# Patient Record
Sex: FEMALE | Race: OTHER | HISPANIC OR LATINO | Employment: FULL TIME | URBAN - METROPOLITAN AREA
[De-identification: names, ages, dates, MRNs, and addresses within clinical notes are randomized per-mention and may not be internally consistent; named-entity substitution may affect disease eponyms.]

---

## 2022-06-13 ENCOUNTER — HOSPITAL ENCOUNTER (OUTPATIENT)
Dept: RADIOLOGY | Facility: MEDICAL CENTER | Age: 42
End: 2022-06-13
Attending: FAMILY MEDICINE

## 2022-06-13 DIAGNOSIS — M79.18 GLUTEAL PAIN: ICD-10-CM

## 2022-11-01 ENCOUNTER — APPOINTMENT (OUTPATIENT)
Dept: RADIOLOGY | Facility: MEDICAL CENTER | Age: 42
End: 2022-11-01
Attending: EMERGENCY MEDICINE

## 2022-11-01 ENCOUNTER — HOSPITAL ENCOUNTER (OUTPATIENT)
Facility: MEDICAL CENTER | Age: 42
End: 2022-11-02
Attending: EMERGENCY MEDICINE | Admitting: INTERNAL MEDICINE
Payer: COMMERCIAL

## 2022-11-01 ENCOUNTER — APPOINTMENT (OUTPATIENT)
Dept: RADIOLOGY | Facility: MEDICAL CENTER | Age: 42
End: 2022-11-01
Attending: INTERNAL MEDICINE

## 2022-11-01 DIAGNOSIS — M79.89 SOFT TISSUE MASS: ICD-10-CM

## 2022-11-01 DIAGNOSIS — M79.18 GLUTEAL PAIN: ICD-10-CM

## 2022-11-01 PROBLEM — R22.41 MASS OF RIGHT THIGH: Status: ACTIVE | Noted: 2022-11-01

## 2022-11-01 LAB
ALBUMIN SERPL BCP-MCNC: 4.9 G/DL (ref 3.2–4.9)
ALBUMIN/GLOB SERPL: 1.5 G/DL
ALP SERPL-CCNC: 81 U/L (ref 30–99)
ALT SERPL-CCNC: 23 U/L (ref 2–50)
ANION GAP SERPL CALC-SCNC: 12 MMOL/L (ref 7–16)
AST SERPL-CCNC: 25 U/L (ref 12–45)
BASOPHILS # BLD AUTO: 0.8 % (ref 0–1.8)
BASOPHILS # BLD: 0.05 K/UL (ref 0–0.12)
BILIRUB SERPL-MCNC: 0.2 MG/DL (ref 0.1–1.5)
BUN SERPL-MCNC: 13 MG/DL (ref 8–22)
CALCIUM SERPL-MCNC: 9.2 MG/DL (ref 8.5–10.5)
CHLORIDE SERPL-SCNC: 110 MMOL/L (ref 96–112)
CO2 SERPL-SCNC: 21 MMOL/L (ref 20–33)
CREAT SERPL-MCNC: 0.58 MG/DL (ref 0.5–1.4)
EOSINOPHIL # BLD AUTO: 0.26 K/UL (ref 0–0.51)
EOSINOPHIL NFR BLD: 4.1 % (ref 0–6.9)
ERYTHROCYTE [DISTWIDTH] IN BLOOD BY AUTOMATED COUNT: 42.6 FL (ref 35.9–50)
GFR SERPLBLD CREATININE-BSD FMLA CKD-EPI: 115 ML/MIN/1.73 M 2
GLOBULIN SER CALC-MCNC: 3.2 G/DL (ref 1.9–3.5)
GLUCOSE SERPL-MCNC: 99 MG/DL (ref 65–99)
HCG SERPL QL: NEGATIVE
HCT VFR BLD AUTO: 43.8 % (ref 37–47)
HGB BLD-MCNC: 13.8 G/DL (ref 12–16)
IMM GRANULOCYTES # BLD AUTO: 0.03 K/UL (ref 0–0.11)
IMM GRANULOCYTES NFR BLD AUTO: 0.5 % (ref 0–0.9)
LACTATE SERPL-SCNC: 1.3 MMOL/L (ref 0.5–2)
LYMPHOCYTES # BLD AUTO: 1.9 K/UL (ref 1–4.8)
LYMPHOCYTES NFR BLD: 30 % (ref 22–41)
MCH RBC QN AUTO: 26.4 PG (ref 27–33)
MCHC RBC AUTO-ENTMCNC: 31.5 G/DL (ref 33.6–35)
MCV RBC AUTO: 83.7 FL (ref 81.4–97.8)
MONOCYTES # BLD AUTO: 0.41 K/UL (ref 0–0.85)
MONOCYTES NFR BLD AUTO: 6.5 % (ref 0–13.4)
NEUTROPHILS # BLD AUTO: 3.69 K/UL (ref 2–7.15)
NEUTROPHILS NFR BLD: 58.1 % (ref 44–72)
NRBC # BLD AUTO: 0 K/UL
NRBC BLD-RTO: 0 /100 WBC
PLATELET # BLD AUTO: 186 K/UL (ref 164–446)
PMV BLD AUTO: 10.1 FL (ref 9–12.9)
POTASSIUM SERPL-SCNC: 3.8 MMOL/L (ref 3.6–5.5)
PROT SERPL-MCNC: 8.1 G/DL (ref 6–8.2)
RBC # BLD AUTO: 5.23 M/UL (ref 4.2–5.4)
SODIUM SERPL-SCNC: 143 MMOL/L (ref 135–145)
WBC # BLD AUTO: 6.3 K/UL (ref 4.8–10.8)

## 2022-11-01 PROCEDURE — G0378 HOSPITAL OBSERVATION PER HR: HCPCS

## 2022-11-01 PROCEDURE — 700117 HCHG RX CONTRAST REV CODE 255: Performed by: INTERNAL MEDICINE

## 2022-11-01 PROCEDURE — 84703 CHORIONIC GONADOTROPIN ASSAY: CPT

## 2022-11-01 PROCEDURE — 72193 CT PELVIS W/DYE: CPT

## 2022-11-01 PROCEDURE — 80053 COMPREHEN METABOLIC PANEL: CPT

## 2022-11-01 PROCEDURE — 85025 COMPLETE CBC W/AUTO DIFF WBC: CPT

## 2022-11-01 PROCEDURE — A9270 NON-COVERED ITEM OR SERVICE: HCPCS | Performed by: INTERNAL MEDICINE

## 2022-11-01 PROCEDURE — 83605 ASSAY OF LACTIC ACID: CPT

## 2022-11-01 PROCEDURE — 73720 MRI LWR EXTREMITY W/O&W/DYE: CPT | Mod: RT

## 2022-11-01 PROCEDURE — A9576 INJ PROHANCE MULTIPACK: HCPCS | Performed by: INTERNAL MEDICINE

## 2022-11-01 PROCEDURE — 36415 COLL VENOUS BLD VENIPUNCTURE: CPT

## 2022-11-01 PROCEDURE — 87040 BLOOD CULTURE FOR BACTERIA: CPT

## 2022-11-01 PROCEDURE — 700102 HCHG RX REV CODE 250 W/ 637 OVERRIDE(OP): Performed by: INTERNAL MEDICINE

## 2022-11-01 PROCEDURE — 700117 HCHG RX CONTRAST REV CODE 255: Performed by: EMERGENCY MEDICINE

## 2022-11-01 PROCEDURE — 99285 EMERGENCY DEPT VISIT HI MDM: CPT

## 2022-11-01 PROCEDURE — 99220 PR INITIAL OBSERVATION CARE,LEVL III: CPT | Performed by: INTERNAL MEDICINE

## 2022-11-01 RX ORDER — POLYETHYLENE GLYCOL 3350 17 G/17G
1 POWDER, FOR SOLUTION ORAL
Status: DISCONTINUED | OUTPATIENT
Start: 2022-11-01 | End: 2022-11-02 | Stop reason: HOSPADM

## 2022-11-01 RX ORDER — BISACODYL 10 MG
10 SUPPOSITORY, RECTAL RECTAL
Status: DISCONTINUED | OUTPATIENT
Start: 2022-11-01 | End: 2022-11-02 | Stop reason: HOSPADM

## 2022-11-01 RX ORDER — HYDROMORPHONE HYDROCHLORIDE 1 MG/ML
0.5 INJECTION, SOLUTION INTRAMUSCULAR; INTRAVENOUS; SUBCUTANEOUS
Status: DISCONTINUED | OUTPATIENT
Start: 2022-11-01 | End: 2022-11-02 | Stop reason: HOSPADM

## 2022-11-01 RX ORDER — AMOXICILLIN 250 MG
2 CAPSULE ORAL 2 TIMES DAILY
Status: DISCONTINUED | OUTPATIENT
Start: 2022-11-01 | End: 2022-11-02 | Stop reason: HOSPADM

## 2022-11-01 RX ORDER — OXYCODONE HYDROCHLORIDE 5 MG/1
5 TABLET ORAL
Status: DISCONTINUED | OUTPATIENT
Start: 2022-11-01 | End: 2022-11-02 | Stop reason: HOSPADM

## 2022-11-01 RX ORDER — OXYCODONE HYDROCHLORIDE 10 MG/1
10 TABLET ORAL
Status: DISCONTINUED | OUTPATIENT
Start: 2022-11-01 | End: 2022-11-02 | Stop reason: HOSPADM

## 2022-11-01 RX ORDER — ENOXAPARIN SODIUM 100 MG/ML
40 INJECTION SUBCUTANEOUS DAILY
Status: DISCONTINUED | OUTPATIENT
Start: 2022-11-02 | End: 2022-11-02 | Stop reason: HOSPADM

## 2022-11-01 RX ORDER — ACETAMINOPHEN 325 MG/1
650 TABLET ORAL EVERY 6 HOURS PRN
Status: DISCONTINUED | OUTPATIENT
Start: 2022-11-01 | End: 2022-11-02 | Stop reason: HOSPADM

## 2022-11-01 RX ORDER — ETONOGESTREL 68 MG/1
1 IMPLANT SUBCUTANEOUS ONCE
COMMUNITY

## 2022-11-01 RX ADMIN — GADOTERIDOL 14 ML: 279.3 INJECTION, SOLUTION INTRAVENOUS at 17:52

## 2022-11-01 RX ADMIN — SENNOSIDES AND DOCUSATE SODIUM 2 TABLET: 50; 8.6 TABLET ORAL at 18:22

## 2022-11-01 RX ADMIN — IOHEXOL 85 ML: 350 INJECTION, SOLUTION INTRAVENOUS at 13:32

## 2022-11-01 RX ADMIN — OXYCODONE 5 MG: 5 TABLET ORAL at 18:22

## 2022-11-01 ASSESSMENT — ENCOUNTER SYMPTOMS
NAUSEA: 0
COUGH: 0
DIZZINESS: 0
SEIZURES: 0
PALPITATIONS: 0
LOSS OF CONSCIOUSNESS: 0
INSOMNIA: 0
MYALGIAS: 1
DIARRHEA: 0
EYE REDNESS: 0
WEAKNESS: 0
CONSTIPATION: 0
HEADACHES: 0
SHORTNESS OF BREATH: 0
ABDOMINAL PAIN: 0
FEVER: 0
HEMOPTYSIS: 0
EYE PAIN: 0
CHILLS: 0
WHEEZING: 0
FOCAL WEAKNESS: 0
FALLS: 0
BLOOD IN STOOL: 0
TREMORS: 0
VOMITING: 0
NERVOUS/ANXIOUS: 0

## 2022-11-01 ASSESSMENT — LIFESTYLE VARIABLES
TOTAL SCORE: 0
ON A TYPICAL DAY WHEN YOU DRINK ALCOHOL HOW MANY DRINKS DO YOU HAVE: 0
ALCOHOL_USE: NO
CONSUMPTION TOTAL: NEGATIVE
TOTAL SCORE: 0
HAVE PEOPLE ANNOYED YOU BY CRITICIZING YOUR DRINKING: NO
HOW MANY TIMES IN THE PAST YEAR HAVE YOU HAD 5 OR MORE DRINKS IN A DAY: 0
EVER FELT BAD OR GUILTY ABOUT YOUR DRINKING: NO
TOTAL SCORE: 0
AVERAGE NUMBER OF DAYS PER WEEK YOU HAVE A DRINK CONTAINING ALCOHOL: 0
EVER HAD A DRINK FIRST THING IN THE MORNING TO STEADY YOUR NERVES TO GET RID OF A HANGOVER: NO
HAVE YOU EVER FELT YOU SHOULD CUT DOWN ON YOUR DRINKING: NO

## 2022-11-01 ASSESSMENT — FIBROSIS 4 INDEX: FIB4 SCORE: 1.21

## 2022-11-01 ASSESSMENT — PAIN SCALES - WONG BAKER: WONGBAKER_NUMERICALRESPONSE: DOESN'T HURT AT ALL

## 2022-11-01 ASSESSMENT — PATIENT HEALTH QUESTIONNAIRE - PHQ9
1. LITTLE INTEREST OR PLEASURE IN DOING THINGS: NOT AT ALL
SUM OF ALL RESPONSES TO PHQ9 QUESTIONS 1 AND 2: 0
2. FEELING DOWN, DEPRESSED, IRRITABLE, OR HOPELESS: NOT AT ALL

## 2022-11-01 NOTE — ED PROVIDER NOTES
ED Provider Note    CHIEF COMPLAINT  Chief Complaint   Patient presents with    Abscess     Pt reports small swelling on Right Buttocks beginning last .  Seen at [facility] in  with work up and US preformed.  Pt has US results with herm pt reports no treatment offered.  Pt now has large swelling noted on right buttocks and increased pain.         HPI  Madison Allen is a 42 y.o. female who presents with swelling to her right buttock since .  She was evaluated as an outpatient and had an ultrasound done.  She was told that it might be an infection.  Has not had any procedures for this or antibiotics.  No fevers.  No redness.  She was brought in here due to increasing in size of the swelling.  Patient denies any fevers.  No redness or skin changes.  Has worsening swelling and pain there however to the right buttock.    REVIEW OF SYSTEMS  See HPI for further details. All other systems are negative.     PAST MEDICAL HISTORY   has a past medical history of Pregnant state, incidental.    SOCIAL HISTORY  Social History     Tobacco Use    Smoking status: Former     Years: 0.00     Types: Cigarettes     Quit date: 1994     Years since quittin.1    Smokeless tobacco: Never    Tobacco comments:     last used 9 years ago   Vaping Use    Vaping Use: Never used   Substance and Sexual Activity    Alcohol use: No     Comment: socially    Drug use: No    Sexual activity: Yes     Partners: Male     Birth control/protection: Condom       SURGICAL HISTORY   has a past surgical history that includes cholecystectomy (Dx ).    CURRENT MEDICATIONS  Home Medications       Reviewed by Rosalia Quijano R.N. (Registered Nurse) on 22 at 1106  Med List Status: Partial     Medication Last Dose Status   cephALEXin (KEFLEX) 500 MG CAPS  Active   norethindrone (MICRONOR) 0.35 MG tablet  Active                    ALLERGIES  Allergies   Allergen Reactions    Nkda [No Known Drug Allergy]        PHYSICAL EXAM  VITAL  "SIGNS: /80   Pulse 81   Temp 36.3 °C (97.4 °F) (Temporal)   Resp 12   Ht 1.524 m (5')   Wt 67.2 kg (148 lb 2.4 oz)   SpO2 99%   BMI 28.93 kg/m²   Pulse ox interpretation: I interpret this pulse ox as normal.  Constitutional: Alert in no apparent distress.  HENT: No signs of trauma, Bilateral external ears normal, Nose normal.   Eyes: Conjunctiva normal, Non-icteric.   Neck: Normal range of motion, Supple, No stridor.   Cardiovascular: Regular rate and rhythm.   Thorax & Lungs: Normal breath sounds, No respiratory distress, No wheezing, No chest tenderness.   Abdomen: Bowel sounds normal, Soft, No tenderness, No masses, No pulsatile masses. No peritoneal signs.  Skin: Warm, Dry, No erythema, No rash.  Palpable mass deep to the skin overlying the right buttock.  No fluctuance.  The mass does feel firm to touch though and feels well beneath the dermis.  No erythema or overlying skin changes.  Back: No bony tenderness, No CVA tenderness.   Musculoskeletal: Good range of motion in all major joints. No major deformities noted.   Neurologic: Alert, No focal deficits noted.       DIAGNOSTIC STUDIES / PROCEDURES      LABS  Labs Reviewed   CBC WITH DIFFERENTIAL - Abnormal; Notable for the following components:       Result Value    MCH 26.4 (*)     MCHC 31.5 (*)     All other components within normal limits   COMP METABOLIC PANEL   HCG QUAL SERUM   LACTIC ACID   ESTIMATED GFR   BLOOD CULTURE    Narrative:     Per Hospital Policy: Only change Specimen Src: to \"Line\" if  specified by physician order.   BLOOD CULTURE    Narrative:     Per Hospital Policy: Only change Specimen Src: to \"Line\" if  specified by physician order.         RADIOLOGY  CT-PELVIS WITH   Final Result      Right gluteus denise 15 cm soft tissue mass with mostly fluid and fatty components is highly worrisome for soft tissue sarcoma, liposarcoma. Myxoma should be considered especially given some fat along the margins. MRI thigh with and without " contrast may    prove helpful to further evaluate the soft tissue. The mass is stable to slightly enlarged from June            COURSE & MEDICAL DECISION MAKING    Medications   iohexol (OMNIPAQUE) 350 mg/mL (IV) (85 mL Intravenous Given 11/1/22 1332)       Pertinent Labs & Imaging studies reviewed. (See chart for details)  42 y.o. female's to the right buttock that has been noticed since about June.  Had an outpatient ultrasound performed though no procedure or medications administered.  She was brought in by a friend due to worsening pain symptoms and swelling.  It is affecting her gait now as well.    CT was performed for further characterization of the wound.  Clinically, it does not appear to be an abscess as the swelling appears very deep to the skin within the muscle.  No skin changes, erythema, fluctuance, warmth.  No signs of sepsis.  No fever or tachycardia.    CT pelvis was performed showing a 15 cm soft tissue mass worrisome for soft tissue sarcoma/liposarcoma.  Radiology is recommending MRI thigh with and without contrast for further characterization.    Spoke with oncology, Dr. Garcia.  Recommending hospitalization to expedite work-up.  Will follow-up on MRI results with further recommendations.  Spoke with the hospitalist who is agreeable to the hospitalization.    Patient was informed of the results and my concern for soft tissue cancer.  Sister was present at bedside.  Questions were answered to the best of my ability at this time however will require further testing to determine prognosis and a clear plan of care from here.    /61   Pulse 79   Temp 36.3 °C (97.4 °F) (Temporal)   Resp 16   Ht 1.524 m (5')   Wt 67.2 kg (148 lb 2.4 oz)   SpO2 99%   BMI 28.93 kg/m²       FINAL IMPRESSION  1. Soft tissue mass            Electronically signed by: Dennys Dinero M.D., 11/1/2022 11:42 AM

## 2022-11-01 NOTE — ASSESSMENT & PLAN NOTE
MRI ordered.  Dr. Garcia, Oncology consulted.  Admit to observation and pain control.  PT ordered  NPO asfter midnight in case Dr. Garcia decides to proceed with biopsy

## 2022-11-01 NOTE — H&P
"Hospital Medicine History & Physical Note    Date of Service  11/1/2022    Primary Care Physician  Pcp Pt States None    Consultants  Dr. Garcia, Oncology    Code Status  Prior    Chief Complaint  Chief Complaint   Patient presents with    Abscess     Pt reports small swelling on Right Buttocks beginning last June.  Seen at [facility] in June with work up and US preformed.  Pt has US results with herm pt reports no treatment offered.  Pt now has large swelling noted on right buttocks and increased pain.         History of Presenting Illness  Madison Allen is a 42 y.o. female who presented 11/1/2022 with \"Abscess (Pt reports small swelling on Right Buttocks beginning last June.  Seen at [facility] in June with work up and US preformed.  Pt has US results with herm pt reports no treatment offered.  Pt now has large swelling noted on right buttocks and increased pain. \" )  She presents with R buttock swelling. She denied weight loss, sweats, fever, chills, discharge or, bites or wounds. She had swelling in June and had an ultrasound showing possible abscess/phlegmon but was lost to follow up. The mass remains but has grown becoming more painful and she had difficulty walking thus her family urged her to come to the hospital. She denies smoking. She has no history of cancer. She is not exposed to chemicals. Her father has lung cancer secondary to chemical exposure.  At the ED, she is afebrile and normotensive.  CT Ab/P  Right gluteus denise 15 cm soft tissue mass with mostly fluid and fatty components is highly worrisome for soft tissue sarcoma, liposarcoma. Myxoma should be considered especially given some fat along the margins. MRI thigh with and without contrast may prove helpful to further evaluate the soft tissue. The mass is stable to slightly enlarged from June  No leukocytosis.  Dr. Dinero, EDP called Dr. Garcia who recommended admission for MRI, results of which will determine further work-up. Spoke with " Case Management who recommended admission to observation.  When I saw her at ED, no acute distress. There is no wound or ulcer but there is a mildly tender palpable mass R buttocks.  I called and spoke with Dr. Garcia who recommended admission for MRI and will decide if biopsy needed. He prefers biopsy to be done in the hospital and will authorize it as she is easily lost to follow up if done outpatient.     I discussed the plan of care with patient, family, bedside RN, and oncology.    Review of Systems  Review of Systems   Constitutional:  Negative for chills and fever.   HENT:  Negative for congestion, hearing loss and nosebleeds.    Eyes:  Negative for pain and redness.   Respiratory:  Negative for cough, hemoptysis, shortness of breath and wheezing.    Cardiovascular:  Negative for chest pain and palpitations.   Gastrointestinal:  Negative for abdominal pain, blood in stool, constipation, diarrhea, nausea and vomiting.   Genitourinary:  Negative for dysuria, frequency and hematuria.   Musculoskeletal:  Positive for myalgias. Negative for falls and joint pain.   Skin:  Negative for rash.   Neurological:  Negative for dizziness, tremors, focal weakness, seizures, loss of consciousness, weakness and headaches.   Psychiatric/Behavioral:  The patient is not nervous/anxious and does not have insomnia.    All other systems reviewed and are negative.    Past Medical History   has a past medical history of Pregnant state, incidental.    Surgical History   has a past surgical history that includes cholecystectomy (Dx 2006).     Family History  family history includes Alcohol/Drug in her father; Diabetes in her brother and mother; Heart Disease (age of onset: 62) in her mother; Hypertension in her mother; Thyroid in her sister.   Family history reviewed with patient. There is no family history that is pertinent to the chief complaint.     Social History   reports that she quit smoking about 28 years ago. Her smoking use  included cigarettes. She has never used smokeless tobacco. She reports that she does not drink alcohol and does not use drugs.    Allergies  Allergies   Allergen Reactions    Nkda [No Known Drug Allergy]        Medications  Prior to Admission Medications   Prescriptions Last Dose Informant Patient Reported? Taking?   cephALEXin (KEFLEX) 500 MG CAPS   No No   Sig: Take 1 Cap by mouth 4 times a day.   norethindrone (MICRONOR) 0.35 MG tablet   No No   Sig: Take 1 Tab by mouth every day.      Facility-Administered Medications: None       Physical Exam  Temp:  [36.3 °C (97.4 °F)] 36.3 °C (97.4 °F)  Pulse:  [79-81] 79  Resp:  [12-16] 16  BP: (106-121)/(61-80) 106/61  SpO2:  [99 %] 99 %  Blood Pressure: 106/61   Temperature: 36.3 °C (97.4 °F)   Pulse: 79   Respiration: 16   Pulse Oximetry: 99 %       Physical Exam  Vitals and nursing note reviewed.   Constitutional:       General: She is not in acute distress.  HENT:      Head: Normocephalic and atraumatic.      Right Ear: External ear normal.      Left Ear: External ear normal.      Nose: Nose normal.      Mouth/Throat:      Mouth: Mucous membranes are moist.   Eyes:      General: No scleral icterus.     Conjunctiva/sclera: Conjunctivae normal.   Cardiovascular:      Rate and Rhythm: Normal rate and regular rhythm.      Pulses: Normal pulses.      Heart sounds: No murmur heard.    No friction rub. No gallop.   Pulmonary:      Effort: Pulmonary effort is normal. No respiratory distress.      Breath sounds: Normal breath sounds. No stridor. No wheezing, rhonchi or rales.   Chest:      Chest wall: No tenderness.   Abdominal:      General: Abdomen is flat. Bowel sounds are normal. There is no distension.      Palpations: Abdomen is soft.      Tenderness: There is no abdominal tenderness. There is no guarding or rebound.   Musculoskeletal:         General: Swelling, tenderness and deformity present. Normal range of motion.      Cervical back: Normal range of motion and neck  supple. No rigidity.      Comments: R buttock mass   Skin:     General: Skin is warm.      Coloration: Skin is not jaundiced.   Neurological:      General: No focal deficit present.      Mental Status: She is alert and oriented to person, place, and time. Mental status is at baseline.   Psychiatric:         Mood and Affect: Mood normal.         Behavior: Behavior normal.         Thought Content: Thought content normal.         Judgment: Judgment normal.       Laboratory:  Recent Labs     11/01/22  1229   WBC 6.3   RBC 5.23   HEMOGLOBIN 13.8   HEMATOCRIT 43.8   MCV 83.7   MCH 26.4*   MCHC 31.5*   RDW 42.6   PLATELETCT 186   MPV 10.1     Recent Labs     11/01/22  1229   SODIUM 143   POTASSIUM 3.8   CHLORIDE 110   CO2 21   GLUCOSE 99   BUN 13   CREATININE 0.58   CALCIUM 9.2     Recent Labs     11/01/22  1229   ALTSGPT 23   ASTSGOT 25   ALKPHOSPHAT 81   TBILIRUBIN 0.2   GLUCOSE 99         No results for input(s): NTPROBNP in the last 72 hours.      No results for input(s): TROPONINT in the last 72 hours.    Imaging:  CT-PELVIS WITH   Final Result      Right gluteus denise 15 cm soft tissue mass with mostly fluid and fatty components is highly worrisome for soft tissue sarcoma, liposarcoma. Myxoma should be considered especially given some fat along the margins. MRI thigh with and without contrast may    prove helpful to further evaluate the soft tissue. The mass is stable to slightly enlarged from June      MR-FEMUR-WITH & W/O RIGHT    (Results Pending)       Assessment/Plan:  Justification for Admission Status  I anticipate this patient is appropriate for observation status at this time because w/u of mass    * R gluteal pain and mass/swellinh  Assessment & Plan  MRI ordered.  Dr. Garcia, Oncology consulted.  Admit to observation and pain control.  PT ordered  NPO asfter midnight in case Dr. Garcia decides to proceed with biopsy      VTE prophylaxis: enoxaparin ppx hold prior to procedure

## 2022-11-01 NOTE — ED TRIAGE NOTES
Chief Complaint   Patient presents with    Abscess     Pt reports small swelling on Right Buttocks beginning last June.  Seen at [facility] in June with work up and US preformed.  Pt has US results with herm pt reports no treatment offered.  Pt now has large swelling noted on right buttocks and increased pain.       Pt to triage from [facility] with above complaint and daughter as .  Pt declined interrupter.      /80   Pulse 81   Temp 36.3 °C (97.4 °F) (Temporal)   Resp 12   Ht 1.524 m (5')   Wt 67.2 kg (148 lb 2.4 oz)   SpO2 99%   BMI 28.93 kg/m²

## 2022-11-02 ENCOUNTER — APPOINTMENT (OUTPATIENT)
Dept: RADIOLOGY | Facility: MEDICAL CENTER | Age: 42
End: 2022-11-02
Attending: STUDENT IN AN ORGANIZED HEALTH CARE EDUCATION/TRAINING PROGRAM
Payer: MEDICAID

## 2022-11-02 VITALS
DIASTOLIC BLOOD PRESSURE: 58 MMHG | TEMPERATURE: 98 F | OXYGEN SATURATION: 95 % | HEIGHT: 65 IN | HEART RATE: 78 BPM | RESPIRATION RATE: 16 BRPM | WEIGHT: 149.91 LBS | BODY MASS INDEX: 24.98 KG/M2 | SYSTOLIC BLOOD PRESSURE: 100 MMHG

## 2022-11-02 LAB
ANION GAP SERPL CALC-SCNC: 11 MMOL/L (ref 7–16)
BUN SERPL-MCNC: 17 MG/DL (ref 8–22)
CALCIUM SERPL-MCNC: 9.1 MG/DL (ref 8.5–10.5)
CHLORIDE SERPL-SCNC: 105 MMOL/L (ref 96–112)
CO2 SERPL-SCNC: 21 MMOL/L (ref 20–33)
CREAT SERPL-MCNC: 0.64 MG/DL (ref 0.5–1.4)
ERYTHROCYTE [DISTWIDTH] IN BLOOD BY AUTOMATED COUNT: 43 FL (ref 35.9–50)
GFR SERPLBLD CREATININE-BSD FMLA CKD-EPI: 112 ML/MIN/1.73 M 2
GLUCOSE SERPL-MCNC: 96 MG/DL (ref 65–99)
HCT VFR BLD AUTO: 39.3 % (ref 37–47)
HGB BLD-MCNC: 12.5 G/DL (ref 12–16)
MCH RBC QN AUTO: 26.7 PG (ref 27–33)
MCHC RBC AUTO-ENTMCNC: 31.8 G/DL (ref 33.6–35)
MCV RBC AUTO: 84 FL (ref 81.4–97.8)
PLATELET # BLD AUTO: 191 K/UL (ref 164–446)
PMV BLD AUTO: 11.2 FL (ref 9–12.9)
POTASSIUM SERPL-SCNC: 3.9 MMOL/L (ref 3.6–5.5)
RBC # BLD AUTO: 4.68 M/UL (ref 4.2–5.4)
SODIUM SERPL-SCNC: 137 MMOL/L (ref 135–145)
WBC # BLD AUTO: 7.7 K/UL (ref 4.8–10.8)

## 2022-11-02 PROCEDURE — 80048 BASIC METABOLIC PNL TOTAL CA: CPT

## 2022-11-02 PROCEDURE — 74176 CT ABD & PELVIS W/O CONTRAST: CPT

## 2022-11-02 PROCEDURE — A9270 NON-COVERED ITEM OR SERVICE: HCPCS | Performed by: INTERNAL MEDICINE

## 2022-11-02 PROCEDURE — 85027 COMPLETE CBC AUTOMATED: CPT

## 2022-11-02 PROCEDURE — 700102 HCHG RX REV CODE 250 W/ 637 OVERRIDE(OP): Performed by: INTERNAL MEDICINE

## 2022-11-02 PROCEDURE — 97161 PT EVAL LOW COMPLEX 20 MIN: CPT

## 2022-11-02 PROCEDURE — G0378 HOSPITAL OBSERVATION PER HR: HCPCS

## 2022-11-02 PROCEDURE — 99217 PR OBSERVATION CARE DISCHARGE: CPT | Performed by: STUDENT IN AN ORGANIZED HEALTH CARE EDUCATION/TRAINING PROGRAM

## 2022-11-02 RX ADMIN — SENNOSIDES AND DOCUSATE SODIUM 2 TABLET: 50; 8.6 TABLET ORAL at 17:03

## 2022-11-02 ASSESSMENT — GAIT ASSESSMENTS
GAIT LEVEL OF ASSIST: SUPERVISED
DISTANCE (FEET): 300
DEVIATION: ANTALGIC

## 2022-11-02 ASSESSMENT — COGNITIVE AND FUNCTIONAL STATUS - GENERAL
MOBILITY SCORE: 24
SUGGESTED CMS G CODE MODIFIER MOBILITY: CH

## 2022-11-02 NOTE — DISCHARGE SUMMARY
"Discharge Summary    CHIEF COMPLAINT ON ADMISSION  Chief Complaint   Patient presents with    Abscess     Pt reports small swelling on Right Buttocks beginning last June.  Seen at [facility] in June with work up and US preformed.  Pt has US results with herm pt reports no treatment offered.  Pt now has large swelling noted on right buttocks and increased pain.         Reason for Admission  Right gluteus mass    Admission Date  11/1/2022    CODE STATUS  Full Code    HPI & HOSPITAL COURSE  Per Dr. Harmon's H&P dated 11/1/2022:  \"Madison Allen is a 42 y.o. female who presented 11/1/2022 with \"Abscess (Pt reports small swelling on Right Buttocks beginning last June.  Seen at [facility] in June with work up and US preformed.  Pt has US results with herm pt reports no treatment offered.  Pt now has large swelling noted on right buttocks and increased pain. \" )  She presents with R buttock swelling. She denied weight loss, sweats, fever, chills, discharge or, bites or wounds. She had swelling in June and had an ultrasound showing possible abscess/phlegmon but was lost to follow up. The mass remains but has grown becoming more painful and she had difficulty walking thus her family urged her to come to the hospital. She denies smoking. She has no history of cancer. She is not exposed to chemicals. Her father has lung cancer secondary to chemical exposure.  At the ED, she is afebrile and normotensive.  CT Ab/P  Right gluteus denise 15 cm soft tissue mass with mostly fluid and fatty components is highly worrisome for soft tissue sarcoma, liposarcoma. Myxoma should be considered especially given some fat along the margins. MRI thigh with and without contrast may prove helpful to further evaluate the soft tissue. The mass is stable to slightly enlarged from June  No leukocytosis.  Dr. Dinero, EDP called Dr. Garcia who recommended admission for MRI, results of which will determine further work-up. Spoke with Case " "Management who recommended admission to observation.  When I saw her at ED, no acute distress. There is no wound or ulcer but there is a mildly tender palpable mass R buttocks.  I called and spoke with Dr. Garcia who recommended admission for MRI and will decide if biopsy needed. He prefers biopsy to be done in the hospital and will authorize it as she is easily lost to follow up if done outpatient.\"    Hospital course during my care:  Afebrile and stable vitals. Exam at bedside was notable for mild swelling and deformity of right gluteus area with mass like sensation on palpation in right lower quarter without skin changes, redness or induration (Chaperone RN Doreen presented for this exam).     MRI done showed large right inferior buttock/upper posterior thigh mass which involves the right gluteus denise muscle as well as contacts the hamstrings muscles. This lesion measures 16 x 10 x 7.3 cm in size and demonstrates some fatty elements with heterogeneous enhancement.     Findings discussed with Oncology on call Dr. Garcia - recommended a biopsy of this mass (likely a sarcoma). Discussed care plan with Medical director on call Dr. Jewell - recommended following [facility] protocol with a referral to Oncology  who can follow up the patient as outpatient for biopsy and oncology appointment if indicated.      Pan-CT completed before discharged.     Therefore, she is discharged in fair and stable condition to home with close outpatient follow-up.    The patient recovered much more quickly than anticipated on admission.    Discharge Date  11/02/22    FOLLOW UP ITEMS POST DISCHARGE  N/A    DISCHARGE DIAGNOSES  Principal Problem:    R gluteal pain and mass/swellinh POA: Yes  Resolved Problems:    * No resolved hospital problems. *      FOLLOW UP  Future Appointments   Date Time Provider Department Center   11/2/2022  3:30 PM [facility] CT 3 PEDS [facility], Emergency " Dept  [address] [place] [id number]  [phone]  Go in 1 week(s)  As needed, If symptoms worsen      MEDICATIONS ON DISCHARGE     Medication List        CONTINUE taking these medications        Instructions   Nexplanon 68 MG Impl implant  Generic drug: etonogestrel   Inject 1 Each under the skin one time.  Dose: 1 Each              Allergies  Allergies   Allergen Reactions    Nkda [No Known Drug Allergy]        DIET  Orders Placed This Encounter   Procedures    Diet Order Diet: Regular     Standing Status:   Standing     Number of Occurrences:   1     Order Specific Question:   Diet:     Answer:   Regular [1]       ACTIVITY  As tolerated.  Weight bearing as tolerated    CONSULTATIONS  Oncology    PROCEDURES  N/A    LABORATORY  Lab Results   Component Value Date    SODIUM 137 11/02/2022    POTASSIUM 3.9 11/02/2022    CHLORIDE 105 11/02/2022    CO2 21 11/02/2022    GLUCOSE 96 11/02/2022    BUN 17 11/02/2022    CREATININE 0.64 11/02/2022    CREATININE 0.8 09/06/2007        Lab Results   Component Value Date    WBC 7.7 11/02/2022    HEMOGLOBIN 12.5 11/02/2022    HEMATOCRIT 39.3 11/02/2022    PLATELETCT 191 11/02/2022        Total time of the discharge process exceeds 36 minutes.

## 2022-11-02 NOTE — CARE PLAN
Assumed care of patient at shift change.  Patient AAO x 4, pleasant, on RA, no c/o pain nor discomfort.  Patient NPO at this time for possible procedure.  No needs verbalized at this time.  Call bell and belongings within reach.  Will continue to monitor.          The patient is Stable - Low risk of patient condition declining or worsening    Shift Goals  Clinical Goals: MRI results, pain control  Patient Goals: Comfort  Family Goals: N/A    Progress made toward(s) clinical / shift goals:    Problem: Knowledge Deficit - Standard  Goal: Patient and family/care givers will demonstrate understanding of plan of care, disease process/condition, diagnostic tests and medications  Outcome: Progressing     Problem: Pain - Standard  Goal: Alleviation of pain or a reduction in pain to the patient’s comfort goal  Outcome: Progressing       Patient is not progressing towards the following goals:

## 2022-11-02 NOTE — CARE PLAN
Assumed care of patient from ED.  Patient DELMIO x 4, pleasant, on RA, c/o 6/10 pain in Right buttocks...PRN pain medication given per MAR.  Box meal provided.  No needs verbalized at this time.  Call bell and belongings within reach. Sister at bedside.  Will continue to monitor.          4 Eyes Skin Assessment Completed by MICHEAL Logan and XIMENA Thomas-EMT.    Head WDL  Ears WDL  Nose WDL  Mouth WDL  Neck WDL  Breast/Chest WDL  Shoulder Blades WDL  Spine WDL  (R) Arm/Elbow/Hand WDL  (L) Arm/Elbow/Hand WDL  Abdomen WDL  Groin WDL  Scrotum/Coccyx/Buttocks WDL  (R) Leg WDL  (L) Leg WDL  (R) Heel/Foot/Toe WDL  (L) Heel/Foot/Toe WDL      Devices In Places Pulse Ox      Interventions In Place Pillows    Possible Skin Injury No    Pictures Uploaded Into Epic N/A  Wound Consult Placed N/A  RN Wound Prevention Protocol Ordered No              The patient is Stable - Low risk of patient condition declining or worsening    Shift Goals  Clinical Goals: MRI results, onco consult, pain control  Patient Goals: Comfort  Family Goals: Comfort    Progress made toward(s) clinical / shift goals:    Problem: Knowledge Deficit - Standard  Goal: Patient and family/care givers will demonstrate understanding of plan of care, disease process/condition, diagnostic tests and medications  Outcome: Progressing     Problem: Pain - Standard  Goal: Alleviation of pain or a reduction in pain to the patient’s comfort goal  Outcome: Progressing       Patient is not progressing towards the following goals:

## 2022-11-02 NOTE — ED NOTES
Report to CDU RN.     Pt will transport from MRI to CDU.    Family brought to CDU T203.    Pt to be NPO at midnight for possible biopsy tomorrow per Dr. Harmon.

## 2022-11-02 NOTE — CARE PLAN
The patient is Stable - Low risk of patient condition declining or worsening    Shift Goals  Clinical Goals: onco consult and pain control  Patient Goals: rest and pain control  Family Goals: Comfort    Progress made toward(s) clinical / shift goals:  pain managed throughout shift patient resting comfortably    Patient is not progressing towards the following goals: n/a        Problem: Knowledge Deficit - Standard  Goal: Patient and family/care givers will demonstrate understanding of plan of care, disease process/condition, diagnostic tests and medications  Outcome: Progressing     Problem: Pain - Standard  Goal: Alleviation of pain or a reduction in pain to the patient’s comfort goal  Outcome: Progressing

## 2022-11-02 NOTE — THERAPY
Physical Therapy   Initial Evaluation     Patient Name: Madison Allen  Age:  42 y.o., Sex:  female  Medical Record #: [id number]  Today's Date: 11/2/2022     Assessment  Patient is 42 y.o. female admitted with right gluteal swelling pending possible biopsy. Pt reports she has had swelling ongoing since June. Pt able to complete all bed mob, transfers, and gait with no AD and SPV. Antalgic gait noted with pt reporting mild R gluteal pain but tolerable. Patient will not be actively followed for physical therapy services at this time, however may be seen if requested by physician for 1 more visit within 30 days to address any discharge or equipment needs.    Plan    Recommend Physical Therapy for Evaluation only    DC Equipment Recommendations: None  Discharge Recommendations: Anticipate that the patient will have no further physical therapy needs after discharge from the hospital          11/02/22 0910   Prior Living Situation   Prior Services Home-Independent   Housing / Facility 1 Story House   Steps Into Home 0   Steps In Home 0   Equipment Owned None   Lives with - Patient's Self Care Capacity Spouse;Child Less than 18 Years of Age   Comments independent prior   Prior Level of Functional Mobility   Bed Mobility Independent   Transfer Status Independent   Ambulation Independent   Distance Ambulation (Feet)   (community)   Assistive Devices Used None   Stairs Independent   History of Falls   History of Falls No   Cognition    Cognition / Consciousness WDL   Active ROM Lower Body    Active ROM Lower Body  WDL   Strength Lower Body   Lower Body Strength  WDL   Sensation Lower Body   Lower Extremity Sensation   WDL   Balance Assessment   Sitting Balance (Static) Good   Sitting Balance (Dynamic) Good   Standing Balance (Static) Fair +   Standing Balance (Dynamic) Fair +   Weight Shift Sitting Good   Weight Shift Standing Good   Comments no AD   Gait Analysis   Gait Level Of Assist Supervised   Assistive Device None    Distance (Feet) 300   # of Times Distance was Traveled 1   Deviation Antalgic   Weight Bearing Status no restrictions   Comments no LOB   Bed Mobility    Supine to Sit Supervised   Sit to Supine Supervised   Scooting Supervised   Functional Mobility   Sit to Stand Supervised   Bed, Chair, Wheelchair Transfer Supervised   Transfer Method Stand Step   Mobility in room and hallway with no AD   Education Group   Education Provided Role of Physical Therapist   Role of Physical Therapist Patient Response Patient;Acceptance;Demonstration;Action Demonstration   Anticipated Discharge Equipment and Recommendations   DC Equipment Recommendations None   Discharge Recommendations Anticipate that the patient will have no further physical therapy needs after discharge from the hospital

## 2022-11-03 NOTE — PROGRESS NOTES
Patient discharged to home with friend.    AVS, discharge instructions, and education reviewed with patient. All questions answered.    IV removed.    AVS, discharge instructions, and belongings sent with patient.    Patient walked to friend's car. Refused staff escort.

## 2022-11-06 LAB
BACTERIA BLD CULT: NORMAL
BACTERIA BLD CULT: NORMAL
SIGNIFICANT IND 70042: NORMAL
SIGNIFICANT IND 70042: NORMAL
SITE SITE: NORMAL
SITE SITE: NORMAL
SOURCE SOURCE: NORMAL
SOURCE SOURCE: NORMAL

## 2022-11-09 ENCOUNTER — HOSPITAL ENCOUNTER (OUTPATIENT)
Dept: HEMATOLOGY ONCOLOGY | Facility: MEDICAL CENTER | Age: 42
End: 2022-11-09
Attending: NURSE PRACTITIONER
Payer: COMMERCIAL

## 2022-11-09 VITALS
SYSTOLIC BLOOD PRESSURE: 94 MMHG | HEIGHT: 65 IN | HEART RATE: 73 BPM | RESPIRATION RATE: 18 BRPM | TEMPERATURE: 97.6 F | WEIGHT: 146.94 LBS | BODY MASS INDEX: 24.48 KG/M2 | OXYGEN SATURATION: 97 % | DIASTOLIC BLOOD PRESSURE: 72 MMHG

## 2022-11-09 DIAGNOSIS — M79.18 GLUTEAL PAIN: ICD-10-CM

## 2022-11-09 DIAGNOSIS — R22.41 MASS OF RIGHT LOWER EXTREMITY: ICD-10-CM

## 2022-11-09 PROCEDURE — 99203 OFFICE O/P NEW LOW 30 MIN: CPT | Performed by: NURSE PRACTITIONER

## 2022-11-09 PROCEDURE — 99212 OFFICE O/P EST SF 10 MIN: CPT | Performed by: NURSE PRACTITIONER

## 2022-11-09 ASSESSMENT — ENCOUNTER SYMPTOMS
WHEEZING: 0
DIARRHEA: 0
CHILLS: 0
WEIGHT LOSS: 0
HEADACHES: 0
VOMITING: 0
TINGLING: 0
SHORTNESS OF BREATH: 0
DIAPHORESIS: 0
COUGH: 0
INSOMNIA: 1
DIZZINESS: 0
NAUSEA: 0
CONSTIPATION: 0
FEVER: 0
PALPITATIONS: 0

## 2022-11-09 ASSESSMENT — FIBROSIS 4 INDEX: FIB4 SCORE: 1.17

## 2022-11-09 ASSESSMENT — PAIN SCALES - GENERAL: PAINLEVEL: NO PAIN

## 2022-11-09 NOTE — PROGRESS NOTES
"Subjective     Madison Allen is a 42 y.o. female who presents as a New Patient (Gluteal pain )          HPI    Patient referred to me, Intake Oncology Coordinator by hospitalist Dr. Valerio for gluteal mass.  Patient is unaccompanied for today's visit.     Patient stated that in June 2022 she noticed what she described as \"little ball\" in the posterior right lower extremity.  She was seen at the [facility] clinic at that time and had an ultrasound completed on 6/13/2022.  In reviewing the ultrasound report there was a heterogeneous areas of increased echogenicity within the substance of the right gluteus denise muscle, with central areas of abscess formation and phlegmon.  At that time it measured 14 x 5.9 x 14.6 cm in diameter. According to the patient she was told everything was fine and that it was just filled with \"liquid.\"  Patient then noticed that this mass has gotten bigger and became painful to walk.  She was then seen again at the [facility] clinic and this time was told to go to the emergency department.  She presented to the ER on 11/1/2022.  CT pelvis completed at that time showed a right gluteus denise 15 cm soft tissue mass with mostly fluid and fatty components highly worrisome for soft tissue sarcoma, liposarcoma.  Myxoma is also on the differential.  She was admitted to the hospital and recommended further work-up including biopsy.  MRI on 11/1/2022 of the right femur noted large right inferior buttock/upper posterior thigh mass which involves the right gluteus denise muscle as well as contacts the hamstrings muscles.  This measures approximately 16 x 10 x 7.3 cm in size with the same differentials.  CT chest, abdomen and pelvis completed 11/2/2022 showed no evidence of metastatic disease.  There is calcification of the right lower lobe liver which is indeterminate but may represent remote granulomatous disease.  I did personally review all imaging reports discussed above as well as reviewed the MRI " images in detail.  I discussed the results in detail with the patient today.  Patient was admitted overnight in the hospital but clinically she was very stable and therefore was discharged home to continue with outpatient work-up.    Patient clinically states that she feels well.  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  Otherwise the only complaint she has is the pain with pressure or ambulating of the right posterior gluteal mass.  She defines the pain to be 7/10.    Please see past medical and surgical history below.    Patient does have a family history of cancer in her father and brother.  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  Both have passed away from this disease.    Patient is a never smoker.    Allergies   Allergen Reactions    Nkda [No Known Drug Allergy]      Current Outpatient Medications on File Prior to Encounter   Medication Sig Dispense Refill    etonogestrel (NEXPLANON) 68 MG Implant implant Inject 1 Each under the skin one time.       No current facility-administered medications on file prior to encounter.     Past Medical History:   Diagnosis Date    Pregnant state, incidental      Past Surgical History:   Procedure Laterality Date    CHOLECYSTECTOMY  Dx 2006    no complications     Family History   Problem Relation Age of Onset    Heart Disease Mother 62        hearth attack    Diabetes Mother         pills    Hypertension Mother     Cancer Father         Lung    Alcohol/Drug Father         alcoholic    Thyroid Sister     Cancer Brother         Lung    Diabetes Brother         diet     Social History     Socioeconomic History    Marital status: Single     Spouse name: Not on file    Number of children: Not on file    Years of education: Not on file    Highest education level: Not on file   Occupational History    Not on file   Tobacco Use    Smoking status: Never    Smokeless tobacco: Never    Tobacco comments:     last used 9 years  "ago   Vaping Use    Vaping Use: Never used   Substance and Sexual Activity    Alcohol use: No     Comment: socially    Drug use: No    Sexual activity: Yes     Partners: Male     Birth control/protection: Condom   Other Topics Concern    Not on file   Social History Narrative    Not on file     Social Determinants of Health     Financial Resource Strain: Not on file   Food Insecurity: Not on file   Transportation Needs: Not on file   Physical Activity: Not on file   Stress: Not on file   Social Connections: Not on file   Intimate Partner Violence: Not on file   Housing Stability: Not on file         Review of Systems   Constitutional:  Negative for chills, diaphoresis, fever, malaise/fatigue and weight loss.   Respiratory:  Negative for cough, shortness of breath and wheezing.    Cardiovascular:  Negative for chest pain, palpitations and leg swelling.   Gastrointestinal:  Negative for constipation, diarrhea, nausea and vomiting.   Genitourinary:  Negative for dysuria.   Musculoskeletal:         Right posterior extremity mass   Skin:  Negative for itching and rash.   Neurological:  Negative for dizziness, tingling and headaches.   Psychiatric/Behavioral:  The patient has insomnia (works night shift so difficulty at times sleeping).             Objective     BP (!) 94/72   Pulse 73   Temp 36.4 °C (97.6 °F) (Temporal)   Resp 18   Ht 1.651 m (5' 5\")   Wt 66.7 kg (146 lb 15 oz)   SpO2 97%   BMI 24.45 kg/m²      Physical Exam  Vitals reviewed.   Constitutional:       General: She is not in acute distress.     Appearance: Normal appearance. She is well-developed. She is not diaphoretic.   HENT:      Head: Normocephalic and atraumatic.      Mouth/Throat:      Mouth: Mucous membranes are moist.      Pharynx: Oropharynx is clear. No oropharyngeal exudate or posterior oropharyngeal erythema.   Eyes:      General: No scleral icterus.        Right eye: No discharge.         Left eye: No discharge.      Conjunctiva/sclera: " Conjunctivae normal.      Pupils: Pupils are equal, round, and reactive to light.   Neck:      Thyroid: No thyromegaly.   Cardiovascular:      Rate and Rhythm: Normal rate and regular rhythm.      Pulses: Normal pulses.      Heart sounds: Normal heart sounds. No murmur heard.    No friction rub. No gallop.   Pulmonary:      Effort: Pulmonary effort is normal. No respiratory distress.      Breath sounds: Normal breath sounds. No wheezing.   Abdominal:      General: Bowel sounds are normal. There is no distension.      Palpations: Abdomen is soft.      Tenderness: There is no abdominal tenderness.   Musculoskeletal:         General: Swelling (right posterior lower extremity) and tenderness present. Normal range of motion.      Cervical back: Normal range of motion and neck supple.      Comments: Large gluteal mass palpable   Lymphadenopathy:      Head:      Right side of head: No submental, submandibular, tonsillar, preauricular, posterior auricular or occipital adenopathy.      Left side of head: No submental, submandibular, tonsillar, preauricular, posterior auricular or occipital adenopathy.      Cervical: No cervical adenopathy.      Right cervical: No superficial, deep or posterior cervical adenopathy.     Left cervical: No superficial, deep or posterior cervical adenopathy.      Upper Body:      Right upper body: No supraclavicular adenopathy.      Left upper body: No supraclavicular adenopathy.   Skin:     General: Skin is warm and dry.      Coloration: Skin is not pale.      Findings: No erythema or rash.   Neurological:      Mental Status: She is alert and oriented to person, place, and time.   Psychiatric:         Mood and Affect: Mood normal.         Behavior: Behavior normal.          CT-CHEST,ABDOMEN,PELVIS W/O    Result Date: 11/2/2022 11/2/2022 4:18 PM HISTORY/REASON FOR EXAM:  Unknown primary, initial workup; Concern for a sarcoma and oncology is recommending a pan CT TECHNIQUE/EXAM DESCRIPTION: CT  scan of the chest, abdomen and pelvis without contrast was performed. Noncontrast helical scanning of the chest, abdomen and pelvis was obtained from the lung apices through the pubic symphysis. Low dose optimization technique was utilized for this CT exam including automated exposure control and adjustment of the mA and/or kV according to patient size. COMPARISON:  CT pelvis yesterday. FINDINGS: No osteoblastic or lytic metastasis CT Chest: Lungs: No nodules or airspace process. Mediastinum/Nathaly: No significant adenopathy. Pleura: No pleural effusion. Cardiac: Heart normal in size without pericardial effusion. Vascular: Unremarkable. Soft tissues: Unremarkable. Bones: No acute or destructive process. CT Abdomen and Pelvis: Limited by lack of contrast Liver: No noncontrast evidence of mass. There is a coarse right lobe calcification.. Spleen: Unremarkable. Small splenule. No splenomegaly Pancreas: Unremarkable. Gallbladder: The gallbladder has been resected. Biliary: Nondilated. Adrenal glands: Normal. Kidneys: Unremarkable without hydronephrosis. Bowel: No obstruction or acute inflammation. Lymph nodes: No adenopathy. Vasculature: Unremarkable. Peritoneum: Unremarkable without ascites. Musculoskeletal: No acute or destructive process. Pelvis: No adenopathy or free fluid. Normal size uterus and ovaries Normal configuration of the urinary bladder without abnormal distention Right gluteus denise centered mass is without change and characterized fully yesterday     No noncontrast evidence of metastasis Right gluteus denise centered mass is unchanged Cholecystectomy Calcification in the right lobe liver is indeterminate but may represent remote granulomatous disease    CT-PELVIS WITH    Result Date: 11/1/2022 11/1/2022 1:23 PM HISTORY/REASON FOR EXAM:  Soft tissue mass, pelvis, US/xray nondiagnostic; R buttock mass growing in size since June 2022. TECHNIQUE/EXAM DESCRIPTION AND NUMBER OF VIEWS: CT scan of the pelvis  with contrast. Contrast-enhanced helical scanning of the pelvis was obtained from the iliac crests through the pubic symphysis following the bolus administration of 85 mL of Omnipaque 350 nonionic contrast without complication. Low dose optimization technique was utilized for this CT exam including automated exposure control and adjustment of the mA and/or kV according to patient size. COMPARISON:  6/13/2022 ultrasound. FINDINGS: There is a fairly well-circumscribed ovoid mass in the right gluteus denise muscle measuring 15 x 9 x 6 cm. On ultrasound this measured approximately 14 x 6 cm. This is characterized by areas of mostly fluid density but some fatty components are seen as  well. No extension beyond the confines of the muscle is seen. Some fat is seen peripheral to the margins. No pelvic lymphadenopathy is identified The uterus is heterogeneous without enlargement confirmed. There is no cystic adnexal mass. Urinary bladder is partially decompressed Visualized bowel is normal in appearance Bony structures are normal without osteolytic or blastic change.     Right gluteus denise 15 cm soft tissue mass with mostly fluid and fatty components is highly worrisome for soft tissue sarcoma, liposarcoma. Myxoma should be considered especially given some fat along the margins. MRI thigh with and without contrast may  prove helpful to further evaluate the soft tissue. The mass is stable to slightly enlarged from June    MR-FEMUR-WITH & W/O RIGHT    Result Date: 11/2/2022 11/1/2022 5:41 PM HISTORY/REASON FOR EXAM: Right buttock mass. TECHNIQUE/EXAM DESCRIPTION: MRI of the RIGHT femur without and with contrast. The study was performed on a 3VRa 1.5 Devika MRI scanner. T1 sagittal, T1 coronal, T1 axial, T2 fat-suppressed axial and coronal, and fast spin-echo inversion recovery sagittal images were obtained of the femur pre-contrast followed by T1 axial fat suppressed images post intravenous administration of 14 mL  ProHance. COMPARISON: CT scan 11/1/2022 FINDINGS: Osseous structures/Cartilaginous surfaces: There is no evidence of marrow edema or fracture. No evidence of a bone lesion. There is no evidence of abnormal enhancement seen postcontrast administration. Soft tissue structures: There is a large soft tissue mass involving the right buttock inferiorly which extends into the right upper posterior thigh. This appears to involve the gluteus medius denise muscle as well as contacts the hamstrings muscles. This mass measures 16 x 10 x 7.3 cm in size and is characterized by heterogeneous increased T2 signal with mixed intermediate and increased T1 signal with heterogeneous enhancement. No evidence of inguinal adenopathy.     Large right inferior buttock/upper posterior thigh mass which involves the right gluteus denise muscle as well as contacts the hamstrings muscles. This lesion measures 16 x 10 x 7.3 cm in size and demonstrates some fatty elements with heterogeneous enhancement. Less likely process would be that of a liposarcoma to include myxoid variant liposarcoma.        [facility] ULTRASOUND  06/13/22  FINDINGS:  Within the right buttock soft tissues, there is a heterogeneous area of increased echogenicity measuring 14 x 5.9 x 14.6 cm in diameter. Within the deepest portions of the area of increased echogenicity, there is an ovoid anechoic lesion measuring 2.4 x   1.3 x 1.5 cm in diameter. More inferiorly, there is a graphic area of decreased echogenicity with posterior shadowing measuring 4.1 x 2.6 x 3.8 cm in diameter, likely phlegmon. The areas of decreased echogenicity with surrounding increased echogenicity   reside within the gluteus denise muscle and are compatible with focal areas of abscess and phlegmon.     IMPRESSION:     Heterogeneous areas of increased echogenicity within the substance of the right gluteus denise muscle, with central areas of abscess formation and phlegmon, detailed above.          Assessment & Plan       1. Mass of right lower extremity  IR-BIOPSY-GENERIC      2. Gluteal pain  IR-BIOPSY-GENERIC              Patient with a large 16 cm right gluteal mass concerning findings for malignancy.  Discussed with patient the importance of proceeding with biopsy.  I have ordered a stat biopsy at this time and will have her follow-up with me in the clinic to review the results.  I will also reach out to surgeon to alert him of the case.  We discussed surgical intervention and will make determination once biopsy results have been received.  Patient did verbalize understanding's and agreement with the plan.      Please note that this dictation was created using voice recognition software. I have made every reasonable attempt to correct obvious errors, but I expect that there are errors of grammar and possibly content that I did not discover before finalizing the note.

## 2022-11-10 NOTE — ADDENDUM NOTE
Encounter addended by: Mary Mcallister, Med Ass't on: 11/10/2022 9:57 AM   Actions taken: Charge Capture section accepted

## 2022-11-11 ENCOUNTER — HOSPITAL ENCOUNTER (OUTPATIENT)
Dept: RADIOLOGY | Facility: MEDICAL CENTER | Age: 42
End: 2022-11-11
Attending: NURSE PRACTITIONER
Payer: COMMERCIAL

## 2022-11-11 DIAGNOSIS — M79.18 GLUTEAL PAIN: ICD-10-CM

## 2022-11-11 DIAGNOSIS — R22.41 MASS OF RIGHT LOWER EXTREMITY: ICD-10-CM

## 2022-11-11 PROCEDURE — 87205 SMEAR GRAM STAIN: CPT

## 2022-11-11 PROCEDURE — 87015 SPECIMEN INFECT AGNT CONCNTJ: CPT

## 2022-11-11 PROCEDURE — 88307 TISSUE EXAM BY PATHOLOGIST: CPT

## 2022-11-11 PROCEDURE — 88305 TISSUE EXAM BY PATHOLOGIST: CPT

## 2022-11-11 PROCEDURE — 76942 ECHO GUIDE FOR BIOPSY: CPT

## 2022-11-11 PROCEDURE — 87070 CULTURE OTHR SPECIMN AEROBIC: CPT

## 2022-11-12 LAB
GRAM STN SPEC: NORMAL
PATHOLOGY CONSULT NOTE: NORMAL
SIGNIFICANT IND 70042: NORMAL
SITE SITE: NORMAL
SOURCE SOURCE: NORMAL

## 2022-11-12 NOTE — PROGRESS NOTES
OPIR     Right posterior upper thigh/inferior buttock Mass Biopsy done by Dr. Briscoe, with no sedation. Posterior thigh/inferior buttock access site; X 5 cores in Formalin, X1 core in RPMI, and X1 syringe for culture (washed in 6ml of saline by MD) obtained and sent to pathology.  Patient tolerated the procedure well.    All questions and concerns answered prior to being dc'd; pt provided with appropriate education for procedure, pt discharge to home.

## 2022-11-14 LAB
BACTERIA TISS AEROBE CULT: NORMAL
GRAM STN SPEC: NORMAL
SIGNIFICANT IND 70042: NORMAL
SITE SITE: NORMAL
SOURCE SOURCE: NORMAL

## 2022-11-16 ENCOUNTER — HOSPITAL ENCOUNTER (OUTPATIENT)
Dept: HEMATOLOGY ONCOLOGY | Facility: MEDICAL CENTER | Age: 42
End: 2022-11-16
Attending: NURSE PRACTITIONER
Payer: COMMERCIAL

## 2022-11-16 VITALS
SYSTOLIC BLOOD PRESSURE: 106 MMHG | TEMPERATURE: 98.2 F | HEIGHT: 65 IN | RESPIRATION RATE: 16 BRPM | DIASTOLIC BLOOD PRESSURE: 64 MMHG | OXYGEN SATURATION: 97 % | WEIGHT: 144.62 LBS | BODY MASS INDEX: 24.1 KG/M2 | HEART RATE: 86 BPM

## 2022-11-16 DIAGNOSIS — C49.9 MYXOID LIPOSARCOMA (HCC): ICD-10-CM

## 2022-11-16 PROCEDURE — 99213 OFFICE O/P EST LOW 20 MIN: CPT | Performed by: NURSE PRACTITIONER

## 2022-11-16 PROCEDURE — 99212 OFFICE O/P EST SF 10 MIN: CPT | Performed by: NURSE PRACTITIONER

## 2022-11-16 ASSESSMENT — PAIN SCALES - GENERAL: PAINLEVEL: NO PAIN

## 2022-11-16 ASSESSMENT — ENCOUNTER SYMPTOMS
NERVOUS/ANXIOUS: 1
CONSTIPATION: 0
NAUSEA: 0
FEVER: 0
VOMITING: 0
CHILLS: 0
DIARRHEA: 0

## 2022-11-16 ASSESSMENT — FIBROSIS 4 INDEX: FIB4 SCORE: 1.17

## 2022-11-16 NOTE — PROGRESS NOTES
"Subjective     Madison Allen is a 42 y.o. female who presents with Other (Biopsy Results)          HPI    Patient seen today in follow-up for biopsy results.  She presents unaccompanied for today's visit.    Patient stated that in June 2022 she noticed what she described as \"little ball\" in the posterior right lower extremity.  She was seen at the [facility] clinic at that time and had an ultrasound completed on 6/13/2022.  In reviewing the ultrasound report there was a heterogeneous areas of increased echogenicity within the substance of the right gluteus denise muscle, with central areas of abscess formation and phlegmon.  At that time it measured 14 x 5.9 x 14.6 cm in diameter. According to the patient she was told everything was fine and that it was just filled with \"liquid.\"  Patient then noticed that this mass has gotten bigger and became painful to walk.  She was then seen again at the \[facility]\""s clinic and this time was told to go to the emergency department.  She presented to the ER on 11/1/2022.  CT pelvis completed at that time showed a right gluteus denise 15 cm soft tissue mass with mostly fluid and fatty components highly worrisome for soft tissue sarcoma, liposarcoma.  Myxoma is also on the differential.  She was admitted to the hospital and recommended further work-up including biopsy.  MRI on 11/1/2022 of the right femur noted large right inferior buttock/upper posterior thigh mass which involves the right gluteus denise muscle as well as contacts the hamstrings muscles.  This measures approximately 16 x 10 x 7.3 cm in size with the same differentials.  CT chest, abdomen and pelvis completed 11/2/2022 showed no evidence of metastatic disease.  There is calcification of the right lower lobe liver which is indeterminate but may represent remote granulomatous disease.    Patient tolerated the biopsy fairly well.  She stated since the biopsy she has had increased pain.  She now has pain when sitting as " "well as continuing to have pain with ambulating.  She believes that the mass may be getting slightly bigger from what she can tell.  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  Pathologist stated that there was no round cell component identified within the limited sample.      Allergies   Allergen Reactions    Nkda [No Known Drug Allergy]      Current Outpatient Medications on File Prior to Encounter   Medication Sig Dispense Refill    etonogestrel (NEXPLANON) 68 MG Implant implant Inject 1 Each under the skin one time.       No current facility-administered medications on file prior to encounter.           Review of Systems   Constitutional:  Negative for chills and fever.   Gastrointestinal:  Negative for constipation, diarrhea, nausea and vomiting.   Psychiatric/Behavioral:  The patient is nervous/anxious.             Objective     /64 (BP Location: Left arm, Patient Position: Sitting, BP Cuff Size: Adult)   Pulse 86   Temp 36.8 °C (98.2 °F) (Temporal)   Resp 16   Ht 1.651 m (5' 5\")   Wt 65.6 kg (144 lb 10 oz)   SpO2 97%   BMI 24.07 kg/m²      Physical Exam  Vitals reviewed.   Constitutional:       General: She is not in acute distress.     Appearance: Normal appearance. She is not diaphoretic.   HENT:      Head: Normocephalic and atraumatic.   Cardiovascular:      Rate and Rhythm: Normal rate and regular rhythm.      Heart sounds: Normal heart sounds. No murmur heard.    No friction rub. No gallop.   Pulmonary:      Effort: Pulmonary effort is normal. No respiratory distress.      Breath sounds: No wheezing.   Musculoskeletal:         General: Tenderness (right posterior thigh mass - tender) present.   Skin:     General: Skin is warm and dry.   Neurological:      Mental Status: She is alert and oriented to person, place, and time.   Psychiatric:         Mood and Affect: Mood normal.         Behavior: Behavior normal.             FINAL DIAGNOSIS:     A. " Right posterior thigh mass core biopsies:          Small tissue cores demonstrating myxoid liposarcoma.          No round cell component identified in the limited sample.     Comment: The case is also reviewed by Dr. Saleh and Dr. Galo who   concur. The findings were discussed with CHERRI Gregory.   Molecular studies are not reflexively performed in an attempt to   preserve tissue. Block A2 has been cut through. In the event future   molecular studies are indicated, it is recommended to combine blocks A1   and A3 into one to enrich the block.        CT-CHEST,ABDOMEN,PELVIS W/O    Result Date: 11/2/2022 11/2/2022 4:18 PM HISTORY/REASON FOR EXAM:  Unknown primary, initial workup; Concern for a sarcoma and oncology is recommending a pan CT TECHNIQUE/EXAM DESCRIPTION: CT scan of the chest, abdomen and pelvis without contrast was performed. Noncontrast helical scanning of the chest, abdomen and pelvis was obtained from the lung apices through the pubic symphysis. Low dose optimization technique was utilized for this CT exam including automated exposure control and adjustment of the mA and/or kV according to patient size. COMPARISON:  CT pelvis yesterday. FINDINGS: No osteoblastic or lytic metastasis CT Chest: Lungs: No nodules or airspace process. Mediastinum/Nathaly: No significant adenopathy. Pleura: No pleural effusion. Cardiac: Heart normal in size without pericardial effusion. Vascular: Unremarkable. Soft tissues: Unremarkable. Bones: No acute or destructive process. CT Abdomen and Pelvis: Limited by lack of contrast Liver: No noncontrast evidence of mass. There is a coarse right lobe calcification.. Spleen: Unremarkable. Small splenule. No splenomegaly Pancreas: Unremarkable. Gallbladder: The gallbladder has been resected. Biliary: Nondilated. Adrenal glands: Normal. Kidneys: Unremarkable without hydronephrosis. Bowel: No obstruction or acute inflammation. Lymph nodes: No adenopathy. Vasculature: Unremarkable.  Peritoneum: Unremarkable without ascites. Musculoskeletal: No acute or destructive process. Pelvis: No adenopathy or free fluid. Normal size uterus and ovaries Normal configuration of the urinary bladder without abnormal distention Right gluteus denise centered mass is without change and characterized fully yesterday     No noncontrast evidence of metastasis Right gluteus denise centered mass is unchanged Cholecystectomy Calcification in the right lobe liver is indeterminate but may represent remote granulomatous disease    CT-PELVIS WITH    Result Date: 11/1/2022 11/1/2022 1:23 PM HISTORY/REASON FOR EXAM:  Soft tissue mass, pelvis, US/xray nondiagnostic; R buttock mass growing in size since June 2022. TECHNIQUE/EXAM DESCRIPTION AND NUMBER OF VIEWS: CT scan of the pelvis with contrast. Contrast-enhanced helical scanning of the pelvis was obtained from the iliac crests through the pubic symphysis following the bolus administration of 85 mL of Omnipaque 350 nonionic contrast without complication. Low dose optimization technique was utilized for this CT exam including automated exposure control and adjustment of the mA and/or kV according to patient size. COMPARISON:  6/13/2022 ultrasound. FINDINGS: There is a fairly well-circumscribed ovoid mass in the right gluteus denise muscle measuring 15 x 9 x 6 cm. On ultrasound this measured approximately 14 x 6 cm. This is characterized by areas of mostly fluid density but some fatty components are seen as  well. No extension beyond the confines of the muscle is seen. Some fat is seen peripheral to the margins. No pelvic lymphadenopathy is identified The uterus is heterogeneous without enlargement confirmed. There is no cystic adnexal mass. Urinary bladder is partially decompressed Visualized bowel is normal in appearance Bony structures are normal without osteolytic or blastic change.     Right gluteus denise 15 cm soft tissue mass with mostly fluid and fatty components  is highly worrisome for soft tissue sarcoma, liposarcoma. Myxoma should be considered especially given some fat along the margins. MRI thigh with and without contrast may  prove helpful to further evaluate the soft tissue. The mass is stable to slightly enlarged from June    MR-FEMUR-WITH & W/O RIGHT    Result Date: 11/2/2022 11/1/2022 5:41 PM HISTORY/REASON FOR EXAM: Right buttock mass. TECHNIQUE/EXAM DESCRIPTION: MRI of the RIGHT femur without and with contrast. The study was performed on a [employer] 1.5 Devika MRI scanner. T1 sagittal, T1 coronal, T1 axial, T2 fat-suppressed axial and coronal, and fast spin-echo inversion recovery sagittal images were obtained of the femur pre-contrast followed by T1 axial fat suppressed images post intravenous administration of 14 mL ProHance. COMPARISON: CT scan 11/1/2022 FINDINGS: Osseous structures/Cartilaginous surfaces: There is no evidence of marrow edema or fracture. No evidence of a bone lesion. There is no evidence of abnormal enhancement seen postcontrast administration. Soft tissue structures: There is a large soft tissue mass involving the right buttock inferiorly which extends into the right upper posterior thigh. This appears to involve the gluteus medius denise muscle as well as contacts the hamstrings muscles. This mass measures 16 x 10 x 7.3 cm in size and is characterized by heterogeneous increased T2 signal with mixed intermediate and increased T1 signal with heterogeneous enhancement. No evidence of inguinal adenopathy.     Large right inferior buttock/upper posterior thigh mass which involves the right gluteus denise muscle as well as contacts the hamstrings muscles. This lesion measures 16 x 10 x 7.3 cm in size and demonstrates some fatty elements with heterogeneous enhancement. Less likely process would be that of a liposarcoma to include myxoid variant liposarcoma.           Assessment & Plan       1. Myxoid liposarcoma (HCC)  Referral to General  Surgery              Patient with large right inferior buttock/upper posterior thigh mass recently diagnosed positive for myxoid liposarcoma.  I have personally spoken with oncology surgeon, Dr. Rojas who has agreed to see the patient.  Stat referral has been placed to surgeon.  I discussed with patient surgical intervention.  She did verbalize understanding is in agreement with the plan.      Please note that this dictation was created using voice recognition software. I have made every reasonable attempt to correct obvious errors, but I expect that there are errors of grammar and possibly content that I did not discover before finalizing the note.

## 2022-11-17 NOTE — ADDENDUM NOTE
Encounter addended by: Mary Mcallister, Med Ass't on: 11/16/2022 4:54 PM   Actions taken: Charge Capture section accepted

## 2022-11-22 ENCOUNTER — OFFICE VISIT (OUTPATIENT)
Dept: SURGICAL ONCOLOGY | Facility: MEDICAL CENTER | Age: 42
End: 2022-11-22
Payer: COMMERCIAL

## 2022-11-22 VITALS
OXYGEN SATURATION: 95 % | HEART RATE: 75 BPM | BODY MASS INDEX: 24.63 KG/M2 | DIASTOLIC BLOOD PRESSURE: 72 MMHG | TEMPERATURE: 98.4 F | WEIGHT: 148 LBS | SYSTOLIC BLOOD PRESSURE: 112 MMHG

## 2022-11-22 DIAGNOSIS — C49.9 MYXOID LIPOSARCOMA (HCC): ICD-10-CM

## 2022-11-22 PROCEDURE — 99205 OFFICE O/P NEW HI 60 MIN: CPT | Performed by: SURGERY

## 2022-11-22 ASSESSMENT — LIFESTYLE VARIABLES: SUBSTANCE_ABUSE: 0

## 2022-11-22 ASSESSMENT — ENCOUNTER SYMPTOMS
BLURRED VISION: 0
ABDOMINAL PAIN: 0
NECK PAIN: 0
BRUISES/BLEEDS EASILY: 0
SPUTUM PRODUCTION: 0
DOUBLE VISION: 0
EYE DISCHARGE: 0
WEIGHT LOSS: 0
VOMITING: 0
CONSTIPATION: 0
NERVOUS/ANXIOUS: 0
PALPITATIONS: 0
MYALGIAS: 0
COUGH: 0
HEADACHES: 0
SENSORY CHANGE: 0
FOCAL WEAKNESS: 0
DIARRHEA: 0
DIZZINESS: 0
BACK PAIN: 0
PHOTOPHOBIA: 0
CLAUDICATION: 0
EYE PAIN: 0
TINGLING: 0
HALLUCINATIONS: 0
HEMOPTYSIS: 0
SPEECH CHANGE: 0
DEPRESSION: 0
NAUSEA: 0
HEARTBURN: 0
ORTHOPNEA: 0
CHILLS: 0
FEVER: 0
TREMORS: 0

## 2022-11-22 ASSESSMENT — FIBROSIS 4 INDEX: FIB4 SCORE: 1.17

## 2022-11-22 NOTE — H&P
Surgical Oncology History and Physical    Date of Evaluation: 11/20/2022    Reason for Referral: Myxoid liposarcoma of the right posterior thigh    Referred By: CHERRI Gregory    HPI: Patient is a 42-year-old female otherwise healthy who presents for surgical evaluation for newly identified right posterior thigh myxoid liposarcoma.  The patient noted a small mass in her right gluteal region in June 2020.  Over the last few months it has started to increase significantly in size.  She not been symptomatic from it previously but it has started to cause her some discomfort with sitting down, driving, going to the bathroom.  She denies any pain, weakness, decreased sensation in the right lower extremity.  She was initially evaluated in our medical oncology clinic and a biopsy was performed.  The biopsy was consistent with a myxoid liposarcoma with no round cell component identified in the limited sample.    She presents to me today for to discuss the management options.    Summary of work-up to date:   CT pelvis with contrast ([facility]) 11/1/2022:Fairly well-circumscribed ovoid mass in the right gluteus denise muscle measuring 15 x 9 x 6 cm.  Characterized by areas of mostly fluid density but some fatty components are seen as well.  No extension beyond the confines of the muscle is seen.  Some fat is seen peripherally to the margins.  No lymphadenopathy.    MRI femur with and without contrast right ([facility]) 11/2/2022: Large soft tissue mass involving the right buttock inferiorly which extends into the right upper posterior thigh.  Appears to involve the gluteus medius denise muscle as well as contact the hamstrings muscle.  Measures 16 x 10 x 7.3 cm in size and characterized by heterogeneous increased T2 signal with mixed intermediate and increased T1 signal with heterogeneous enhancement.  No evidence of adenopathy.    CT chest abdomen pelvis without contrast ([facility]) 11/2/2022: No noncontrast evidence of  "metastasis.  Right gluteus denise centered mass is unchanged.  \"Prior cholecystectomy.  Calcification the right lobe of the liver is indeterminate but may represent remote granulomatous disease.    IR guided biopsy ([facility]) 2022: Pathology of right posterior thigh mass on core biopsy consistent with myxoid liposarcoma.  There is no round cell component identified in the limited sample.      Patient presents today for surgical evaluation of newly identified myxoid liposarcoma of the right gluteus muscle extending down to the right posterior thigh.  She states that she does feel as if the mass is grown since her biopsy was performed.  She does has discomfort directly over the area when she sits down, drives, goes to the bathroom.  She denies any neuromuscular symptoms of the right lower extremity and appears to have full function of the extremity.  She denies any fevers or chills.  No recent weight loss.  Denies any masses in any other places.    Body mass index is 24.63 kg/m².    ECO    Past Medical History:          Past Medical History:   Diagnosis Date    Pregnant state, incidental        Past Surgical History:        Past Surgical History:   Procedure Laterality Date    CHOLECYSTECTOMY  Dx     no complications       Current Medications:   Home Medications    Medication Sig Taking? Last Dose Authorizing Provider   etonogestrel (NEXPLANON) 68 MG Implant implant Inject 1 Each under the skin one time.   Physician Outpatient            Allergies:         Allergies   Allergen Reactions    Nkda [No Known Drug Allergy]        Family History:          Family History   Problem Relation Age of Onset    Heart Disease Mother 62        hearth attack    Diabetes Mother         pills    Hypertension Mother     Cancer Father         Lung    Alcohol/Drug Father         alcoholic    Thyroid Sister     Cancer Brother         Lung    Diabetes Brother         diet       Social History:          Social History "     Socioeconomic History    Marital status: Single     Spouse name: Not on file    Number of children: Not on file    Years of education: Not on file    Highest education level: Not on file   Occupational History    Not on file   Tobacco Use    Smoking status: Never    Smokeless tobacco: Never    Tobacco comments:     last used 9 years ago   Vaping Use    Vaping Use: Never used   Substance and Sexual Activity    Alcohol use: No     Comment: socially    Drug use: No    Sexual activity: Yes     Partners: Male     Birth control/protection: Condom   Other Topics Concern    Not on file   Social History Narrative    Not on file     Social Determinants of Health     Financial Resource Strain: Not on file   Food Insecurity: Not on file   Transportation Needs: Not on file   Physical Activity: Not on file   Stress: Not on file   Social Connections: Not on file   Intimate Partner Violence: Not on file   Housing Stability: Not on file       Review of Systems:  Review of Systems - History obtained from the patient    Review of Systems   Constitutional:  Negative for chills, fever, malaise/fatigue and weight loss.   HENT:  Negative for congestion, ear discharge, ear pain, hearing loss and nosebleeds.    Eyes:  Negative for blurred vision, double vision, photophobia, pain and discharge.   Respiratory:  Negative for cough, hemoptysis and sputum production.    Cardiovascular:  Negative for chest pain, palpitations, orthopnea and claudication.   Gastrointestinal:  Negative for abdominal pain, constipation, diarrhea, heartburn, nausea and vomiting.   Genitourinary:  Negative for dysuria, frequency, hematuria and urgency.   Musculoskeletal:  Negative for back pain, joint pain, myalgias and neck pain.   Skin:  Negative for itching and rash.   Neurological:  Negative for dizziness, tingling, tremors, sensory change, speech change, focal weakness and headaches.   Endo/Heme/Allergies:  Negative for environmental allergies. Does not  bruise/bleed easily.   Psychiatric/Behavioral:  Negative for depression, hallucinations, substance abuse and suicidal ideas. The patient is not nervous/anxious.      All other ROS negative.      Physical Exam:  /72 (BP Location: Right arm, Patient Position: Sitting, BP Cuff Size: Adult)   Pulse 75   Temp 36.9 °C (98.4 °F) (Temporal)   Wt 67.1 kg (148 lb)   SpO2 95%   BMI 24.63 kg/m²       Physical Exam  Constitutional:       General: She is not in acute distress.     Appearance: Normal appearance. She is not ill-appearing.   HENT:      Head: Normocephalic.   Eyes:      Conjunctiva/sclera: Conjunctivae normal.      Pupils: Pupils are equal, round, and reactive to light.   Cardiovascular:      Rate and Rhythm: Normal rate and regular rhythm.   Pulmonary:      Effort: Pulmonary effort is normal. No respiratory distress.   Abdominal:      General: Abdomen is flat.      Palpations: Abdomen is soft.   Musculoskeletal:         General: Deformity present.      Cervical back: Neck supple.      Comments: Right posterior gluteal region there is a palpable greater than 15 cm mass that extends down the posterior thigh.  It does feel mobile but is firm.  Neurovascularly in the right lower extremity her sensation and muscular function is completely intact 5 out of 5 strength in the foot the knee and thigh.   Neurological:      Mental Status: She is alert.            Imaging:   CT pelvis with contrast ([facility]) 11/1/2022:Fairly well-circumscribed ovoid mass in the right gluteus denise muscle measuring 15 x 9 x 6 cm.  Characterized by areas of mostly fluid density but some fatty components are seen as well.  No extension beyond the confines of the muscle is seen.  Some fat is seen peripherally to the margins.  No lymphadenopathy.    MRI femur with and without contrast right ([facility]) 11/2/2022: Large soft tissue mass involving the right buttock inferiorly which extends into the right upper posterior thigh.  Appears to  "involve the gluteus medius denise muscle as well as contact the hamstrings muscle.  Measures 16 x 10 x 7.3 cm in size and characterized by heterogeneous increased T2 signal with mixed intermediate and increased T1 signal with heterogeneous enhancement.  No evidence of adenopathy.    CT chest abdomen pelvis without contrast ([facility]) 11/2/2022: No noncontrast evidence of metastasis.  Right gluteus denise centered mass is unchanged.  \"Prior cholecystectomy.  Calcification the right lobe of the liver is indeterminate but may represent remote granulomatous disease.    Pathology:   IR guided biopsy ([facility]) 11/12/2022:    right posterior thigh mass on core biopsy consistent with myxoid liposarcoma.  There is no round cell component identified in the limited sample.    Labs:    Latest Reference Range & Units 11/02/22 01:53   WBC 4.8 - 10.8 K/uL 7.7   RBC 4.20 - 5.40 M/uL 4.68   Hemoglobin 12.0 - 16.0 g/dL 12.5   Hematocrit 37.0 - 47.0 % 39.3   MCV 81.4 - 97.8 fL 84.0   MCH 27.0 - 33.0 pg 26.7 (L)   MCHC 33.6 - 35.0 g/dL 31.8 (L)   RDW 35.9 - 50.0 fL 43.0   Platelet Count 164 - 446 K/uL 191   MPV 9.0 - 12.9 fL 11.2   Sodium 135 - 145 mmol/L 137   Potassium 3.6 - 5.5 mmol/L 3.9   Chloride 96 - 112 mmol/L 105   Co2 20 - 33 mmol/L 21   Anion Gap 7.0 - 16.0  11.0   Glucose 65 - 99 mg/dL 96   Bun 8 - 22 mg/dL 17   Creatinine 0.50 - 1.40 mg/dL 0.64   GFR (CKD-EPI) >60 mL/min/1.73 m 2 112   Calcium 8.5 - 10.5 mg/dL 9.1   (L): Data is abnormally low    Assessment and Plan:   Patient is a very pleasant 42-year-old female who presents for surgical evaluation for a newly identified right gluteal myxoid liposarcoma.     Today in clinic we reviewed her current work-up to date including her imaging which I did show the patient in clinic today.  I discussed her biopsy results.  I discussed with the patient and her daughter the diagnosis of sarcoma in general but specifically her myxoid liposarcoma.  I also explained that given the size " of this mass upfront resection will likely be difficult and I do have concern about its proximity to the right femur as well as to the right sciatic nerve.  Currently she does not have any neurovascular symptoms which is encouraging that the mass is not involving nor compressing the sciatic nerve.  I discussed with her and reviewed the NCCN guidelines regarding the management of sarcomas in general and specifically myxoid liposarcoma.  I would like her to meet with our radiation oncologist to discuss neoadjuvant radiation therapy as I think this will make the potential resection easier and allow us to achieve negative margins.  Additionally I did explain that from a surgical standpoint I think the extent of resection will be fairly extensive and it is possible that she may have some chronic weakness of the right lower extremity however if we are able to preserve the sciatic nerve that her overall function should be generally well-preserved.  The size of defect is concerning based on the size of the mass and I do think she will ultimately need flap coverage of this area to cover the wound.  Given this I will refer her to plastic surgery for further discussion of a potential flap however none of this would occur until after she has received her radiation.      Plan:  -Referral placed to radiation oncology, I discussed this case with Dr. Tian who is going to get her scheduled follow-up in clinic  -Would prefer preoperative radiation therapy as I think it will help with achieving negative margins during resection as well as reduce the risk of potential flap failure as she would not need radiation postoperatively  -I also discussed the potential need for adjuvant chemotherapy depending on the final pathology of this mass.  Currently there is no round cell component which would be the strongest indication for chemotherapy however this is based on a small core needle biopsies of the final path did show round cell  component I would recommend adjuvant chemotherapy.  Will coordinate with plastic surgery to evaluate the patient as it is likely that she will need flap coverage at the time of surgical resection.    The patient and family asked several great questions which were answered to  their satisfaction.  They  are in agreement with the care plan as outlined above.      Artie Rojas MD  Surgical Oncology  November 22, 2022, 8:24 AM

## 2022-12-02 ENCOUNTER — HOSPITAL ENCOUNTER (OUTPATIENT)
Dept: RADIATION ONCOLOGY | Facility: MEDICAL CENTER | Age: 42
End: 2022-12-31
Attending: RADIOLOGY

## 2022-12-02 VITALS
WEIGHT: 147.93 LBS | HEART RATE: 71 BPM | DIASTOLIC BLOOD PRESSURE: 79 MMHG | OXYGEN SATURATION: 98 % | SYSTOLIC BLOOD PRESSURE: 122 MMHG | BODY MASS INDEX: 24.62 KG/M2 | TEMPERATURE: 98 F

## 2022-12-02 DIAGNOSIS — C49.9 MYXOID LIPOSARCOMA (HCC): ICD-10-CM

## 2022-12-02 PROCEDURE — 99214 OFFICE O/P EST MOD 30 MIN: CPT | Performed by: RADIOLOGY

## 2022-12-02 PROCEDURE — 99205 OFFICE O/P NEW HI 60 MIN: CPT | Performed by: RADIOLOGY

## 2022-12-02 RX ORDER — TRAMADOL HYDROCHLORIDE 50 MG/1
50 TABLET ORAL EVERY 4 HOURS PRN
Qty: 84 TABLET | Refills: 0 | Status: SHIPPED | OUTPATIENT
Start: 2022-12-02 | End: 2022-12-16

## 2022-12-02 ASSESSMENT — PAIN SCALES - GENERAL: PAINLEVEL: 7=MODERATE-SEVERE PAIN

## 2022-12-02 ASSESSMENT — FIBROSIS 4 INDEX: FIB4 SCORE: 1.17

## 2022-12-02 NOTE — CONSULTS
RADIATION ONCOLOGY CONSULT    Patient name:  Madison Allen    Primary Physician:  Pcp Pt States None MRN: [id number]  CSN: [id number]   Referring physician:  Artie Rojas M.*  : 1980, 42 y.o.     DATE OF SERVICE: 2022    IDENTIFICATION: A 42 y.o. 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 on 2022  Stage prefix: Initial diagnosis  Laterality: Right  Biopsy of metastatic site performed: No    She is here at the kind request of Artie Caldwell M.*      HISTORY OF PRESENT ILLNESS:  Patient is a pleasant 42-year-old female who started noticing over the last few months increasing pain and mass over her right buttocks which does cause her discomfort while sitting down.  She underwent a CT pelvis 2022 with contrast which showed a fairly well-circumscribed ovoid mass in the right gluteus denise muscle measuring 15 x 9 x 6 cm.  No adenopathy.  MRI femur right showed large soft tissue mass involving right buttocks inferiorly extending to the right upper posterior thigh measuring 16 x 10 x 7.3 cm in size.  CT chest and pelvis 2022 showed no distant metastatic disease.  Patient underwent went to IR guided biopsy 2022 with pathology showing right posterior thigh mass on core biopsy consistent with myxoid liposarcoma.  Patient was seen by Dr. Rojas who recommended preoperative radiation prior to surgery.  Patient overall doing well does have some pain taking Tylenol which does not help enough.    PROBLEM LIST:  Patient Active Problem List   Diagnosis    Supervision of other high-risk pregnancy    AMA (advanced maternal age) multigravida 35+    Thrombocytopenia (HCC)    Single umbilical artery- start NST at 32 weeks; ff-up fetal growth    Labor and delivery, indication for care    R gluteal pain and mass/swellinh    Myxoid  liposarcoma (HCC)        PAST SURGICAL HISTORY:  Past Surgical History:   Procedure Laterality Date    CHOLECYSTECTOMY  Dx 2006    no complications       CURRENT MEDICATIONS:  Current Outpatient Medications   Medication Sig Dispense Refill    traMADol (ULTRAM) 50 MG Tab Take 1 Tablet by mouth every four hours as needed for Moderate Pain for up to 14 days. 84 Tablet 0    etonogestrel (NEXPLANON) 68 MG Implant implant Inject 1 Each under the skin one time.       No current facility-administered medications for this encounter.       ALLERGIES:    Nkda [no known drug allergy]    FAMILY HISTORY:    family history includes Alcohol/Drug in her father; Cancer in her brother and father; Diabetes in her brother and mother; Heart Disease (age of onset: 62) in her mother; Hypertension in her mother; Thyroid in her sister.  Patient currently resides alone in [place]. She is a / with [employer]. She is accompanied to this visit by a friend.    SOCIAL HISTORY:     reports that she has never smoked. She has never used smokeless tobacco. She reports that she does not drink alcohol and does not use drugs.    REVIEW OF SYSTEMS:    A complete review of systems taken. Pertinent items in HPI. All others negative.    PHYSICAL EXAM:    PERFORMANCE STATUS:       View : No data to display.                   View : No data to display.              /79   Pulse 71   Temp 36.7 °C (98 °F)   Wt 67.1 kg (147 lb 14.9 oz)   SpO2 98%   BMI 24.62 kg/m²   Physical Exam  Vitals reviewed.   HENT:      Head: Normocephalic.   Eyes:      Pupils: Pupils are equal, round, and reactive to light.   Cardiovascular:      Rate and Rhythm: Normal rate.   Pulmonary:      Effort: Pulmonary effort is normal.   Abdominal:      General: Abdomen is flat.   Musculoskeletal:         General: Normal range of motion.        Legs:    Neurological:      General: No focal deficit present.      Mental Status: She is alert.   Psychiatric:         Mood  and Affect: Mood normal.        LABORATORY DATA:   Lab Results   Component Value Date/Time    WBC 7.7 11/02/2022 01:53 AM    RBC 4.68 11/02/2022 01:53 AM    HEMOGLOBIN 12.5 11/02/2022 01:53 AM    HEMATOCRIT 39.3 11/02/2022 01:53 AM    MCV 84.0 11/02/2022 01:53 AM    MCH 26.7 (L) 11/02/2022 01:53 AM    MCHC 31.8 (L) 11/02/2022 01:53 AM    RDW 43.0 11/02/2022 01:53 AM    PLATELETCT 191 11/02/2022 01:53 AM    MPV 11.2 11/02/2022 01:53 AM    NEUTSPOLYS 58.10 11/01/2022 12:29 PM    LYMPHOCYTES 30.00 11/01/2022 12:29 PM    MONOCYTES 6.50 11/01/2022 12:29 PM    EOSINOPHILS 4.10 11/01/2022 12:29 PM    BASOPHILS 0.80 11/01/2022 12:29 PM      Lab Results   Component Value Date/Time    SODIUM 137 11/02/2022 01:53 AM    POTASSIUM 3.9 11/02/2022 01:53 AM    CHLORIDE 105 11/02/2022 01:53 AM    CO2 21 11/02/2022 01:53 AM    GLUCOSE 96 11/02/2022 01:53 AM    BUN 17 11/02/2022 01:53 AM    CREATININE 0.64 11/02/2022 01:53 AM    CREATININE 0.8 09/06/2007 10:25 AM           RADIOLOGY DATA:  CT-CHEST,ABDOMEN,PELVIS W/O    Result Date: 11/2/2022  No noncontrast evidence of metastasis Right gluteus denise centered mass is unchanged Cholecystectomy Calcification in the right lobe liver is indeterminate but may represent remote granulomatous disease    CT-PELVIS WITH    Result Date: 11/1/2022  Right gluteus denise 15 cm soft tissue mass with mostly fluid and fatty components is highly worrisome for soft tissue sarcoma, liposarcoma. Myxoma should be considered especially given some fat along the margins. MRI thigh with and without contrast may  prove helpful to further evaluate the soft tissue. The mass is stable to slightly enlarged from June    MR-FEMUR-WITH & W/O RIGHT    Result Date: 11/2/2022  Large right inferior buttock/upper posterior thigh mass which involves the right gluteus denise muscle as well as contacts the hamstrings muscles. This lesion measures 16 x 10 x 7.3 cm in size and demonstrates some fatty elements with  heterogeneous enhancement. Less likely process would be that of a liposarcoma to include myxoid variant liposarcoma.    IR-BIOPSY-GENERIC    Result Date: 11/12/2022  Successful core biopsy and aspiration of right gluteal lesion.      IMPRESSION:    A 42 y.o. 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 on 12/2/2022  Stage prefix: Initial diagnosis  Laterality: Right  Biopsy of metastatic site performed: No      RECOMMENDATIONS:   I discussed the role of preoperative radiation for the treatment of soft tissue sarcoma.  I recommended 50 Gray in 25 fractions followed by resection by Dr. Rojas.  We discussed risk benefits patient is amenable to treatment and will undergo CT mapping on Monday with plan to start treatment the following week.  I have given her prescription of tramadol 50 mg every 6 hours given her significant pain.    Thank you for the opportunity to participate in her care.  If any questions or comments, please do not hesitate in calling.    Orders Placed This Encounter    Rad Onc Treatment Planning CT Simulation    traMADol (ULTRAM) 50 MG Tab

## 2022-12-02 NOTE — PROGRESS NOTES
Patient was seen today in clinic with Dr. Tian for consult.  Vitals signs and weight were obtained and pain assessment was completed.  Allergies and medications were reviewed with the patient.       Vitals/Pain:  Vitals:    12/02/22 0832   BP: 122/79   Pulse: 71   Temp: 36.7 °C (98 °F)   SpO2: 98%   Weight: 67.1 kg (147 lb 14.9 oz)   Pain Score: 7=Moderate-Severe Pain        Allergies:   Nkda [no known drug allergy]    Current Medications:  Current Outpatient Medications   Medication Sig Dispense Refill    etonogestrel (NEXPLANON) 68 MG Implant implant Inject 1 Each under the skin one time.       No current facility-administered medications for this encounter.         PCP:  Pcp Pt States None        Maty Paris R.N.

## 2022-12-02 NOTE — CT SIMULATION
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 on 12/2/2022  Stage prefix: Initial diagnosis  Laterality: Right  Biopsy of metastatic site performed: No       Treatment Planning CT Simulation        Order Questions       Question Answer Comment    Is this for a new course of treatment? Yes     Is this an Addendum? No     Implanted Device/Pacemaker No     Simulation Status Initial     Planned Start Date 12/12/2022     Treatment Site Pelvis     Laterality Right     Treatment Technique IMRT     Treatment Pattern/Frequency Daily 25 tx    Concurrent Chemotherapy No     CT Technique 3D     Slice Thickness 2mm     Scan Extent Pelvis right buttock    Treatment Device(s) Vac Gabriel     Treatment Marker Scar     Patient Attire Gown     Patient Position Prone right buttock, move left leg away if possible    Patient Orientation Head First     Arm Position On Chest     Treatment Image Guidance CBCT      KV/KV     Frequency (KV/KV) Daily     Image Guidance Match Bone     Treatment Planning Image Fusion CT/MR     Other Orders Weekly Physics Check

## 2022-12-05 ENCOUNTER — HOSPITAL ENCOUNTER (OUTPATIENT)
Dept: RADIATION ONCOLOGY | Facility: MEDICAL CENTER | Age: 42
End: 2022-12-31
Attending: RADIOLOGY

## 2022-12-05 PROCEDURE — 77334 RADIATION TREATMENT AID(S): CPT | Mod: 26 | Performed by: RADIOLOGY

## 2022-12-05 PROCEDURE — 77334 RADIATION TREATMENT AID(S): CPT | Performed by: RADIOLOGY

## 2022-12-05 PROCEDURE — 77290 THER RAD SIMULAJ FIELD CPLX: CPT | Performed by: RADIOLOGY

## 2022-12-05 PROCEDURE — 77263 THER RADIOLOGY TX PLNG CPLX: CPT | Performed by: RADIOLOGY

## 2022-12-06 ENCOUNTER — PATIENT OUTREACH (OUTPATIENT)
Dept: ONCOLOGY | Facility: MEDICAL CENTER | Age: 42
End: 2022-12-06
Payer: COMMERCIAL

## 2022-12-06 DIAGNOSIS — Z65.8 PSYCHOSOCIAL DISTRESS: ICD-10-CM

## 2022-12-06 NOTE — PROGRESS NOTES
RTD  CHOCO Santoskelsie stated pt. Wanted to speak with me during lunch and she requested a call back.     Outbound call to patient, patient stated she no longer has [facility] and would like to connect with SANJAY Shoemaker. Patient also stated she connected with OSCHARLENE Guillen about her insurance, I informed pt. That I would would route her this encounter. No further needs at this time.

## 2022-12-06 NOTE — RADIATION COMPLETION NOTES
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 on 12/2/2022  Stage prefix: Initial diagnosis  Laterality: Right  Biopsy of metastatic site performed: No         IMAGING REVIEWED:  [] CT     [x] MRI     [] PET/CT     [] BONE SCAN     [] MAMMO     [] OTHER      TREATMENT INTENT:   [x] CURATIVE     [] MAINTENANCE     []  PALLIATIVE      []  SUPPORTIVE     []  PROPHYLACTIC     [] BENIGN     []  CONSOLIDATIVE      [] DEFINITIVE   []  OLOGIMETASTATIC      LINE OF TREATMENT:  [] ADJUVANT   [] DEFINITIVE   [x] NEOADJUVANT   [] RE-TREATMENT      TECHNIQUE PLANNED:  [x] IMRT   [] 3D   [] SBRT   [] SRS/SRT   [] HDR   [] ELECTRON       IMRT JUSTIFICATION:  []   An immediately adjacent area has been previously irradiated and abutting portals must be established with high precision.    []  Dose escalation is planned to deliver radiation doses in excess of those commonly utilized for similar tumors with conventional treatment.    [x]  The target volume is concave or convex, and the critical normal tissues are within or around that convexity or concavity.    [x]  The target volume is in close proximity to critical structures that must be protected.    []  The volume of interest must be covered with narrow margins to adequately protect  immediately adjacent structures.      FIELDS & BLOCKING:  [x] COMPLEX BLOCKS     []  = 3 TX AREAS     [x]  ARCS     []  CUSTOM SHEILD        []  SIMPLE BLOCK      CHEMOTHERAPY:  []  CONCURRENT     []  INDUCTION     [] SEQUENTIAL     []  <30 DAYS FROM XRT      NOTES:  OAR CONSTRAINTS: (GUIDELINES ONLY NOT ABSOLUTE)   Target Prescribed Coverage   PTV 95% of PTV covered by 100% (cGy) of RX Dose     PTV 99% of PTV covered by 93% (cGy) of RX Dose       TATA Goal   Rectum/Sigmoid V40Gy < 60%   Bladder V45Gy < 35%    R Femoral Head V30Gy < 15%   L Femoral Head V30Gy < 15%   Bowel (small & large) V40Gy < 30%   Individual Small Bowel Loops V15Gy < 120cc   Entire Cavity Small Bowel V45Gy < 195cc   *RTOG 0921, QUANTEC

## 2022-12-06 NOTE — RADIATION COMPLETION NOTES
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 on 12/2/2022  Stage prefix: Initial diagnosis  Laterality: Right  Biopsy of metastatic site performed: No       DATE OF SERVICE: 12/5/2022    TYPE OF SIMULATION: Pelvis    GOAL OF TREATMENT:   [x] Curative  [] Palliative  [] Oligometastatic    CONTRAST:    [] IV Contrast*  [] Small Bowel  [] Rectal  [] Urethral              POSITION:    []  Supine  [x] Prone     COMPLEX:  [x] Complex Blocking   [x]Arcs  [] Custom Blocks  [] >3 Sites    PROCEDURE: Patient positioned on CT table in Vac-Gabriel immobilization device prone. CT acquired thorough the entire volume of interest.  Images reviewed and exported to treatment planning system.    I have personally reviewed the relevant data, performed the target localization, and determined all relevant factors for this patient’s simulation.    *Omnipaque 80 -100cc IVP in conjunction with 500cc NS

## 2022-12-07 ENCOUNTER — PATIENT OUTREACH (OUTPATIENT)
Dept: ONCOLOGY | Facility: MEDICAL CENTER | Age: 42
End: 2022-12-07
Payer: COMMERCIAL

## 2022-12-07 ENCOUNTER — DOCUMENTATION (OUTPATIENT)
Dept: ONCOLOGY | Facility: MEDICAL CENTER | Age: 42
End: 2022-12-07
Payer: COMMERCIAL

## 2022-12-07 NOTE — PROGRESS NOTES
Email received from Radha at Access to [facility] stating:  Madison Camara is no longer active in our program effective 12/6/2022.

## 2022-12-07 NOTE — PROGRESS NOTES
Late Entry for 12/06/2022-  Oncology Social Worker Mindy Chavez and Oncology Nurse Navigator called patient to follow up on referral.  OSW served as an  and spoke Fijian the entire time.  ONN and OSW introduced ourselves and our roles. OSW spoke to the patient about Access to Healthcare and her up and coming radiation treatments.  Patient stated that it would be $700 a week and that she would like to be released from [facility] due to the costs of her treatments and convert to self pay.  Patient stated she was just waiting to talk with CHIO Pereira regarding her options as a self pay.  Patient said she was trying to remain positive but does relate her distress a 6 related to her treatment decisions.  Patient is from [place] she is a single mother of 2 boys Jorge 11 and Edu 8 years old.  She has not told them that she has cancer but they know there is a visible ball on her gluteus backside and that she is getting treatment to get it removed.  She states that she has a niece and a brother in town for her support.  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  She has worked there for 1 year and 7 months and is able to take time from work but it is not paid time.  She is able to drive herself and if she cannot she has her niece or brother to assist her.  She states that she is to have 25 treatments then she will have a rest period then she will have surgery.  OSW is going to provide financial support sent applications for the [employer] [facility] and an application for a gas card.

## 2022-12-07 NOTE — PROGRESS NOTES
"On December 6th, 2022, Oncology Social Worker Mindy Chavez and Oncology Nurse Navigator Serenity Meyer contacted pt. via telephone.  CARLOS Chavez served as  throughout encounter between EVELIO Meyer and pt.   Pt. shared things are going okay.  Pt. shared she is trying to get a hold of [facility] to [facility].  Pt. shared it is going to cost $700 per week and she cannot afford it.  Pt. shared she tried reaching out to [facility] Financial Resource Advocate Nvaid Pereira.  CARLOS Chavez explained she would be able to get pt. connected with FRA once her [facility] membership has been termed.      Pt. shared she is a 6 on DST re: \"it was higher prior to OSCHARLENE Chavez calling.  Confident everything will be okay.\"      Pt. shared she is undocumented.  Pt. shared she is originally from [place].  Pt. shared she has been living in the [place] for the past 22 years.      Pt. shared she lives with her two children, Jorge (11 years old) and Montrell (8 years old).      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 shared she has been working in this factory for the past year and 7 months.  Pt. shared she works the night shift full time from 8 pm to 4:30 am.      Pt. shared she will drive herself to appointments and if she is not able to she has a brother and a niece who can provide transportation.      Pt. will notify CARLOS Chavez via telephone once she has termed her [facility] MDP membership.      CARLOS Chavez informed pt. she will be mailing her [facility] [employer] [place] application for pt. to complete.     "

## 2022-12-12 PROCEDURE — 77301 RADIOTHERAPY DOSE PLAN IMRT: CPT | Mod: 26 | Performed by: RADIOLOGY

## 2022-12-12 PROCEDURE — 77301 RADIOTHERAPY DOSE PLAN IMRT: CPT | Performed by: RADIOLOGY

## 2022-12-12 PROCEDURE — 77300 RADIATION THERAPY DOSE PLAN: CPT | Mod: 26 | Performed by: RADIOLOGY

## 2022-12-12 PROCEDURE — 77300 RADIATION THERAPY DOSE PLAN: CPT | Performed by: RADIOLOGY

## 2022-12-12 PROCEDURE — 77338 DESIGN MLC DEVICE FOR IMRT: CPT | Mod: 26 | Performed by: RADIOLOGY

## 2022-12-12 PROCEDURE — 77338 DESIGN MLC DEVICE FOR IMRT: CPT | Performed by: RADIOLOGY

## 2022-12-13 ENCOUNTER — HOSPITAL ENCOUNTER (OUTPATIENT)
Dept: RADIATION ONCOLOGY | Facility: MEDICAL CENTER | Age: 42
End: 2022-12-13

## 2022-12-13 LAB
CHEMOTHERAPY INFUSION START DATE: NORMAL
CHEMOTHERAPY RECORDS: 2
CHEMOTHERAPY RECORDS: 5000
CHEMOTHERAPY RECORDS: NORMAL
CHEMOTHERAPY RX CANCER: NORMAL
DATE 1ST CHEMO CANCER: NORMAL
RAD ONC ARIA COURSE LAST TREATMENT DATE: NORMAL
RAD ONC ARIA COURSE TREATMENT ELAPSED DAYS: NORMAL
RAD ONC ARIA REFERENCE POINT DOSAGE GIVEN TO DATE: 2
RAD ONC ARIA REFERENCE POINT DOSAGE GIVEN TO DATE: 2.07
RAD ONC ARIA REFERENCE POINT ID: NORMAL
RAD ONC ARIA REFERENCE POINT ID: NORMAL
RAD ONC ARIA REFERENCE POINT SESSION DOSAGE GIVEN: 2
RAD ONC ARIA REFERENCE POINT SESSION DOSAGE GIVEN: 2.07

## 2022-12-13 PROCEDURE — 77280 THER RAD SIMULAJ FIELD SMPL: CPT | Performed by: RADIOLOGY

## 2022-12-13 PROCEDURE — 77386 HCHG IMRT DELIVERY COMPLEX: CPT | Performed by: RADIOLOGY

## 2022-12-14 ENCOUNTER — HOSPITAL ENCOUNTER (OUTPATIENT)
Dept: RADIATION ONCOLOGY | Facility: MEDICAL CENTER | Age: 42
End: 2022-12-31
Attending: RADIOLOGY
Payer: COMMERCIAL

## 2022-12-14 ENCOUNTER — HOSPITAL ENCOUNTER (OUTPATIENT)
Dept: RADIATION ONCOLOGY | Facility: MEDICAL CENTER | Age: 42
End: 2022-12-31
Attending: RADIOLOGY

## 2022-12-14 VITALS
OXYGEN SATURATION: 99 % | TEMPERATURE: 97.6 F | DIASTOLIC BLOOD PRESSURE: 66 MMHG | SYSTOLIC BLOOD PRESSURE: 102 MMHG | HEART RATE: 75 BPM | WEIGHT: 148 LBS | BODY MASS INDEX: 24.63 KG/M2

## 2022-12-14 LAB
CHEMOTHERAPY INFUSION START DATE: NORMAL
CHEMOTHERAPY RECORDS: 2
CHEMOTHERAPY RECORDS: 5000
CHEMOTHERAPY RECORDS: NORMAL
CHEMOTHERAPY RX CANCER: NORMAL
DATE 1ST CHEMO CANCER: NORMAL
RAD ONC ARIA COURSE LAST TREATMENT DATE: NORMAL
RAD ONC ARIA COURSE TREATMENT ELAPSED DAYS: NORMAL
RAD ONC ARIA REFERENCE POINT DOSAGE GIVEN TO DATE: 4
RAD ONC ARIA REFERENCE POINT DOSAGE GIVEN TO DATE: 4.13
RAD ONC ARIA REFERENCE POINT ID: NORMAL
RAD ONC ARIA REFERENCE POINT ID: NORMAL
RAD ONC ARIA REFERENCE POINT SESSION DOSAGE GIVEN: 2
RAD ONC ARIA REFERENCE POINT SESSION DOSAGE GIVEN: 2.07

## 2022-12-14 PROCEDURE — 77386 HCHG IMRT DELIVERY COMPLEX: CPT | Performed by: RADIOLOGY

## 2022-12-14 PROCEDURE — 77014 PR CT GUIDANCE PLACEMENT RAD THERAPY FIELDS: CPT | Mod: 26 | Performed by: RADIOLOGY

## 2022-12-14 ASSESSMENT — PAIN SCALES - GENERAL: PAINLEVEL: NO PAIN

## 2022-12-14 ASSESSMENT — FIBROSIS 4 INDEX: FIB4 SCORE: 1.17

## 2022-12-14 NOTE — ON TREATMENT VISIT
"ON TREATMENT  NOTE  RADIATION ONCOLOGY DEPARTMENT    Patient name:  Madison Allen    Primary Physician:  Pcp Pt States None MRN: [id number]  CSN: [id number]   Referring physician:  Artie Rojas M.*   : 1980, 42 y.o.     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 on 2022  Stage prefix: Initial diagnosis  Laterality: Right  Biopsy of metastatic site performed: No      TREATMENT SUMMARY:  Course First Treatment Date 2022  Course Last Treatment Date 2022  Radiation Treatments       Active   Plans   R Buttock   Most recent treatment: Dose planned: 200 cGy (fraction 2 of 25 on 2022)   Total: Dose planned: 5,000 cGy   Elapsed Days: 1 @            Historical   No historical radiation treatments to show.                 SUBJECTIVE:  Well appearing    VITAL SIGNS:      2022     1:38 PM 2022     8:32 AM 2022     8:37 AM 2022     3:32 PM 2022    10:06 AM 2022     4:49 PM 2022     6:49 AM   Vitals   SYSTOLIC 102 122 112 106 94 100 98   DIASTOLIC 66 79 72 64 72 58 54   Pulse 75 71 75 86 73 78 63   Temperature 36.4 °C (97.6 °F) 36.7 °C (98 °F) 36.9 °C (98.4 °F) 36.8 °C (98.2 °F) 36.4 °C (97.6 °F) 36.7 °C (98 °F) 36.4 °C (97.6 °F)   Respiration    16 18 16 18   Weight 148 147.93 148 144.62 146.94     Height    1.651 m (5' 5\") 1.651 m (5' 5\")     BMI 24.63 kg/m2 24.62 kg/m2 24.63 kg/m2 24.07 kg/m2 24.45 kg/m2     Pulse Oximetry 99 % 98 % 95 % 97 % 97 % 95 % 97 %     KPS: 100, Fully active, able to carry on all pre-disease performed without restriction (ECOG equivalent 0)  Encounter Vitals  Temperature: 36.4 °C (97.6 °F)  Blood Pressure: 102/66  Pulse: 75  Pulse Oximetry: 99 %  Weight: 67.1 kg (148 lb)  Pain Score: No pain      2022     1:38 PM 2022     8:32 AM " 11/16/2022     3:32 PM 11/9/2022    10:06 AM   Pain Assessment   Pain Score NO PAIN 7=MODERATE-S NO PAIN NO PAIN          PHYSICAL EXAM:  Physical Exam         12/14/2022     1:42 PM   Toxicity Assessment   Toxicity Assessment Skin   Fatigue (lethargy, malaise, asthenia) None   Radiation Dermatitis None   Photosensitivity None   Dry Skin Normal   Alopecia Normal   Erythema Multiforme (e.g., Brice-Kal syndrome, toxic epidermal necrolysis) Absent   Nail Changes Normal   Wound (Non-Infectious) None   Wound (Infectious) None   RT - Pain due to RT None   Tumor Pain (onset or exacerbation of tumor pain due to treatment) None   Skin - Late RT No change from baseline       CURRENT MEDICATIONS:    Current Outpatient Medications:     traMADol (ULTRAM) 50 MG Tab, Take 1 Tablet by mouth every four hours as needed for Moderate Pain for up to 14 days., Disp: 84 Tablet, Rfl: 0    etonogestrel (NEXPLANON) 68 MG Implant implant, Inject 1 Each under the skin one time., Disp: , Rfl:     LABORATORY DATA:   Lab Results   Component Value Date/Time    SODIUM 137 11/02/2022 01:53 AM    POTASSIUM 3.9 11/02/2022 01:53 AM    CHLORIDE 105 11/02/2022 01:53 AM    CO2 21 11/02/2022 01:53 AM    GLUCOSE 96 11/02/2022 01:53 AM    BUN 17 11/02/2022 01:53 AM    CREATININE 0.64 11/02/2022 01:53 AM    CREATININE 0.8 09/06/2007 10:25 AM       Lab Results   Component Value Date/Time    WBC 7.7 11/02/2022 01:53 AM    RBC 4.68 11/02/2022 01:53 AM    HEMOGLOBIN 12.5 11/02/2022 01:53 AM    HEMATOCRIT 39.3 11/02/2022 01:53 AM    MCV 84.0 11/02/2022 01:53 AM    MCH 26.7 (L) 11/02/2022 01:53 AM    MCHC 31.8 (L) 11/02/2022 01:53 AM    PLATELETCT 191 11/02/2022 01:53 AM         RADIOLOGY DATA:  CT-CHEST,ABDOMEN,PELVIS W/O    Result Date: 11/2/2022  No noncontrast evidence of metastasis Right gluteus denise centered mass is unchanged Cholecystectomy Calcification in the right lobe liver is indeterminate but may represent remote granulomatous  disease    CT-PELVIS WITH    Result Date: 11/1/2022  Right gluteus denise 15 cm soft tissue mass with mostly fluid and fatty components is highly worrisome for soft tissue sarcoma, liposarcoma. Myxoma should be considered especially given some fat along the margins. MRI thigh with and without contrast may  prove helpful to further evaluate the soft tissue. The mass is stable to slightly enlarged from June    MR-FEMUR-WITH & W/O RIGHT    Result Date: 11/2/2022  Large right inferior buttock/upper posterior thigh mass which involves the right gluteus denise muscle as well as contacts the hamstrings muscles. This lesion measures 16 x 10 x 7.3 cm in size and demonstrates some fatty elements with heterogeneous enhancement. Less likely process would be that of a liposarcoma to include myxoid variant liposarcoma.    IR-BIOPSY-GENERIC    Result Date: 11/12/2022  Successful core biopsy and aspiration of right gluteal lesion.      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 on 12/2/2022      PLAN:  No change in treatment plan    Disposition:  Treatment plan and imaging reviewed. Questions answered. Continue therapy outlined.     Charles Tian M.D.    No orders of the defined types were placed in this encounter.

## 2022-12-14 NOTE — CT SIMULATION
DATE OF SERVICE: 12/13/2022    Radiation Therapy Episodes       Active Episodes       Radiation Therapy: IMRT (12/12/2022)                   Radiation Treatments         Plan Last Treated On Elapsed Days Fractions Treated Prescribed Fraction Dose (cGy) Prescribed Total Dose (cGy)    R Buttock 12/13/2022 0 @ [id number] 1 of 25 200 5,000                  Reference Point Last Treated On Elapsed Days Most Recent Session Dose (cGy) Total Dose (cGy)    PTV 12/13/2022 0 @ [id number] 200 200    R Buttock cp 12/13/2022 0 @ [id number] 207 207                            First Visit Simple Simulation: Called by [employer] machine to verify treatment parameters including:  treatment site, treatment dose, and treatment setup prior to first treatment. Image derived shifts reviewed in all appropriate plains.  Shifts approved.  Patient treated.    I have personally reviewed the relevant data, performed the target localization, and determined all relevant factors for this patient’s simulation.

## 2022-12-15 ENCOUNTER — HOSPITAL ENCOUNTER (OUTPATIENT)
Dept: RADIATION ONCOLOGY | Facility: MEDICAL CENTER | Age: 42
End: 2022-12-15
Payer: COMMERCIAL

## 2022-12-15 LAB
CHEMOTHERAPY INFUSION START DATE: NORMAL
CHEMOTHERAPY RECORDS: 2
CHEMOTHERAPY RECORDS: 5000
CHEMOTHERAPY RECORDS: NORMAL
CHEMOTHERAPY RX CANCER: NORMAL
DATE 1ST CHEMO CANCER: NORMAL
RAD ONC ARIA COURSE LAST TREATMENT DATE: NORMAL
RAD ONC ARIA COURSE TREATMENT ELAPSED DAYS: NORMAL
RAD ONC ARIA REFERENCE POINT DOSAGE GIVEN TO DATE: 6
RAD ONC ARIA REFERENCE POINT DOSAGE GIVEN TO DATE: 6.2
RAD ONC ARIA REFERENCE POINT ID: NORMAL
RAD ONC ARIA REFERENCE POINT ID: NORMAL
RAD ONC ARIA REFERENCE POINT SESSION DOSAGE GIVEN: 2
RAD ONC ARIA REFERENCE POINT SESSION DOSAGE GIVEN: 2.07

## 2022-12-15 PROCEDURE — 77386 HCHG IMRT DELIVERY COMPLEX: CPT | Performed by: RADIOLOGY

## 2022-12-15 PROCEDURE — 77014 PR CT GUIDANCE PLACEMENT RAD THERAPY FIELDS: CPT | Mod: 26 | Performed by: RADIOLOGY

## 2022-12-15 PROCEDURE — 77336 RADIATION PHYSICS CONSULT: CPT | Performed by: RADIOLOGY

## 2022-12-16 ENCOUNTER — HOSPITAL ENCOUNTER (OUTPATIENT)
Dept: RADIATION ONCOLOGY | Facility: MEDICAL CENTER | Age: 42
End: 2022-12-16
Payer: COMMERCIAL

## 2022-12-16 LAB
CHEMOTHERAPY INFUSION START DATE: NORMAL
CHEMOTHERAPY RECORDS: 2
CHEMOTHERAPY RECORDS: 5000
CHEMOTHERAPY RECORDS: NORMAL
CHEMOTHERAPY RX CANCER: NORMAL
DATE 1ST CHEMO CANCER: NORMAL
RAD ONC ARIA COURSE LAST TREATMENT DATE: NORMAL
RAD ONC ARIA COURSE TREATMENT ELAPSED DAYS: NORMAL
RAD ONC ARIA REFERENCE POINT DOSAGE GIVEN TO DATE: 8
RAD ONC ARIA REFERENCE POINT DOSAGE GIVEN TO DATE: 8.26
RAD ONC ARIA REFERENCE POINT ID: NORMAL
RAD ONC ARIA REFERENCE POINT ID: NORMAL
RAD ONC ARIA REFERENCE POINT SESSION DOSAGE GIVEN: 2
RAD ONC ARIA REFERENCE POINT SESSION DOSAGE GIVEN: 2.07

## 2022-12-16 PROCEDURE — 77014 PR CT GUIDANCE PLACEMENT RAD THERAPY FIELDS: CPT | Mod: 26 | Performed by: RADIOLOGY

## 2022-12-16 PROCEDURE — 77386 HCHG IMRT DELIVERY COMPLEX: CPT | Performed by: RADIOLOGY

## 2022-12-19 ENCOUNTER — HOSPITAL ENCOUNTER (OUTPATIENT)
Dept: RADIATION ONCOLOGY | Facility: MEDICAL CENTER | Age: 42
End: 2022-12-19
Payer: COMMERCIAL

## 2022-12-19 LAB
CHEMOTHERAPY INFUSION START DATE: NORMAL
CHEMOTHERAPY RECORDS: 2
CHEMOTHERAPY RECORDS: 5000
CHEMOTHERAPY RECORDS: NORMAL
CHEMOTHERAPY RX CANCER: NORMAL
DATE 1ST CHEMO CANCER: NORMAL
RAD ONC ARIA COURSE LAST TREATMENT DATE: NORMAL
RAD ONC ARIA COURSE TREATMENT ELAPSED DAYS: NORMAL
RAD ONC ARIA REFERENCE POINT DOSAGE GIVEN TO DATE: 10
RAD ONC ARIA REFERENCE POINT DOSAGE GIVEN TO DATE: 10.33
RAD ONC ARIA REFERENCE POINT ID: NORMAL
RAD ONC ARIA REFERENCE POINT ID: NORMAL
RAD ONC ARIA REFERENCE POINT SESSION DOSAGE GIVEN: 2
RAD ONC ARIA REFERENCE POINT SESSION DOSAGE GIVEN: 2.07

## 2022-12-19 PROCEDURE — 77427 RADIATION TX MANAGEMENT X5: CPT | Performed by: RADIOLOGY

## 2022-12-19 PROCEDURE — 77386 HCHG IMRT DELIVERY COMPLEX: CPT | Performed by: RADIOLOGY

## 2022-12-19 PROCEDURE — 77014 PR CT GUIDANCE PLACEMENT RAD THERAPY FIELDS: CPT | Mod: 26 | Performed by: RADIOLOGY

## 2022-12-20 ENCOUNTER — HOSPITAL ENCOUNTER (OUTPATIENT)
Dept: RADIATION ONCOLOGY | Facility: MEDICAL CENTER | Age: 42
End: 2022-12-20
Payer: COMMERCIAL

## 2022-12-20 ENCOUNTER — PATIENT OUTREACH (OUTPATIENT)
Dept: ONCOLOGY | Facility: MEDICAL CENTER | Age: 42
End: 2022-12-20
Payer: COMMERCIAL

## 2022-12-20 LAB
CHEMOTHERAPY INFUSION START DATE: NORMAL
CHEMOTHERAPY RECORDS: 2
CHEMOTHERAPY RECORDS: 5000
CHEMOTHERAPY RECORDS: NORMAL
CHEMOTHERAPY RX CANCER: NORMAL
DATE 1ST CHEMO CANCER: NORMAL
RAD ONC ARIA COURSE LAST TREATMENT DATE: NORMAL
RAD ONC ARIA COURSE TREATMENT ELAPSED DAYS: NORMAL
RAD ONC ARIA REFERENCE POINT DOSAGE GIVEN TO DATE: 12
RAD ONC ARIA REFERENCE POINT DOSAGE GIVEN TO DATE: 12.4
RAD ONC ARIA REFERENCE POINT ID: NORMAL
RAD ONC ARIA REFERENCE POINT ID: NORMAL
RAD ONC ARIA REFERENCE POINT SESSION DOSAGE GIVEN: 2
RAD ONC ARIA REFERENCE POINT SESSION DOSAGE GIVEN: 2.07

## 2022-12-20 PROCEDURE — 77014 PR CT GUIDANCE PLACEMENT RAD THERAPY FIELDS: CPT | Mod: 26 | Performed by: RADIOLOGY

## 2022-12-20 PROCEDURE — 77386 HCHG IMRT DELIVERY COMPLEX: CPT | Performed by: RADIOLOGY

## 2022-12-20 NOTE — PROGRESS NOTES
On December 20, 2022, Oncology Social Worker Mindy Chavez processed and submitted [facility] completed application on pt.'s behalf to [facility] for review.

## 2022-12-20 NOTE — PROGRESS NOTES
On December 20, 2022, Oncology Social Worker Mindy Chavez attempted telephone contact with pt.  OSW Scott left voicemail message for pt. in Chinese requesting pt. call back at earliest convenience.  OSW Scott left contact information in voicemail message.

## 2022-12-20 NOTE — PROGRESS NOTES
On December 20, 2022, Oncology Social Worker Mindy Chavez contacted pt. via telephone. CARLOS Chavez spoke to pt. in Turkmen throughout encounter.  CARLOS Chavez clarified and added information to pt.'s submitted [facility].      CARLOS Chavez informed pt. she had processed her application for transportation [place] and a gas card of $100 would be waiting for pt.  at Radiation Oncology .

## 2022-12-20 NOTE — PROGRESS NOTES
On December 20, 2022, Oncology Social Worker Mindy Chavez processed pt.'s [facility] [employer] [place] application.  Pt. will receive $100 in gas card to assist with transportation.   OSW Scott left envelope for pt. at Radiation Oncology for .

## 2022-12-20 NOTE — PROGRESS NOTES
On December 20, 2022, Oncology Social Worker Mindy Chavez attempted telephone contact with pt.  CARLOS Chavez informed pt. in voicemail message in Khmer she had been approved by [facility] for financial assistance in the amount of $650.  CARLOS Chavez informed pt. a check in the amount of $650 would be waiting for pt. to  today at her Radiation Oncology appointment.

## 2022-12-21 ENCOUNTER — HOSPITAL ENCOUNTER (OUTPATIENT)
Dept: RADIATION ONCOLOGY | Facility: MEDICAL CENTER | Age: 42
End: 2022-12-21
Payer: COMMERCIAL

## 2022-12-21 ENCOUNTER — HOSPITAL ENCOUNTER (OUTPATIENT)
Dept: RADIATION ONCOLOGY | Facility: MEDICAL CENTER | Age: 42
End: 2022-12-31
Attending: RADIOLOGY

## 2022-12-21 LAB
CHEMOTHERAPY INFUSION START DATE: NORMAL
CHEMOTHERAPY RECORDS: 2
CHEMOTHERAPY RECORDS: 5000
CHEMOTHERAPY RECORDS: NORMAL
CHEMOTHERAPY RX CANCER: NORMAL
DATE 1ST CHEMO CANCER: NORMAL
RAD ONC ARIA COURSE LAST TREATMENT DATE: NORMAL
RAD ONC ARIA COURSE TREATMENT ELAPSED DAYS: NORMAL
RAD ONC ARIA REFERENCE POINT DOSAGE GIVEN TO DATE: 14
RAD ONC ARIA REFERENCE POINT DOSAGE GIVEN TO DATE: 14.46
RAD ONC ARIA REFERENCE POINT ID: NORMAL
RAD ONC ARIA REFERENCE POINT ID: NORMAL
RAD ONC ARIA REFERENCE POINT SESSION DOSAGE GIVEN: 2
RAD ONC ARIA REFERENCE POINT SESSION DOSAGE GIVEN: 2.07

## 2022-12-21 PROCEDURE — 77386 HCHG IMRT DELIVERY COMPLEX: CPT | Performed by: RADIOLOGY

## 2022-12-21 PROCEDURE — 77014 PR CT GUIDANCE PLACEMENT RAD THERAPY FIELDS: CPT | Mod: 26 | Performed by: RADIOLOGY

## 2022-12-21 NOTE — ON TREATMENT VISIT
"ON TREATMENT  NOTE  RADIATION ONCOLOGY DEPARTMENT    Patient name:  Madison Allen    Primary Physician:  Pcp Pt States None MRN: [id number]  CSN: [id number]   Referring physician:  Artie Rojas M.*   : 1980, 42 y.o.     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 on 2022  Stage prefix: Initial diagnosis  Laterality: Right  Biopsy of metastatic site performed: No      TREATMENT SUMMARY:  Course First Treatment Date 2022  Course Last Treatment Date 2022  Radiation Treatments       Active   Plans   R Buttock   Most recent treatment: Dose planned: 200 cGy (fraction 7 of 25 on 2022)   Total: Dose planned: 5,000 cGy   Elapsed Days: 8 @            Historical   No historical radiation treatments to show.                 SUBJECTIVE:  Well appearing, mild pain in mass taking tramadol    VITAL SIGNS:      2022   Vitals   SYSTOLIC 108 102 122 112 106 94 100    98    104   DIASTOLIC 86 66 79 72 64 72 58    54    56   Pulse 78 75 71 75 86 73 78    63    69   Temperature 36.6 °C (97.8 °F) 36.4 °C (97.6 °F) 36.7 °C (98 °F) 36.9 °C (98.4 °F) 36.8 °C (98.2 °F) 36.4 °C (97.6 °F) 36.7 °C (98 °F)    36.4 °C (97.6 °F)    36.9 °C (98.4 °F)   Respiration     16 18 16    18    16   Weight  148 147.93 148 144.62 146.94    Height     1.651 m (5' 5\") 1.651 m (5' 5\")    BMI  24.63 kg/m2 24.62 kg/m2 24.63 kg/m2 24.07 kg/m2 24.45 kg/m2    Pulse Oximetry 100 % 99 % 98 % 95 % 97 % 97 % 95 %    97 %    96 %       Multiple values from one day are sorted in reverse-chronological order     KPS: 100, Fully active, able to carry on all pre-disease performed without restriction (ECOG equivalent 0)  Encounter Vitals  Temperature: (P) 36.6 °C (97.8 °F)  Blood Pressure: (P) " 108/86  Pulse: (P) 78  Pulse Oximetry: (P) 100 %      12/14/2022 12/2/2022 11/16/2022 11/9/2022   Pain Assessment   Pain Score NO PAIN 7=MODERATE-S NO PAIN NO PAIN       Multiple values from one day are sorted in reverse-chronological order          PHYSICAL EXAM:  Physical Exam  Musculoskeletal:         General: Tenderness present.   Neurological:      General: No focal deficit present.            12/14/2022   Toxicity Assessment   Toxicity Assessment Skin   Fatigue (lethargy, malaise, asthenia) None   Radiation Dermatitis None   Photosensitivity None   Dry Skin Normal   Alopecia Normal   Erythema Multiforme (e.g., Brice-Kal syndrome, toxic epidermal necrolysis) Absent   Nail Changes Normal   Wound (Non-Infectious) None   Wound (Infectious) None   RT - Pain due to RT None   Tumor Pain (onset or exacerbation of tumor pain due to treatment) None   Skin - Late RT No change from baseline       Multiple values from one day are sorted in reverse-chronological order       CURRENT MEDICATIONS:    Current Outpatient Medications:     etonogestrel (NEXPLANON) 68 MG Implant implant, Inject 1 Each under the skin one time., Disp: , Rfl:     LABORATORY DATA:   Lab Results   Component Value Date/Time    SODIUM 137 11/02/2022 01:53 AM    POTASSIUM 3.9 11/02/2022 01:53 AM    CHLORIDE 105 11/02/2022 01:53 AM    CO2 21 11/02/2022 01:53 AM    GLUCOSE 96 11/02/2022 01:53 AM    BUN 17 11/02/2022 01:53 AM    CREATININE 0.64 11/02/2022 01:53 AM    CREATININE 0.8 09/06/2007 10:25 AM       Lab Results   Component Value Date/Time    WBC 7.7 11/02/2022 01:53 AM    RBC 4.68 11/02/2022 01:53 AM    HEMOGLOBIN 12.5 11/02/2022 01:53 AM    HEMATOCRIT 39.3 11/02/2022 01:53 AM    MCV 84.0 11/02/2022 01:53 AM    MCH 26.7 (L) 11/02/2022 01:53 AM    MCHC 31.8 (L) 11/02/2022 01:53 AM    PLATELETCT 191 11/02/2022 01:53 AM         RADIOLOGY DATA:  CT-CHEST,ABDOMEN,PELVIS W/O    Result Date: 11/2/2022  No noncontrast evidence of metastasis Right  gluteus denise centered mass is unchanged Cholecystectomy Calcification in the right lobe liver is indeterminate but may represent remote granulomatous disease    CT-PELVIS WITH    Result Date: 11/1/2022  Right gluteus denise 15 cm soft tissue mass with mostly fluid and fatty components is highly worrisome for soft tissue sarcoma, liposarcoma. Myxoma should be considered especially given some fat along the margins. MRI thigh with and without contrast may  prove helpful to further evaluate the soft tissue. The mass is stable to slightly enlarged from June    MR-FEMUR-WITH & W/O RIGHT    Result Date: 11/2/2022  Large right inferior buttock/upper posterior thigh mass which involves the right gluteus denise muscle as well as contacts the hamstrings muscles. This lesion measures 16 x 10 x 7.3 cm in size and demonstrates some fatty elements with heterogeneous enhancement. Less likely process would be that of a liposarcoma to include myxoid variant liposarcoma.    IR-BIOPSY-GENERIC    Result Date: 11/12/2022  Successful core biopsy and aspiration of right gluteal lesion.      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 on 12/2/2022      PLAN:  No change in treatment plan    Disposition:  Treatment plan and imaging reviewed. Questions answered. Continue therapy outlined.     Charles Tian M.D.    No orders of the defined types were placed in this encounter.

## 2022-12-22 ENCOUNTER — HOSPITAL ENCOUNTER (OUTPATIENT)
Dept: RADIATION ONCOLOGY | Facility: MEDICAL CENTER | Age: 42
End: 2022-12-22
Payer: COMMERCIAL

## 2022-12-22 LAB
CHEMOTHERAPY INFUSION START DATE: NORMAL
CHEMOTHERAPY RECORDS: 2
CHEMOTHERAPY RECORDS: 5000
CHEMOTHERAPY RECORDS: NORMAL
CHEMOTHERAPY RX CANCER: NORMAL
DATE 1ST CHEMO CANCER: NORMAL
RAD ONC ARIA COURSE LAST TREATMENT DATE: NORMAL
RAD ONC ARIA COURSE TREATMENT ELAPSED DAYS: NORMAL
RAD ONC ARIA REFERENCE POINT DOSAGE GIVEN TO DATE: 16
RAD ONC ARIA REFERENCE POINT DOSAGE GIVEN TO DATE: 16.53
RAD ONC ARIA REFERENCE POINT ID: NORMAL
RAD ONC ARIA REFERENCE POINT ID: NORMAL
RAD ONC ARIA REFERENCE POINT SESSION DOSAGE GIVEN: 2
RAD ONC ARIA REFERENCE POINT SESSION DOSAGE GIVEN: 2.07

## 2022-12-22 PROCEDURE — 77336 RADIATION PHYSICS CONSULT: CPT | Performed by: RADIOLOGY

## 2022-12-22 PROCEDURE — 77014 PR CT GUIDANCE PLACEMENT RAD THERAPY FIELDS: CPT | Mod: 26 | Performed by: RADIOLOGY

## 2022-12-22 PROCEDURE — 77386 HCHG IMRT DELIVERY COMPLEX: CPT | Performed by: RADIOLOGY

## 2022-12-27 ENCOUNTER — HOSPITAL ENCOUNTER (OUTPATIENT)
Dept: RADIATION ONCOLOGY | Facility: MEDICAL CENTER | Age: 42
End: 2022-12-27
Payer: COMMERCIAL

## 2022-12-27 LAB
CHEMOTHERAPY INFUSION START DATE: NORMAL
CHEMOTHERAPY RECORDS: 2
CHEMOTHERAPY RECORDS: 5000
CHEMOTHERAPY RECORDS: NORMAL
CHEMOTHERAPY RX CANCER: NORMAL
DATE 1ST CHEMO CANCER: NORMAL
RAD ONC ARIA COURSE LAST TREATMENT DATE: NORMAL
RAD ONC ARIA COURSE TREATMENT ELAPSED DAYS: NORMAL
RAD ONC ARIA REFERENCE POINT DOSAGE GIVEN TO DATE: 18
RAD ONC ARIA REFERENCE POINT DOSAGE GIVEN TO DATE: 18.59
RAD ONC ARIA REFERENCE POINT ID: NORMAL
RAD ONC ARIA REFERENCE POINT ID: NORMAL
RAD ONC ARIA REFERENCE POINT SESSION DOSAGE GIVEN: 2
RAD ONC ARIA REFERENCE POINT SESSION DOSAGE GIVEN: 2.07

## 2022-12-27 PROCEDURE — 77386 HCHG IMRT DELIVERY COMPLEX: CPT | Performed by: RADIOLOGY

## 2022-12-27 PROCEDURE — 77014 PR CT GUIDANCE PLACEMENT RAD THERAPY FIELDS: CPT | Mod: 26 | Performed by: RADIOLOGY

## 2022-12-28 ENCOUNTER — HOSPITAL ENCOUNTER (OUTPATIENT)
Dept: RADIATION ONCOLOGY | Facility: MEDICAL CENTER | Age: 42
End: 2022-12-31
Attending: RADIOLOGY

## 2022-12-28 ENCOUNTER — HOSPITAL ENCOUNTER (OUTPATIENT)
Dept: RADIATION ONCOLOGY | Facility: MEDICAL CENTER | Age: 42
End: 2022-12-28
Payer: COMMERCIAL

## 2022-12-28 VITALS
HEART RATE: 81 BPM | WEIGHT: 148 LBS | BODY MASS INDEX: 24.63 KG/M2 | SYSTOLIC BLOOD PRESSURE: 116 MMHG | DIASTOLIC BLOOD PRESSURE: 77 MMHG | OXYGEN SATURATION: 97 % | TEMPERATURE: 97.6 F

## 2022-12-28 LAB
CHEMOTHERAPY INFUSION START DATE: NORMAL
CHEMOTHERAPY RECORDS: 2
CHEMOTHERAPY RECORDS: 5000
CHEMOTHERAPY RECORDS: NORMAL
CHEMOTHERAPY RX CANCER: NORMAL
DATE 1ST CHEMO CANCER: NORMAL
RAD ONC ARIA COURSE LAST TREATMENT DATE: NORMAL
RAD ONC ARIA COURSE TREATMENT ELAPSED DAYS: NORMAL
RAD ONC ARIA REFERENCE POINT DOSAGE GIVEN TO DATE: 20
RAD ONC ARIA REFERENCE POINT DOSAGE GIVEN TO DATE: 20.66
RAD ONC ARIA REFERENCE POINT ID: NORMAL
RAD ONC ARIA REFERENCE POINT ID: NORMAL
RAD ONC ARIA REFERENCE POINT SESSION DOSAGE GIVEN: 2
RAD ONC ARIA REFERENCE POINT SESSION DOSAGE GIVEN: 2.07

## 2022-12-28 PROCEDURE — 77014 PR CT GUIDANCE PLACEMENT RAD THERAPY FIELDS: CPT | Mod: 26 | Performed by: RADIOLOGY

## 2022-12-28 PROCEDURE — 77427 RADIATION TX MANAGEMENT X5: CPT | Performed by: RADIOLOGY

## 2022-12-28 PROCEDURE — 77386 HCHG IMRT DELIVERY COMPLEX: CPT | Performed by: RADIOLOGY

## 2022-12-28 ASSESSMENT — FIBROSIS 4 INDEX: FIB4 SCORE: 1.17

## 2022-12-28 ASSESSMENT — PAIN SCALES - GENERAL: PAINLEVEL: NO PAIN

## 2022-12-28 NOTE — ON TREATMENT VISIT
"  ON TREATMENT  NOTE  RADIATION ONCOLOGY DEPARTMENT    Patient name:  Madison Allen    Primary Physician:  Pcp Pt States None MRN: [id number]  CSN: [id number]   Referring physician:  Artie Rojas M.*   : 1980, 42 y.o.     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 on 2022  Stage prefix: Initial diagnosis  Laterality: Right  Biopsy of metastatic site performed: No      TREATMENT SUMMARY:  Course First Treatment Date 2022  Course Last Treatment Date 2022  Radiation Treatments       Active   Plans   R Buttock   Most recent treatment: Dose planned: 200 cGy (fraction 10 of 25 on 2022)   Total: Dose planned: 5,000 cGy   Elapsed Days: 15 @            Historical   No historical radiation treatments to show.                 SUBJECTIVE:  No complaints.    VITAL SIGNS:      2022   Vitals   SYSTOLIC 116 108 102 122 112 106 94   DIASTOLIC 77 86 66 79 72 64 72   Pulse 81 78 75 71 75 86 73   Temperature 36.4 °C (97.6 °F) 36.6 °C (97.8 °F) 36.4 °C (97.6 °F) 36.7 °C (98 °F) 36.9 °C (98.4 °F) 36.8 °C (98.2 °F) 36.4 °C (97.6 °F)   Respiration      16 18   Weight 148  148 147.93 148 144.62 146.94   Height      1.651 m (5' 5\") 1.651 m (5' 5\")   BMI 24.63 kg/m2  24.63 kg/m2 24.62 kg/m2 24.63 kg/m2 24.07 kg/m2 24.45 kg/m2   Pulse Oximetry 97 % 100 % 99 % 98 % 95 % 97 % 97 %       Multiple values from one day are sorted in reverse-chronological order     KPS: 80, Normal activity with effort; some signs or symptoms of disease (ECOG equivalent 1)  Encounter Vitals  Temperature: 36.4 °C (97.6 °F)  Temp src: Temporal  Blood Pressure: 116/77  Pulse: 81  Pulse Oximetry: 97 %  Weight: 67.1 kg (148 lb)  Pain Score: No pain      2022 " 11/16/2022 11/9/2022   Pain Assessment   Pain Score NO PAIN NO PAIN 7=MODERATE-S NO PAIN NO PAIN       Multiple values from one day are sorted in reverse-chronological order          PHYSICAL EXAM:  Physical Exam  Vitals and nursing note reviewed.   Constitutional:       Appearance: She is well-developed.   HENT:      Head: Normocephalic.   Skin:     General: Skin is warm and dry.      Findings: No erythema.   Neurological:      Mental Status: She is alert and oriented to person, place, and time.   Psychiatric:         Behavior: Behavior normal.         Thought Content: Thought content normal.         Judgment: Judgment normal.            12/28/2022 12/14/2022   Toxicity Assessment   Toxicity Assessment Skin Skin   Fatigue (lethargy, malaise, asthenia) None None   Radiation Dermatitis None None   Photosensitivity None None   Dry Skin Normal Normal   Alopecia Normal Normal   Erythema Multiforme (e.g., Brice-Kal syndrome, toxic epidermal necrolysis) Absent Absent   Nail Changes Normal Normal   Wound (Non-Infectious) None None   Wound (Infectious) None None   RT - Pain due to RT None None   Tumor Pain (onset or exacerbation of tumor pain due to treatment) None None   Skin - Late RT No change from baseline No change from baseline       Multiple values from one day are sorted in reverse-chronological order       CURRENT MEDICATIONS:    Current Outpatient Medications:     etonogestrel (NEXPLANON) 68 MG Implant implant, Inject 1 Each under the skin one time., Disp: , Rfl:     LABORATORY DATA:   Lab Results   Component Value Date/Time    SODIUM 137 11/02/2022 01:53 AM    POTASSIUM 3.9 11/02/2022 01:53 AM    CHLORIDE 105 11/02/2022 01:53 AM    CO2 21 11/02/2022 01:53 AM    GLUCOSE 96 11/02/2022 01:53 AM    BUN 17 11/02/2022 01:53 AM    CREATININE 0.64 11/02/2022 01:53 AM    CREATININE 0.8 09/06/2007 10:25 AM       Lab Results   Component Value Date/Time    WBC 7.7 11/02/2022 01:53 AM    RBC 4.68 11/02/2022 01:53 AM     HEMOGLOBIN 12.5 11/02/2022 01:53 AM    HEMATOCRIT 39.3 11/02/2022 01:53 AM    MCV 84.0 11/02/2022 01:53 AM    MCH 26.7 (L) 11/02/2022 01:53 AM    MCHC 31.8 (L) 11/02/2022 01:53 AM    PLATELETCT 191 11/02/2022 01:53 AM         RADIOLOGY DATA:  CT-CHEST,ABDOMEN,PELVIS W/O    Result Date: 11/2/2022  No noncontrast evidence of metastasis Right gluteus denise centered mass is unchanged Cholecystectomy Calcification in the right lobe liver is indeterminate but may represent remote granulomatous disease    CT-PELVIS WITH    Result Date: 11/1/2022  Right gluteus denise 15 cm soft tissue mass with mostly fluid and fatty components is highly worrisome for soft tissue sarcoma, liposarcoma. Myxoma should be considered especially given some fat along the margins. MRI thigh with and without contrast may  prove helpful to further evaluate the soft tissue. The mass is stable to slightly enlarged from June    MR-FEMUR-WITH & W/O RIGHT    Result Date: 11/2/2022  Large right inferior buttock/upper posterior thigh mass which involves the right gluteus denise muscle as well as contacts the hamstrings muscles. This lesion measures 16 x 10 x 7.3 cm in size and demonstrates some fatty elements with heterogeneous enhancement. Less likely process would be that of a liposarcoma to include myxoid variant liposarcoma.    IR-BIOPSY-GENERIC    Result Date: 11/12/2022  Successful core biopsy and aspiration of right gluteal lesion.      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 on 12/2/2022      PLAN:  No change in treatment plan    Disposition:  Treatment plan and imaging reviewed. Questions answered. Continue therapy outlined.     Kamron SO M.D.    No orders of the defined types were placed in this encounter.

## 2022-12-29 ENCOUNTER — HOSPITAL ENCOUNTER (OUTPATIENT)
Dept: RADIATION ONCOLOGY | Facility: MEDICAL CENTER | Age: 42
End: 2022-12-29
Payer: COMMERCIAL

## 2022-12-29 LAB
CHEMOTHERAPY INFUSION START DATE: NORMAL
CHEMOTHERAPY RECORDS: 2
CHEMOTHERAPY RECORDS: 5000
CHEMOTHERAPY RECORDS: NORMAL
CHEMOTHERAPY RX CANCER: NORMAL
DATE 1ST CHEMO CANCER: NORMAL
RAD ONC ARIA COURSE LAST TREATMENT DATE: NORMAL
RAD ONC ARIA COURSE TREATMENT ELAPSED DAYS: NORMAL
RAD ONC ARIA REFERENCE POINT DOSAGE GIVEN TO DATE: 22
RAD ONC ARIA REFERENCE POINT DOSAGE GIVEN TO DATE: 22.73
RAD ONC ARIA REFERENCE POINT ID: NORMAL
RAD ONC ARIA REFERENCE POINT ID: NORMAL
RAD ONC ARIA REFERENCE POINT SESSION DOSAGE GIVEN: 2
RAD ONC ARIA REFERENCE POINT SESSION DOSAGE GIVEN: 2.07

## 2022-12-29 PROCEDURE — 77386 HCHG IMRT DELIVERY COMPLEX: CPT | Performed by: RADIOLOGY

## 2022-12-29 PROCEDURE — 77014 PR CT GUIDANCE PLACEMENT RAD THERAPY FIELDS: CPT | Mod: 26 | Performed by: RADIOLOGY

## 2022-12-30 ENCOUNTER — HOSPITAL ENCOUNTER (OUTPATIENT)
Dept: RADIATION ONCOLOGY | Facility: MEDICAL CENTER | Age: 42
End: 2022-12-30
Payer: COMMERCIAL

## 2022-12-30 LAB
CHEMOTHERAPY INFUSION START DATE: NORMAL
CHEMOTHERAPY RECORDS: 2
CHEMOTHERAPY RECORDS: 5000
CHEMOTHERAPY RECORDS: NORMAL
CHEMOTHERAPY RX CANCER: NORMAL
DATE 1ST CHEMO CANCER: NORMAL
RAD ONC ARIA COURSE LAST TREATMENT DATE: NORMAL
RAD ONC ARIA COURSE TREATMENT ELAPSED DAYS: NORMAL
RAD ONC ARIA REFERENCE POINT DOSAGE GIVEN TO DATE: 24
RAD ONC ARIA REFERENCE POINT DOSAGE GIVEN TO DATE: 24.79
RAD ONC ARIA REFERENCE POINT ID: NORMAL
RAD ONC ARIA REFERENCE POINT ID: NORMAL
RAD ONC ARIA REFERENCE POINT SESSION DOSAGE GIVEN: 2
RAD ONC ARIA REFERENCE POINT SESSION DOSAGE GIVEN: 2.07

## 2022-12-30 PROCEDURE — 77014 PR CT GUIDANCE PLACEMENT RAD THERAPY FIELDS: CPT | Mod: 26 | Performed by: RADIOLOGY

## 2022-12-30 PROCEDURE — 77386 HCHG IMRT DELIVERY COMPLEX: CPT | Performed by: RADIOLOGY

## 2023-01-03 ENCOUNTER — HOSPITAL ENCOUNTER (OUTPATIENT)
Dept: RADIATION ONCOLOGY | Facility: MEDICAL CENTER | Age: 43
End: 2023-01-31
Attending: RADIOLOGY

## 2023-01-03 ENCOUNTER — HOSPITAL ENCOUNTER (OUTPATIENT)
Dept: RADIATION ONCOLOGY | Facility: MEDICAL CENTER | Age: 43
End: 2023-01-03

## 2023-01-03 LAB
CHEMOTHERAPY INFUSION START DATE: NORMAL
CHEMOTHERAPY RECORDS: 2
CHEMOTHERAPY RECORDS: 5000
CHEMOTHERAPY RECORDS: NORMAL
CHEMOTHERAPY RX CANCER: NORMAL
DATE 1ST CHEMO CANCER: NORMAL
RAD ONC ARIA COURSE LAST TREATMENT DATE: NORMAL
RAD ONC ARIA COURSE TREATMENT ELAPSED DAYS: NORMAL
RAD ONC ARIA REFERENCE POINT DOSAGE GIVEN TO DATE: 26
RAD ONC ARIA REFERENCE POINT DOSAGE GIVEN TO DATE: 26.86
RAD ONC ARIA REFERENCE POINT ID: NORMAL
RAD ONC ARIA REFERENCE POINT ID: NORMAL
RAD ONC ARIA REFERENCE POINT SESSION DOSAGE GIVEN: 2
RAD ONC ARIA REFERENCE POINT SESSION DOSAGE GIVEN: 2.07

## 2023-01-03 PROCEDURE — 77336 RADIATION PHYSICS CONSULT: CPT | Performed by: RADIOLOGY

## 2023-01-03 PROCEDURE — 77386 HCHG IMRT DELIVERY COMPLEX: CPT | Performed by: RADIOLOGY

## 2023-01-03 PROCEDURE — 77014 PR CT GUIDANCE PLACEMENT RAD THERAPY FIELDS: CPT | Mod: 26 | Performed by: RADIOLOGY

## 2023-01-04 ENCOUNTER — HOSPITAL ENCOUNTER (OUTPATIENT)
Dept: RADIATION ONCOLOGY | Facility: MEDICAL CENTER | Age: 43
End: 2023-01-31
Attending: RADIOLOGY

## 2023-01-04 ENCOUNTER — HOSPITAL ENCOUNTER (OUTPATIENT)
Dept: RADIATION ONCOLOGY | Facility: MEDICAL CENTER | Age: 43
End: 2023-01-04
Payer: COMMERCIAL

## 2023-01-04 VITALS
SYSTOLIC BLOOD PRESSURE: 118 MMHG | HEART RATE: 66 BPM | BODY MASS INDEX: 24.81 KG/M2 | DIASTOLIC BLOOD PRESSURE: 78 MMHG | WEIGHT: 149.1 LBS | TEMPERATURE: 98.2 F | OXYGEN SATURATION: 99 %

## 2023-01-04 LAB
CHEMOTHERAPY INFUSION START DATE: NORMAL
CHEMOTHERAPY RECORDS: 2
CHEMOTHERAPY RECORDS: 5000
CHEMOTHERAPY RECORDS: NORMAL
CHEMOTHERAPY RX CANCER: NORMAL
DATE 1ST CHEMO CANCER: NORMAL
RAD ONC ARIA COURSE LAST TREATMENT DATE: NORMAL
RAD ONC ARIA COURSE TREATMENT ELAPSED DAYS: NORMAL
RAD ONC ARIA REFERENCE POINT DOSAGE GIVEN TO DATE: 28
RAD ONC ARIA REFERENCE POINT DOSAGE GIVEN TO DATE: 28.92
RAD ONC ARIA REFERENCE POINT ID: NORMAL
RAD ONC ARIA REFERENCE POINT ID: NORMAL
RAD ONC ARIA REFERENCE POINT SESSION DOSAGE GIVEN: 2
RAD ONC ARIA REFERENCE POINT SESSION DOSAGE GIVEN: 2.07

## 2023-01-04 PROCEDURE — 77014 PR CT GUIDANCE PLACEMENT RAD THERAPY FIELDS: CPT | Mod: 26 | Performed by: RADIOLOGY

## 2023-01-04 PROCEDURE — 77386 HCHG IMRT DELIVERY COMPLEX: CPT | Performed by: RADIOLOGY

## 2023-01-04 ASSESSMENT — FIBROSIS 4 INDEX: FIB4 SCORE: 1.17

## 2023-01-04 ASSESSMENT — PAIN SCALES - GENERAL: PAINLEVEL: NO PAIN

## 2023-01-04 NOTE — ON TREATMENT VISIT
"ON TREATMENT  NOTE  RADIATION ONCOLOGY DEPARTMENT    Patient name:  Madison Allen    Primary Physician:  Pcp Pt States None MRN: [id number]  CSN: [id number]   Referring physician:  No ref. provider found   : 1980, 42 y.o.     ENCOUNTER DATE:  2023    DIAGNOSIS:  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 on 2022  Stage prefix: Initial diagnosis  Laterality: Right  Biopsy of metastatic site performed: No      TREATMENT SUMMARY:  Course First Treatment Date 2022  Course Last Treatment Date 2023  Radiation Treatments       Active   Plans   R Buttock   Most recent treatment: Dose planned: 200 cGy (fraction 14 of 25 on 2023)   Total: Dose planned: 5,000 cGy   Elapsed Days:  @ [id number]           Historical   No historical radiation treatments to show.               SUBJECTIVE:  Pain improving    VITAL SIGNS:      2022   Vitals   SYSTOLIC 118 116 108 102 122 112 106   DIASTOLIC 78 77 86 66 79 72 64   Pulse 66 81 78 75 71 75 86   Temperature 36.8 °C (98.2 °F) 36.4 °C (97.6 °F) 36.6 °C (97.8 °F) 36.4 °C (97.6 °F) 36.7 °C (98 °F) 36.9 °C (98.4 °F) 36.8 °C (98.2 °F)   Respiration       16   Weight 149.1 148  148 147.93 148 144.62   Height       1.651 m (5' 5\")   BMI 24.81 kg/m2 24.63 kg/m2  24.63 kg/m2 24.62 kg/m2 24.63 kg/m2 24.07 kg/m2   Pulse Oximetry 99 % 97 % 100 % 99 % 98 % 95 % 97 %       Multiple values from one day are sorted in reverse-chronological order     KPS: 100, Fully active, able to carry on all pre-disease performed without restriction (ECOG equivalent 0)  Encounter Vitals  Temperature: 36.8 °C (98.2 °F)  Blood Pressure: 118/78  Pulse: 66  Pulse Oximetry: 99 %  Weight: 67.6 kg (149 lb 1.6 oz)  Pain Score: No pain      2022 "   Pain Assessment   Pain Score NO PAIN NO PAIN NO PAIN 7=MODERATE-S NO PAIN NO PAIN       Multiple values from one day are sorted in reverse-chronological order          PHYSICAL EXAM:  Physical Exam         1/4/2023 12/28/2022 12/14/2022   Toxicity Assessment   Toxicity Assessment Skin Skin Skin   Fatigue (lethargy, malaise, asthenia) None None None   Radiation Dermatitis None None None   Photosensitivity None None None   Dry Skin Normal Normal Normal   Alopecia Normal Normal Normal   Erythema Multiforme (e.g., Brice-Kal syndrome, toxic epidermal necrolysis) Absent Absent Absent   Nail Changes Normal Normal Normal   Wound (Non-Infectious) None None None   Wound (Infectious) None None None   RT - Pain due to RT None None None   Tumor Pain (onset or exacerbation of tumor pain due to treatment) None None None   Skin - Late RT No change from baseline No change from baseline No change from baseline       Multiple values from one day are sorted in reverse-chronological order       CURRENT MEDICATIONS:    Current Outpatient Medications:     etonogestrel (NEXPLANON) 68 MG Implant implant, Inject 1 Each under the skin one time., Disp: , Rfl:     LABORATORY DATA:   Lab Results   Component Value Date/Time    SODIUM 137 11/02/2022 01:53 AM    POTASSIUM 3.9 11/02/2022 01:53 AM    CHLORIDE 105 11/02/2022 01:53 AM    CO2 21 11/02/2022 01:53 AM    GLUCOSE 96 11/02/2022 01:53 AM    BUN 17 11/02/2022 01:53 AM    CREATININE 0.64 11/02/2022 01:53 AM    CREATININE 0.8 09/06/2007 10:25 AM       Lab Results   Component Value Date/Time    WBC 7.7 11/02/2022 01:53 AM    RBC 4.68 11/02/2022 01:53 AM    HEMOGLOBIN 12.5 11/02/2022 01:53 AM    HEMATOCRIT 39.3 11/02/2022 01:53 AM    MCV 84.0 11/02/2022 01:53 AM    MCH 26.7 (L) 11/02/2022 01:53 AM    MCHC 31.8 (L) 11/02/2022 01:53 AM    PLATELETCT 191 11/02/2022 01:53 AM         RADIOLOGY DATA:  IR-BIOPSY-GENERIC    Result Date: 11/12/2022  Successful core biopsy and aspiration of right  gluteal lesion.      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 on 12/2/2022      PLAN:  No change in treatment plan    Disposition:  Treatment plan and imaging reviewed. Questions answered. Continue therapy outlined.     Charles Tian M.D.    No orders of the defined types were placed in this encounter.

## 2023-01-05 ENCOUNTER — HOSPITAL ENCOUNTER (OUTPATIENT)
Dept: RADIATION ONCOLOGY | Facility: MEDICAL CENTER | Age: 43
End: 2023-01-05
Payer: COMMERCIAL

## 2023-01-05 LAB
CHEMOTHERAPY INFUSION START DATE: NORMAL
CHEMOTHERAPY RECORDS: 2
CHEMOTHERAPY RECORDS: 5000
CHEMOTHERAPY RECORDS: NORMAL
CHEMOTHERAPY RX CANCER: NORMAL
DATE 1ST CHEMO CANCER: NORMAL
RAD ONC ARIA COURSE LAST TREATMENT DATE: NORMAL
RAD ONC ARIA COURSE TREATMENT ELAPSED DAYS: NORMAL
RAD ONC ARIA REFERENCE POINT DOSAGE GIVEN TO DATE: 30
RAD ONC ARIA REFERENCE POINT DOSAGE GIVEN TO DATE: 30.99
RAD ONC ARIA REFERENCE POINT ID: NORMAL
RAD ONC ARIA REFERENCE POINT ID: NORMAL
RAD ONC ARIA REFERENCE POINT SESSION DOSAGE GIVEN: 2
RAD ONC ARIA REFERENCE POINT SESSION DOSAGE GIVEN: 2.07

## 2023-01-05 PROCEDURE — 77386 HCHG IMRT DELIVERY COMPLEX: CPT | Performed by: RADIOLOGY

## 2023-01-05 PROCEDURE — 77014 PR CT GUIDANCE PLACEMENT RAD THERAPY FIELDS: CPT | Mod: 26 | Performed by: RADIOLOGY

## 2023-01-05 PROCEDURE — 77427 RADIATION TX MANAGEMENT X5: CPT | Performed by: RADIOLOGY

## 2023-01-06 ENCOUNTER — HOSPITAL ENCOUNTER (OUTPATIENT)
Dept: RADIATION ONCOLOGY | Facility: MEDICAL CENTER | Age: 43
End: 2023-01-06
Payer: COMMERCIAL

## 2023-01-06 LAB
CHEMOTHERAPY INFUSION START DATE: NORMAL
CHEMOTHERAPY RECORDS: 2
CHEMOTHERAPY RECORDS: 5000
CHEMOTHERAPY RECORDS: NORMAL
CHEMOTHERAPY RX CANCER: NORMAL
DATE 1ST CHEMO CANCER: NORMAL
RAD ONC ARIA COURSE LAST TREATMENT DATE: NORMAL
RAD ONC ARIA COURSE TREATMENT ELAPSED DAYS: NORMAL
RAD ONC ARIA REFERENCE POINT DOSAGE GIVEN TO DATE: 32
RAD ONC ARIA REFERENCE POINT DOSAGE GIVEN TO DATE: 33.05
RAD ONC ARIA REFERENCE POINT ID: NORMAL
RAD ONC ARIA REFERENCE POINT ID: NORMAL
RAD ONC ARIA REFERENCE POINT SESSION DOSAGE GIVEN: 2
RAD ONC ARIA REFERENCE POINT SESSION DOSAGE GIVEN: 2.07

## 2023-01-06 PROCEDURE — 77014 PR CT GUIDANCE PLACEMENT RAD THERAPY FIELDS: CPT | Mod: 26 | Performed by: RADIOLOGY

## 2023-01-06 PROCEDURE — 77386 HCHG IMRT DELIVERY COMPLEX: CPT | Performed by: RADIOLOGY

## 2023-01-09 ENCOUNTER — HOSPITAL ENCOUNTER (OUTPATIENT)
Dept: RADIATION ONCOLOGY | Facility: MEDICAL CENTER | Age: 43
End: 2023-01-09
Payer: COMMERCIAL

## 2023-01-09 LAB
CHEMOTHERAPY INFUSION START DATE: NORMAL
CHEMOTHERAPY RECORDS: 2
CHEMOTHERAPY RECORDS: 5000
CHEMOTHERAPY RECORDS: NORMAL
CHEMOTHERAPY RX CANCER: NORMAL
DATE 1ST CHEMO CANCER: NORMAL
RAD ONC ARIA COURSE LAST TREATMENT DATE: NORMAL
RAD ONC ARIA COURSE TREATMENT ELAPSED DAYS: NORMAL
RAD ONC ARIA REFERENCE POINT DOSAGE GIVEN TO DATE: 34
RAD ONC ARIA REFERENCE POINT DOSAGE GIVEN TO DATE: 35.12
RAD ONC ARIA REFERENCE POINT ID: NORMAL
RAD ONC ARIA REFERENCE POINT ID: NORMAL
RAD ONC ARIA REFERENCE POINT SESSION DOSAGE GIVEN: 2
RAD ONC ARIA REFERENCE POINT SESSION DOSAGE GIVEN: 2.07

## 2023-01-09 PROCEDURE — 77014 PR CT GUIDANCE PLACEMENT RAD THERAPY FIELDS: CPT | Mod: 26 | Performed by: RADIOLOGY

## 2023-01-09 PROCEDURE — 77386 HCHG IMRT DELIVERY COMPLEX: CPT | Performed by: RADIOLOGY

## 2023-01-10 ENCOUNTER — HOSPITAL ENCOUNTER (OUTPATIENT)
Dept: RADIATION ONCOLOGY | Facility: MEDICAL CENTER | Age: 43
End: 2023-01-10
Payer: COMMERCIAL

## 2023-01-10 LAB
CHEMOTHERAPY INFUSION START DATE: NORMAL
CHEMOTHERAPY RECORDS: 2
CHEMOTHERAPY RECORDS: 5000
CHEMOTHERAPY RECORDS: NORMAL
CHEMOTHERAPY RX CANCER: NORMAL
DATE 1ST CHEMO CANCER: NORMAL
RAD ONC ARIA COURSE LAST TREATMENT DATE: NORMAL
RAD ONC ARIA COURSE TREATMENT ELAPSED DAYS: NORMAL
RAD ONC ARIA REFERENCE POINT DOSAGE GIVEN TO DATE: 36
RAD ONC ARIA REFERENCE POINT DOSAGE GIVEN TO DATE: 37.19
RAD ONC ARIA REFERENCE POINT ID: NORMAL
RAD ONC ARIA REFERENCE POINT ID: NORMAL
RAD ONC ARIA REFERENCE POINT SESSION DOSAGE GIVEN: 2
RAD ONC ARIA REFERENCE POINT SESSION DOSAGE GIVEN: 2.07

## 2023-01-10 PROCEDURE — 77336 RADIATION PHYSICS CONSULT: CPT | Performed by: RADIOLOGY

## 2023-01-10 PROCEDURE — 77014 PR CT GUIDANCE PLACEMENT RAD THERAPY FIELDS: CPT | Mod: 26 | Performed by: RADIOLOGY

## 2023-01-10 PROCEDURE — 77386 HCHG IMRT DELIVERY COMPLEX: CPT | Performed by: RADIOLOGY

## 2023-01-11 ENCOUNTER — HOSPITAL ENCOUNTER (OUTPATIENT)
Dept: RADIATION ONCOLOGY | Facility: MEDICAL CENTER | Age: 43
End: 2023-01-31
Attending: RADIOLOGY

## 2023-01-11 ENCOUNTER — HOSPITAL ENCOUNTER (OUTPATIENT)
Dept: RADIATION ONCOLOGY | Facility: MEDICAL CENTER | Age: 43
End: 2023-01-11
Payer: COMMERCIAL

## 2023-01-11 VITALS
HEART RATE: 80 BPM | DIASTOLIC BLOOD PRESSURE: 79 MMHG | TEMPERATURE: 97.6 F | SYSTOLIC BLOOD PRESSURE: 123 MMHG | OXYGEN SATURATION: 100 % | BODY MASS INDEX: 24.6 KG/M2 | WEIGHT: 147.8 LBS

## 2023-01-11 DIAGNOSIS — C49.9 MYXOID LIPOSARCOMA (HCC): ICD-10-CM

## 2023-01-11 LAB
CHEMOTHERAPY INFUSION START DATE: NORMAL
CHEMOTHERAPY RECORDS: 2
CHEMOTHERAPY RECORDS: 5000
CHEMOTHERAPY RECORDS: NORMAL
CHEMOTHERAPY RX CANCER: NORMAL
DATE 1ST CHEMO CANCER: NORMAL
RAD ONC ARIA COURSE LAST TREATMENT DATE: NORMAL
RAD ONC ARIA COURSE TREATMENT ELAPSED DAYS: NORMAL
RAD ONC ARIA REFERENCE POINT DOSAGE GIVEN TO DATE: 38
RAD ONC ARIA REFERENCE POINT DOSAGE GIVEN TO DATE: 39.25
RAD ONC ARIA REFERENCE POINT ID: NORMAL
RAD ONC ARIA REFERENCE POINT ID: NORMAL
RAD ONC ARIA REFERENCE POINT SESSION DOSAGE GIVEN: 2
RAD ONC ARIA REFERENCE POINT SESSION DOSAGE GIVEN: 2.07

## 2023-01-11 PROCEDURE — 77386 HCHG IMRT DELIVERY COMPLEX: CPT | Performed by: RADIOLOGY

## 2023-01-11 PROCEDURE — 77014 PR CT GUIDANCE PLACEMENT RAD THERAPY FIELDS: CPT | Mod: 26 | Performed by: RADIOLOGY

## 2023-01-11 ASSESSMENT — FIBROSIS 4 INDEX: FIB4 SCORE: 1.17

## 2023-01-11 ASSESSMENT — PAIN SCALES - GENERAL: PAINLEVEL: NO PAIN

## 2023-01-11 NOTE — ON TREATMENT VISIT
ON TREATMENT  NOTE  RADIATION ONCOLOGY DEPARTMENT    Patient name:  Madison Allen    Primary Physician:  Pcp Pt States None MRN: [id number]  CSN: [id number]   Referring physician:  No ref. provider found   : 1980, 42 y.o.     ENCOUNTER DATE:  2023      DIAGNOSIS:  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 on 2022  Stage prefix: Initial diagnosis  Laterality: Right  Biopsy of metastatic site performed: No      TREATMENT SUMMARY:  Course First Treatment Date 2022  Course Last Treatment Date 2023  Radiation Treatments       Active   Plans   R Buttock   Most recent treatment: Dose planned: 200 cGy (fraction 19 of 25 on 2023)   Total: Dose planned: 5,000 cGy   Elapsed Days:  @            Historical   No historical radiation treatments to show.                 SUBJECTIVE:  Pain improving    VITAL SIGNS:      2022   Vitals   SYSTOLIC 123 118 116 108 102 122 112   DIASTOLIC 79 78 77 86 66 79 72   Pulse 80 66 81 78 75 71 75   Temperature 36.4 °C (97.6 °F) 36.8 °C (98.2 °F) 36.4 °C (97.6 °F) 36.6 °C (97.8 °F) 36.4 °C (97.6 °F) 36.7 °C (98 °F) 36.9 °C (98.4 °F)   Weight 147.8 149.1 148  148 147.93 148   BMI 24.6 kg/m2 24.81 kg/m2 24.63 kg/m2  24.63 kg/m2 24.62 kg/m2 24.63 kg/m2   Pulse Oximetry 100 % 99 % 97 % 100 % 99 % 98 % 95 %       Multiple values from one day are sorted in reverse-chronological order     KPS: 100, Fully active, able to carry on all pre-disease performed without restriction (ECOG equivalent 0)  Encounter Vitals  Temperature: 36.4 °C (97.6 °F)  Blood Pressure: 123/79  Pulse: 80  Pulse Oximetry: 100 %  Weight: 67 kg (147 lb 12.8 oz)  Pain Score: No pain      2022   Pain Assessment   Pain Score NO PAIN NO PAIN NO  PAIN NO PAIN 7=MODERATE-S NO PAIN       Multiple values from one day are sorted in reverse-chronological order          PHYSICAL EXAM:  Physical Exam  Skin:     Findings: No erythema.            1/11/2023 1/4/2023 12/28/2022 12/14/2022   Toxicity Assessment   Toxicity Assessment Skin Skin Skin Skin   Fatigue (lethargy, malaise, asthenia) None None None None   Radiation Dermatitis None None None None   Photosensitivity None None None None   Dry Skin Normal Normal Normal Normal   Alopecia Normal Normal Normal Normal   Erythema Multiforme (e.g., Brice-Kal syndrome, toxic epidermal necrolysis) Absent Absent Absent Absent   Nail Changes Normal Normal Normal Normal   Wound (Non-Infectious) None None None None   Wound (Infectious) None None None None   RT - Pain due to RT None None None None   Tumor Pain (onset or exacerbation of tumor pain due to treatment) None None None None   Skin - Late RT No change from baseline No change from baseline No change from baseline No change from baseline       Multiple values from one day are sorted in reverse-chronological order       CURRENT MEDICATIONS:    Current Outpatient Medications:     etonogestrel (NEXPLANON) 68 MG Implant implant, Inject 1 Each under the skin one time., Disp: , Rfl:     LABORATORY DATA:   Lab Results   Component Value Date/Time    SODIUM 137 11/02/2022 01:53 AM    POTASSIUM 3.9 11/02/2022 01:53 AM    CHLORIDE 105 11/02/2022 01:53 AM    CO2 21 11/02/2022 01:53 AM    GLUCOSE 96 11/02/2022 01:53 AM    BUN 17 11/02/2022 01:53 AM    CREATININE 0.64 11/02/2022 01:53 AM    CREATININE 0.8 09/06/2007 10:25 AM       Lab Results   Component Value Date/Time    WBC 7.7 11/02/2022 01:53 AM    RBC 4.68 11/02/2022 01:53 AM    HEMOGLOBIN 12.5 11/02/2022 01:53 AM    HEMATOCRIT 39.3 11/02/2022 01:53 AM    MCV 84.0 11/02/2022 01:53 AM    MCH 26.7 (L) 11/02/2022 01:53 AM    MCHC 31.8 (L) 11/02/2022 01:53 AM    PLATELETCT 191 11/02/2022 01:53 AM         RADIOLOGY DATA:  No  results found.    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 on 12/2/2022      PLAN:  No change in treatment plan    Disposition:  Treatment plan and imaging reviewed. Questions answered. Continue therapy outlined.     Charles Tian M.D.    No orders of the defined types were placed in this encounter.

## 2023-01-12 ENCOUNTER — HOSPITAL ENCOUNTER (OUTPATIENT)
Dept: RADIATION ONCOLOGY | Facility: MEDICAL CENTER | Age: 43
End: 2023-01-12
Payer: COMMERCIAL

## 2023-01-12 LAB
CHEMOTHERAPY INFUSION START DATE: NORMAL
CHEMOTHERAPY RECORDS: 2
CHEMOTHERAPY RECORDS: 5000
CHEMOTHERAPY RECORDS: NORMAL
CHEMOTHERAPY RX CANCER: NORMAL
DATE 1ST CHEMO CANCER: NORMAL
RAD ONC ARIA COURSE LAST TREATMENT DATE: NORMAL
RAD ONC ARIA COURSE TREATMENT ELAPSED DAYS: NORMAL
RAD ONC ARIA REFERENCE POINT DOSAGE GIVEN TO DATE: 40
RAD ONC ARIA REFERENCE POINT DOSAGE GIVEN TO DATE: 41.32
RAD ONC ARIA REFERENCE POINT ID: NORMAL
RAD ONC ARIA REFERENCE POINT ID: NORMAL
RAD ONC ARIA REFERENCE POINT SESSION DOSAGE GIVEN: 2
RAD ONC ARIA REFERENCE POINT SESSION DOSAGE GIVEN: 2.07

## 2023-01-12 PROCEDURE — 77427 RADIATION TX MANAGEMENT X5: CPT | Performed by: RADIOLOGY

## 2023-01-12 PROCEDURE — 77386 HCHG IMRT DELIVERY COMPLEX: CPT | Performed by: RADIOLOGY

## 2023-01-12 PROCEDURE — 77014 PR CT GUIDANCE PLACEMENT RAD THERAPY FIELDS: CPT | Mod: 26 | Performed by: RADIOLOGY

## 2023-01-13 ENCOUNTER — HOSPITAL ENCOUNTER (OUTPATIENT)
Dept: RADIATION ONCOLOGY | Facility: MEDICAL CENTER | Age: 43
End: 2023-01-13
Payer: COMMERCIAL

## 2023-01-13 LAB
CHEMOTHERAPY INFUSION START DATE: NORMAL
CHEMOTHERAPY RECORDS: 2
CHEMOTHERAPY RECORDS: 5000
CHEMOTHERAPY RECORDS: NORMAL
CHEMOTHERAPY RX CANCER: NORMAL
DATE 1ST CHEMO CANCER: NORMAL
RAD ONC ARIA COURSE LAST TREATMENT DATE: NORMAL
RAD ONC ARIA COURSE TREATMENT ELAPSED DAYS: NORMAL
RAD ONC ARIA REFERENCE POINT DOSAGE GIVEN TO DATE: 42
RAD ONC ARIA REFERENCE POINT DOSAGE GIVEN TO DATE: 43.38
RAD ONC ARIA REFERENCE POINT ID: NORMAL
RAD ONC ARIA REFERENCE POINT ID: NORMAL
RAD ONC ARIA REFERENCE POINT SESSION DOSAGE GIVEN: 2
RAD ONC ARIA REFERENCE POINT SESSION DOSAGE GIVEN: 2.07

## 2023-01-13 PROCEDURE — 77014 PR CT GUIDANCE PLACEMENT RAD THERAPY FIELDS: CPT | Mod: 26 | Performed by: RADIOLOGY

## 2023-01-13 PROCEDURE — 77386 HCHG IMRT DELIVERY COMPLEX: CPT | Performed by: RADIOLOGY

## 2023-01-16 ENCOUNTER — HOSPITAL ENCOUNTER (OUTPATIENT)
Dept: RADIATION ONCOLOGY | Facility: MEDICAL CENTER | Age: 43
End: 2023-01-16
Payer: COMMERCIAL

## 2023-01-16 LAB
CHEMOTHERAPY INFUSION START DATE: NORMAL
CHEMOTHERAPY RECORDS: 2
CHEMOTHERAPY RECORDS: 5000
CHEMOTHERAPY RECORDS: NORMAL
CHEMOTHERAPY RX CANCER: NORMAL
DATE 1ST CHEMO CANCER: NORMAL
RAD ONC ARIA COURSE LAST TREATMENT DATE: NORMAL
RAD ONC ARIA COURSE TREATMENT ELAPSED DAYS: NORMAL
RAD ONC ARIA REFERENCE POINT DOSAGE GIVEN TO DATE: 44
RAD ONC ARIA REFERENCE POINT DOSAGE GIVEN TO DATE: 45.45
RAD ONC ARIA REFERENCE POINT ID: NORMAL
RAD ONC ARIA REFERENCE POINT ID: NORMAL
RAD ONC ARIA REFERENCE POINT SESSION DOSAGE GIVEN: 2
RAD ONC ARIA REFERENCE POINT SESSION DOSAGE GIVEN: 2.07

## 2023-01-16 PROCEDURE — 77386 HCHG IMRT DELIVERY COMPLEX: CPT | Performed by: RADIOLOGY

## 2023-01-16 PROCEDURE — 77014 PR CT GUIDANCE PLACEMENT RAD THERAPY FIELDS: CPT | Mod: 26 | Performed by: RADIOLOGY

## 2023-01-17 ENCOUNTER — HOSPITAL ENCOUNTER (OUTPATIENT)
Dept: RADIATION ONCOLOGY | Facility: MEDICAL CENTER | Age: 43
End: 2023-01-17
Payer: COMMERCIAL

## 2023-01-17 LAB
CHEMOTHERAPY INFUSION START DATE: NORMAL
CHEMOTHERAPY RECORDS: 2
CHEMOTHERAPY RECORDS: 5000
CHEMOTHERAPY RECORDS: NORMAL
CHEMOTHERAPY RX CANCER: NORMAL
DATE 1ST CHEMO CANCER: NORMAL
RAD ONC ARIA COURSE LAST TREATMENT DATE: NORMAL
RAD ONC ARIA COURSE TREATMENT ELAPSED DAYS: NORMAL
RAD ONC ARIA REFERENCE POINT DOSAGE GIVEN TO DATE: 46
RAD ONC ARIA REFERENCE POINT DOSAGE GIVEN TO DATE: 47.52
RAD ONC ARIA REFERENCE POINT ID: NORMAL
RAD ONC ARIA REFERENCE POINT ID: NORMAL
RAD ONC ARIA REFERENCE POINT SESSION DOSAGE GIVEN: 2
RAD ONC ARIA REFERENCE POINT SESSION DOSAGE GIVEN: 2.07

## 2023-01-17 PROCEDURE — 77336 RADIATION PHYSICS CONSULT: CPT | Performed by: RADIOLOGY

## 2023-01-17 PROCEDURE — 77386 HCHG IMRT DELIVERY COMPLEX: CPT | Performed by: RADIOLOGY

## 2023-01-17 PROCEDURE — 77014 PR CT GUIDANCE PLACEMENT RAD THERAPY FIELDS: CPT | Mod: 26 | Performed by: RADIOLOGY

## 2023-01-18 ENCOUNTER — HOSPITAL ENCOUNTER (OUTPATIENT)
Dept: RADIATION ONCOLOGY | Facility: MEDICAL CENTER | Age: 43
End: 2023-01-18
Payer: COMMERCIAL

## 2023-01-18 ENCOUNTER — HOSPITAL ENCOUNTER (OUTPATIENT)
Dept: RADIATION ONCOLOGY | Facility: MEDICAL CENTER | Age: 43
End: 2023-01-31
Attending: RADIOLOGY

## 2023-01-18 VITALS
SYSTOLIC BLOOD PRESSURE: 118 MMHG | DIASTOLIC BLOOD PRESSURE: 63 MMHG | BODY MASS INDEX: 24.66 KG/M2 | OXYGEN SATURATION: 100 % | TEMPERATURE: 97.2 F | HEART RATE: 73 BPM | WEIGHT: 148.2 LBS

## 2023-01-18 LAB
CHEMOTHERAPY INFUSION START DATE: NORMAL
CHEMOTHERAPY RECORDS: 2
CHEMOTHERAPY RECORDS: 5000
CHEMOTHERAPY RECORDS: NORMAL
CHEMOTHERAPY RX CANCER: NORMAL
DATE 1ST CHEMO CANCER: NORMAL
RAD ONC ARIA COURSE LAST TREATMENT DATE: NORMAL
RAD ONC ARIA COURSE TREATMENT ELAPSED DAYS: NORMAL
RAD ONC ARIA REFERENCE POINT DOSAGE GIVEN TO DATE: 48
RAD ONC ARIA REFERENCE POINT DOSAGE GIVEN TO DATE: 49.58
RAD ONC ARIA REFERENCE POINT ID: NORMAL
RAD ONC ARIA REFERENCE POINT ID: NORMAL
RAD ONC ARIA REFERENCE POINT SESSION DOSAGE GIVEN: 2
RAD ONC ARIA REFERENCE POINT SESSION DOSAGE GIVEN: 2.07

## 2023-01-18 PROCEDURE — 77386 HCHG IMRT DELIVERY COMPLEX: CPT | Performed by: RADIOLOGY

## 2023-01-18 PROCEDURE — 77014 PR CT GUIDANCE PLACEMENT RAD THERAPY FIELDS: CPT | Mod: 26 | Performed by: RADIOLOGY

## 2023-01-18 ASSESSMENT — FIBROSIS 4 INDEX: FIB4 SCORE: 1.17

## 2023-01-18 ASSESSMENT — PAIN SCALES - GENERAL: PAINLEVEL: NO PAIN

## 2023-01-18 NOTE — ON TREATMENT VISIT
ON TREATMENT  NOTE  RADIATION ONCOLOGY DEPARTMENT    Patient name:  Madison Allen    Primary Physician:  Pcp Pt States None MRN: [id number]  CSN: [id number]   Referring physician:  No ref. provider found   : 1980, 42 y.o.     ENCOUNTER DATE:  2023      DIAGNOSIS:  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 on 2022  Stage prefix: Initial diagnosis  Laterality: Right  Biopsy of metastatic site performed: No      TREATMENT SUMMARY:  Course First Treatment Date 2022  Course Last Treatment Date 2023  Radiation Treatments       Active   Plans   R Buttock   Most recent treatment: Dose planned: 200 cGy (fraction 24 of 25 on 2023)   Total: Dose planned: 5,000 cGy   Elapsed Days: 36 @            Historical   No historical radiation treatments to show.                 SUBJECTIVE:  Well appearing, tumor shrinking and pain decreased    VITAL SIGNS:      2022   Vitals   SYSTOLIC 118 123 118 116 108 102 122   DIASTOLIC 63 79 78 77 86 66 79   Pulse 73 80 66 81 78 75 71   Temperature 36.2 °C (97.2 °F) 36.4 °C (97.6 °F) 36.8 °C (98.2 °F) 36.4 °C (97.6 °F) 36.6 °C (97.8 °F) 36.4 °C (97.6 °F) 36.7 °C (98 °F)   Weight 148.2 147.8 149.1 148  148 147.93   BMI 24.66 kg/m2 24.6 kg/m2 24.81 kg/m2 24.63 kg/m2  24.63 kg/m2 24.62 kg/m2   Pulse Oximetry 100 % 100 % 99 % 97 % 100 % 99 % 98 %       Multiple values from one day are sorted in reverse-chronological order     KPS: 100, Fully active, able to carry on all pre-disease performed without restriction (ECOG equivalent 0)  Encounter Vitals  Temperature: 36.2 °C (97.2 °F)  Blood Pressure: 118/63  Pulse: 73  Pulse Oximetry: 100 %  Weight: 67.2 kg (148 lb 3.2 oz)  Pain Score: No pain      2022   Pain  Assessment   Pain Score NO PAIN NO PAIN NO PAIN NO PAIN NO PAIN 7=MODERATE-S       Multiple values from one day are sorted in reverse-chronological order          PHYSICAL EXAM:  Physical Exam         1/18/2023 1/11/2023 1/4/2023 12/28/2022 12/14/2022   Toxicity Assessment   Toxicity Assessment Skin Skin Skin Skin Skin   Fatigue (lethargy, malaise, asthenia) Moderate (e.g., decrease in performance status by 1 ECOG level or 20% Karnofsky) or causing difficulty performing some activities None None None None   Radiation Dermatitis None None None None None   Photosensitivity None None None None None   Dry Skin Normal Normal Normal Normal Normal   Alopecia Normal Normal Normal Normal Normal   Erythema Multiforme (e.g., Brice-Kal syndrome, toxic epidermal necrolysis) Absent Absent Absent Absent Absent   Nail Changes Normal Normal Normal Normal Normal   Wound (Non-Infectious) None None None None None   Wound (Infectious) None None None None None   RT - Pain due to RT None None None None None   Tumor Pain (onset or exacerbation of tumor pain due to treatment) None None None None None   Skin - Late RT No change from baseline No change from baseline No change from baseline No change from baseline No change from baseline       Multiple values from one day are sorted in reverse-chronological order       CURRENT MEDICATIONS:    Current Outpatient Medications:     etonogestrel (NEXPLANON) 68 MG Implant implant, Inject 1 Each under the skin one time., Disp: , Rfl:     LABORATORY DATA:   Lab Results   Component Value Date/Time    SODIUM 137 11/02/2022 01:53 AM    POTASSIUM 3.9 11/02/2022 01:53 AM    CHLORIDE 105 11/02/2022 01:53 AM    CO2 21 11/02/2022 01:53 AM    GLUCOSE 96 11/02/2022 01:53 AM    BUN 17 11/02/2022 01:53 AM    CREATININE 0.64 11/02/2022 01:53 AM    CREATININE 0.8 09/06/2007 10:25 AM       Lab Results   Component Value Date/Time    WBC 7.7 11/02/2022 01:53 AM    RBC 4.68 11/02/2022 01:53 AM    HEMOGLOBIN 12.5  11/02/2022 01:53 AM    HEMATOCRIT 39.3 11/02/2022 01:53 AM    MCV 84.0 11/02/2022 01:53 AM    MCH 26.7 (L) 11/02/2022 01:53 AM    MCHC 31.8 (L) 11/02/2022 01:53 AM    PLATELETCT 191 11/02/2022 01:53 AM         RADIOLOGY DATA:  No results found.    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 on 12/2/2022      PLAN:  No change in treatment plan    Disposition:  Treatment plan and imaging reviewed. Questions answered. Continue therapy outlined.     Charles Tian M.D.    No orders of the defined types were placed in this encounter.

## 2023-01-19 ENCOUNTER — HOSPITAL ENCOUNTER (OUTPATIENT)
Dept: RADIATION ONCOLOGY | Facility: MEDICAL CENTER | Age: 43
End: 2023-01-19
Payer: COMMERCIAL

## 2023-01-19 LAB
CHEMOTHERAPY INFUSION START DATE: NORMAL
CHEMOTHERAPY INFUSION START DATE: NORMAL
CHEMOTHERAPY INFUSION STOP DATE: NORMAL
CHEMOTHERAPY RECORDS: 2
CHEMOTHERAPY RECORDS: 2
CHEMOTHERAPY RECORDS: 5000
CHEMOTHERAPY RECORDS: 5000
CHEMOTHERAPY RECORDS: NORMAL
CHEMOTHERAPY RX CANCER: NORMAL
CHEMOTHERAPY RX CANCER: NORMAL
DATE 1ST CHEMO CANCER: NORMAL
DATE 1ST CHEMO CANCER: NORMAL
RAD ONC ARIA COURSE LAST TREATMENT DATE: NORMAL
RAD ONC ARIA COURSE LAST TREATMENT DATE: NORMAL
RAD ONC ARIA COURSE TREATMENT ELAPSED DAYS: NORMAL
RAD ONC ARIA COURSE TREATMENT ELAPSED DAYS: NORMAL
RAD ONC ARIA REFERENCE POINT DOSAGE GIVEN TO DATE: 50
RAD ONC ARIA REFERENCE POINT DOSAGE GIVEN TO DATE: 50
RAD ONC ARIA REFERENCE POINT DOSAGE GIVEN TO DATE: 51.65
RAD ONC ARIA REFERENCE POINT DOSAGE GIVEN TO DATE: 51.65
RAD ONC ARIA REFERENCE POINT ID: NORMAL
RAD ONC ARIA REFERENCE POINT SESSION DOSAGE GIVEN: 2
RAD ONC ARIA REFERENCE POINT SESSION DOSAGE GIVEN: 2.07

## 2023-01-19 PROCEDURE — 77427 RADIATION TX MANAGEMENT X5: CPT | Performed by: RADIOLOGY

## 2023-01-19 PROCEDURE — 77014 PR CT GUIDANCE PLACEMENT RAD THERAPY FIELDS: CPT | Mod: 26 | Performed by: RADIOLOGY

## 2023-01-19 PROCEDURE — 77386 HCHG IMRT DELIVERY COMPLEX: CPT | Performed by: RADIOLOGY

## 2023-01-19 NOTE — RADIATION COMPLETION NOTES
END OF TREATMENT SUMMARY    Patient name:  Madison Allen    Primary Physician:  Pcp Pt States None MRN: [id number]  CSN: [id number]   Referring physician:  Artie Caldwell M. : 1980, 42 y.o.       TREATMENT SUMMARY:        Course First Treatment Date 2022    Course Last Treatment Date 2023   Course Elapsed Days 37 @ [id number]   Course Intent Curative     Radiation Therapy Episodes       Active Episodes       Radiation Therapy: IMRT (2022)                   Radiation Treatments         Plan Last Treated On Elapsed Days Fractions Treated Prescribed Fraction Dose (cGy) Prescribed Total Dose (cGy)    R Buttock 2023 37 @ [id number] 25 of 25 200 5,000                  Reference Point Last Treated On Elapsed Days Most Recent Session Dose (cGy) Total Dose (cGy)    PTV 2023 37 @ [id number] 200 5,000    R Buttock cp 2023 37 @ [id number] 207 5,165                                     STAGE:   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 on 2022  Stage prefix: Initial diagnosis  Laterality: Right  Biopsy of metastatic site performed: No       TREATMENT INDICATION:   curative     CONCURRENT SYSTEMIC TREATMENT:   none     RT COURSE DISCONTINUED EARLY:   No     PATIENT EXPERIENCE:       2022   Toxicity Assessment   Toxicity Assessment Skin Skin Skin Skin Skin   Fatigue (lethargy, malaise, asthenia) Moderate (e.g., decrease in performance status by 1 ECOG level or 20% Karnofsky) or causing difficulty performing some activities None None None None   Radiation Dermatitis None None None None None   Photosensitivity None None None None None   Dry Skin Normal Normal Normal Normal Normal   Alopecia Normal Normal Normal Normal Normal   Erythema Multiforme (e.g., Brice-Kal syndrome, toxic epidermal  necrolysis) Absent Absent Absent Absent Absent   Nail Changes Normal Normal Normal Normal Normal   Wound (Non-Infectious) None None None None None   Wound (Infectious) None None None None None   RT - Pain due to RT None None None None None   Tumor Pain (onset or exacerbation of tumor pain due to treatment) None None None None None   Skin - Late RT No change from baseline No change from baseline No change from baseline No change from baseline No change from baseline       Multiple values from one day are sorted in reverse-chronological order        FOLLOW-UP PLAN:   6 Weeks     COMMENT:          ANATOMIC TARGET SUMMARY    ANATOMIC TARGET MODALITY TECHNIQUE   Left leg   External beam, photons IMRT            COMMENT:         DIAGRAMS:      DOSE VOLUME HISTOGRAMS:

## 2023-02-01 ENCOUNTER — OFFICE VISIT (OUTPATIENT)
Dept: SURGICAL ONCOLOGY | Facility: MEDICAL CENTER | Age: 43
End: 2023-02-01

## 2023-02-01 VITALS
TEMPERATURE: 97.8 F | HEART RATE: 77 BPM | DIASTOLIC BLOOD PRESSURE: 62 MMHG | OXYGEN SATURATION: 99 % | BODY MASS INDEX: 23.96 KG/M2 | WEIGHT: 144 LBS | SYSTOLIC BLOOD PRESSURE: 112 MMHG

## 2023-02-01 DIAGNOSIS — C49.9 MYXOID LIPOSARCOMA (HCC): ICD-10-CM

## 2023-02-01 PROCEDURE — 99215 OFFICE O/P EST HI 40 MIN: CPT | Performed by: SURGERY

## 2023-02-01 ASSESSMENT — FIBROSIS 4 INDEX: FIB4 SCORE: 1.17

## 2023-02-02 ENCOUNTER — HOSPITAL ENCOUNTER (OUTPATIENT)
Dept: RADIOLOGY | Facility: MEDICAL CENTER | Age: 43
End: 2023-02-02
Attending: RADIOLOGY
Payer: COMMERCIAL

## 2023-02-02 DIAGNOSIS — C49.9 MYXOID LIPOSARCOMA (HCC): ICD-10-CM

## 2023-02-02 PROCEDURE — 700117 HCHG RX CONTRAST REV CODE 255: Performed by: RADIOLOGY

## 2023-02-02 PROCEDURE — 71260 CT THORAX DX C+: CPT

## 2023-02-02 RX ADMIN — IOHEXOL 100 ML: 350 INJECTION, SOLUTION INTRAVENOUS at 08:42

## 2023-02-10 ENCOUNTER — APPOINTMENT (OUTPATIENT)
Dept: RADIATION ONCOLOGY | Facility: MEDICAL CENTER | Age: 43
End: 2023-02-10
Attending: RADIOLOGY

## 2023-02-10 ASSESSMENT — ENCOUNTER SYMPTOMS
NECK PAIN: 0
DOUBLE VISION: 0
CLAUDICATION: 0
WEIGHT LOSS: 0
NERVOUS/ANXIOUS: 0
ORTHOPNEA: 0
DIARRHEA: 0
HEADACHES: 0
BACK PAIN: 0
HALLUCINATIONS: 0
TINGLING: 0
EYE PAIN: 0
DIZZINESS: 0
CHILLS: 0
BRUISES/BLEEDS EASILY: 0
FOCAL WEAKNESS: 0
FEVER: 0
CONSTIPATION: 0
EYE DISCHARGE: 0
HEMOPTYSIS: 0
DEPRESSION: 0
HEARTBURN: 0
SENSORY CHANGE: 0
MYALGIAS: 0
SPEECH CHANGE: 0
PALPITATIONS: 0
SPUTUM PRODUCTION: 0
COUGH: 0
NAUSEA: 0
VOMITING: 0
BLURRED VISION: 0
ABDOMINAL PAIN: 0
PHOTOPHOBIA: 0
TREMORS: 0

## 2023-02-10 ASSESSMENT — LIFESTYLE VARIABLES: SUBSTANCE_ABUSE: 0

## 2023-02-10 NOTE — PROGRESS NOTES
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  The patient noted a small mass in her right gluteal region in June 2020.  Over the last few months it has started to increase significantly in size.  She not been symptomatic from it previously but it has started to cause her some discomfort with sitting down, driving, going to the bathroom.  She denies any pain, weakness, decreased sensation in the right lower extremity.  She was initially evaluated in our medical oncology clinic and a biopsy was performed.  The biopsy was consistent with a myxoid liposarcoma with no round cell component identified in the limited sample.    She presents to me today for to discuss the management options.    Summary of work-up to date:   CT pelvis with contrast ([facility]) 11/1/2022:Fairly well-circumscribed ovoid mass in the right gluteus denise muscle measuring 15 x 9 x 6 cm.  Characterized by areas of mostly fluid density but some fatty components are seen as well.  No extension beyond the confines of the muscle is seen.  Some fat is seen peripherally to the margins.  No lymphadenopathy.    MRI femur with and without contrast right ([facility]) 11/2/2022: Large soft tissue mass involving the right buttock inferiorly which extends into the right upper posterior thigh.  Appears to involve the gluteus medius denise muscle as well as contact the hamstrings muscle.  Measures 16 x 10 x 7.3 cm in size and characterized by heterogeneous increased T2 signal with mixed intermediate and increased T1 signal with heterogeneous enhancement.  No evidence of adenopathy.    CT  "chest abdomen pelvis without contrast ([facility]) 2022: No noncontrast evidence of metastasis.  Right gluteus denise centered mass is unchanged.  \"Prior cholecystectomy.  Calcification the right lobe of the liver is indeterminate but may represent remote granulomatous disease.    IR guided biopsy ([facility]) 2022: Pathology of right posterior thigh mass on core biopsy consistent with myxoid liposarcoma.  There is no round cell component identified in the limited sample.          Patient presents today for surgical evaluation of newly identified myxoid liposarcoma of the right gluteus muscle extending down to the right posterior thigh.  She states that she does feel as if the mass is grown since her biopsy was performed.  She does has discomfort directly over the area when she sits down, drives, goes to the bathroom.  She denies any neuromuscular symptoms of the right lower extremity and appears to have full function of the extremity.  She denies any fevers or chills.  No recent weight loss.  Denies any masses in any other places.    Update 2023:   Patient presents today for follow-up post neoadjuvant radiation therapy.  She did well with therapy did not experience any significant complications and notes that she feels that the mass has shrunk significantly since I originally saw her.  She completed radiation on 2023 with a total of 50 Gray in 25 fractions.  She has done remarkably well.  She denies any pain over the area of the mass.  She is scheduled for a CT of the chest abdomen and pelvis tomorrow.      Body mass index is 23.96 kg/m².    ECO    Past Medical History:          Past Medical History:   Diagnosis Date    Pregnant state, incidental        Past Surgical History:        Past Surgical History:   Procedure Laterality Date    CHOLECYSTECTOMY  Dx     no complications       Current Medications:   Home Medications    Medication Sig Taking? Last Dose Authorizing Provider   etonogestrel " (NEXPLANON) 68 MG Implant implant Inject 1 Each under the skin one time.   Physician Outpatient            Allergies:         Allergies   Allergen Reactions    Nkda [No Known Drug Allergy]        Family History:          Family History   Problem Relation Age of Onset    Heart Disease Mother 62        hearth attack    Diabetes Mother         pills    Hypertension Mother     Cancer Father         Lung    Alcohol/Drug Father         alcoholic    Thyroid Sister     Cancer Brother         Lung    Diabetes Brother         diet       Social History:          Social History     Socioeconomic History    Marital status: Single     Spouse name: Not on file    Number of children: Not on file    Years of education: Not on file    Highest education level: Not on file   Occupational History    Not on file   Tobacco Use    Smoking status: Never    Smokeless tobacco: Never    Tobacco comments:     last used 9 years ago   Vaping Use    Vaping Use: Never used   Substance and Sexual Activity    Alcohol use: No     Comment: socially    Drug use: No    Sexual activity: Yes     Partners: Male     Birth control/protection: Condom   Other Topics Concern    Not on file   Social History Narrative    Not on file     Social Determinants of Health     Financial Resource Strain: Not on file   Food Insecurity: Not on file   Transportation Needs: Not on file   Physical Activity: Not on file   Stress: Not on file   Social Connections: Not on file   Intimate Partner Violence: Not on file   Housing Stability: Not on file       Review of Systems:  Review of Systems - History obtained from the patient    Review of Systems   Constitutional:  Negative for chills, fever, malaise/fatigue and weight loss.   HENT:  Negative for congestion, ear discharge, ear pain, hearing loss and nosebleeds.    Eyes:  Negative for blurred vision, double vision, photophobia, pain and discharge.   Respiratory:  Negative for cough, hemoptysis and sputum production.     Cardiovascular:  Negative for chest pain, palpitations, orthopnea and claudication.   Gastrointestinal:  Negative for abdominal pain, constipation, diarrhea, heartburn, nausea and vomiting.   Genitourinary:  Negative for dysuria, frequency, hematuria and urgency.   Musculoskeletal:  Negative for back pain, joint pain, myalgias and neck pain.   Skin:  Negative for itching and rash.   Neurological:  Negative for dizziness, tingling, tremors, sensory change, speech change, focal weakness and headaches.   Endo/Heme/Allergies:  Negative for environmental allergies. Does not bruise/bleed easily.   Psychiatric/Behavioral:  Negative for depression, hallucinations, substance abuse and suicidal ideas. The patient is not nervous/anxious.      All other ROS negative.      Physical Exam:  /62 (BP Location: Right arm, Patient Position: Sitting, BP Cuff Size: Adult)   Pulse 77   Temp 36.6 °C (97.8 °F) (Temporal)   Wt 65.3 kg (144 lb)   SpO2 99%   BMI 23.96 kg/m²       Physical Exam  Constitutional:       General: She is not in acute distress.     Appearance: Normal appearance. She is not ill-appearing.   HENT:      Head: Normocephalic.   Eyes:      Conjunctiva/sclera: Conjunctivae normal.      Pupils: Pupils are equal, round, and reactive to light.   Cardiovascular:      Rate and Rhythm: Normal rate and regular rhythm.   Pulmonary:      Effort: Pulmonary effort is normal. No respiratory distress.   Abdominal:      General: Abdomen is flat.      Palpations: Abdomen is soft.   Musculoskeletal:         General: Deformity present.      Cervical back: Neck supple.      Comments: Right posterior gluteal region there is still a palpable mass but it is shrunken significantly, somewhat difficult to palpate now but approximately 7 cm in size.  It does not feel to be extending down to the hamstrings muscle any longer.  Overlying skin in that area does have some radiation changes but otherwise appears healthy   Neurological:       "Mental Status: She is alert.            Imaging:   CT pelvis with contrast ([facility]) 11/1/2022:Fairly well-circumscribed ovoid mass in the right gluteus denise muscle measuring 15 x 9 x 6 cm.  Characterized by areas of mostly fluid density but some fatty components are seen as well.  No extension beyond the confines of the muscle is seen.  Some fat is seen peripherally to the margins.  No lymphadenopathy.    MRI femur with and without contrast right ([facility]) 11/2/2022: Large soft tissue mass involving the right buttock inferiorly which extends into the right upper posterior thigh.  Appears to involve the gluteus medius denise muscle as well as contact the hamstrings muscle.  Measures 16 x 10 x 7.3 cm in size and characterized by heterogeneous increased T2 signal with mixed intermediate and increased T1 signal with heterogeneous enhancement.  No evidence of adenopathy.    CT chest abdomen pelvis without contrast ([facility]) 11/2/2022: No noncontrast evidence of metastasis.  Right gluteus denise centered mass is unchanged.  \"Prior cholecystectomy.  Calcification the right lobe of the liver is indeterminate but may represent remote granulomatous disease.    CT chest abdomen pelvis with contrast 2/2/2023:  1.  Negative for metastatic disease within the chest, abdomen, or pelvis  2.  2.6 cm right adnexal cyst is likely physiologic in this age group  3.  Previous cholecystectomy  4.  Right gluteal intramuscular mass is considerably smaller now measuring 5.7 x 2.6 cm and previously 9.3 x 6.0 cm consistent with favorable response to therapy    Pathology:   IR guided biopsy ([facility]) 11/12/2022:    right posterior thigh mass on core biopsy consistent with myxoid liposarcoma.  There is no round cell component identified in the limited sample.    Labs:    Latest Reference Range & Units 11/02/22 01:53   WBC 4.8 - 10.8 K/uL 7.7   RBC 4.20 - 5.40 M/uL 4.68   Hemoglobin 12.0 - 16.0 g/dL 12.5   Hematocrit 37.0 - 47.0 % 39.3   MCV " 81.4 - 97.8 fL 84.0   MCH 27.0 - 33.0 pg 26.7 (L)   MCHC 33.6 - 35.0 g/dL 31.8 (L)   RDW 35.9 - 50.0 fL 43.0   Platelet Count 164 - 446 K/uL 191   MPV 9.0 - 12.9 fL 11.2   Sodium 135 - 145 mmol/L 137   Potassium 3.6 - 5.5 mmol/L 3.9   Chloride 96 - 112 mmol/L 105   Co2 20 - 33 mmol/L 21   Anion Gap 7.0 - 16.0  11.0   Glucose 65 - 99 mg/dL 96   Bun 8 - 22 mg/dL 17   Creatinine 0.50 - 1.40 mg/dL 0.64   GFR (CKD-EPI) >60 mL/min/1.73 m 2 112   Calcium 8.5 - 10.5 mg/dL 9.1   (L): Data is abnormally low    Assessment and Plan:   Patient is a very pleasant 42-year-old female who presents for surgical evaluation for a newly identified right gluteal myxoid liposarcoma.  She has completed neoadjuvant radiation therapy and presents today in follow-up for surgical evaluation.    Today in clinic we reviewed her overall response to therapy.  Based on clinical exam does appear that she has had a significant response to radiation therapy.  Her imaging which was performed the day after this visit also demonstrates a significant reduction in the size of this intramuscular mass now measuring 5.7 x 2.6 cm and was previously 9.3 x 6.0.  I explained to her that there is a possibility now that this is shrunken in size that a operation can be for performed without needing flap coverage of the area however we will refer her to plastic surgery for evaluation given the possibility that she may still need this.  Additionally I will obtain an MRI of the right hip and femur in order to fully delineate the extent of the mass.  After all this is performed we will schedule her for surgery for a radical resection of the right gluteal mass with possible reconstruction with plastic surgery.      Plan:  - Referral placed to plastic surgery for a planned combined surgery with reconstruction  - I reviewed her CT scan which shows a significant response, we will obtain an MRI to better delineate the full extent of the mass to assist in surgical planning  -  We will plan for surgery to occur approximately 6 to 8 weeks after her radiation was completed.    The patient and family asked several great questions which were answered to  their satisfaction.  They  are in agreement with the care plan as outlined above.      Artie Rojas MD  Surgical Oncology

## 2023-02-17 ENCOUNTER — HOSPITAL ENCOUNTER (OUTPATIENT)
Dept: RADIOLOGY | Facility: MEDICAL CENTER | Age: 43
End: 2023-02-17
Attending: SURGERY
Payer: COMMERCIAL

## 2023-02-17 DIAGNOSIS — C49.9 MYXOID LIPOSARCOMA (HCC): ICD-10-CM

## 2023-02-17 PROCEDURE — 700117 HCHG RX CONTRAST REV CODE 255: Performed by: SURGERY

## 2023-02-17 PROCEDURE — A9579 GAD-BASE MR CONTRAST NOS,1ML: HCPCS | Performed by: SURGERY

## 2023-02-17 PROCEDURE — 73720 MRI LWR EXTREMITY W/O&W/DYE: CPT | Mod: RT

## 2023-02-17 RX ADMIN — GADOTERIDOL 14 ML: 279.3 INJECTION, SOLUTION INTRAVENOUS at 10:20

## 2023-03-28 ENCOUNTER — OFFICE VISIT (OUTPATIENT)
Dept: SURGICAL ONCOLOGY | Facility: MEDICAL CENTER | Age: 43
End: 2023-03-28
Payer: COMMERCIAL

## 2023-03-28 VITALS
OXYGEN SATURATION: 95 % | DIASTOLIC BLOOD PRESSURE: 80 MMHG | HEART RATE: 84 BPM | TEMPERATURE: 98.6 F | SYSTOLIC BLOOD PRESSURE: 132 MMHG | BODY MASS INDEX: 24.3 KG/M2 | WEIGHT: 146 LBS

## 2023-03-28 DIAGNOSIS — C49.9 MYXOID LIPOSARCOMA (HCC): ICD-10-CM

## 2023-03-28 PROCEDURE — 99215 OFFICE O/P EST HI 40 MIN: CPT | Performed by: SURGERY

## 2023-03-28 ASSESSMENT — ENCOUNTER SYMPTOMS
FEVER: 0
CLAUDICATION: 0
NECK PAIN: 0
EYE PAIN: 0
MYALGIAS: 0
PHOTOPHOBIA: 0
DOUBLE VISION: 0
TREMORS: 0
DIARRHEA: 0
DEPRESSION: 0
HEADACHES: 0
PALPITATIONS: 0
EYE DISCHARGE: 0
WEIGHT LOSS: 0
NAUSEA: 0
BLURRED VISION: 0
TINGLING: 0
SPUTUM PRODUCTION: 0
ABDOMINAL PAIN: 0
VOMITING: 0
SPEECH CHANGE: 0
HALLUCINATIONS: 0
BACK PAIN: 0
HEMOPTYSIS: 0
ORTHOPNEA: 0
BRUISES/BLEEDS EASILY: 0
CHILLS: 0
COUGH: 0
DIZZINESS: 0
HEARTBURN: 0
CONSTIPATION: 0
NERVOUS/ANXIOUS: 0
FOCAL WEAKNESS: 0
SENSORY CHANGE: 0

## 2023-03-28 ASSESSMENT — LIFESTYLE VARIABLES: SUBSTANCE_ABUSE: 0

## 2023-03-28 ASSESSMENT — FIBROSIS 4 INDEX: FIB4 SCORE: 1.17

## 2023-03-28 NOTE — PROGRESS NOTES
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  The patient noted a small mass in her right gluteal region in June 2020.  Over the last few months it has started to increase significantly in size.  She not been symptomatic from it previously but it has started to cause her some discomfort with sitting down, driving, going to the bathroom.  She denies any pain, weakness, decreased sensation in the right lower extremity.  She was initially evaluated in our medical oncology clinic and a biopsy was performed.  The biopsy was consistent with a myxoid liposarcoma with no round cell component identified in the limited sample.    She presents to me today for to discuss the management options.      Patient presents today for surgical evaluation of newly identified myxoid liposarcoma of the right gluteus muscle extending down to the right posterior thigh.  She states that she does feel as if the mass is grown since her biopsy was performed.  She does has discomfort directly over the area when she sits down, drives, goes to the bathroom.  She denies any neuromuscular symptoms of the right lower extremity and appears to have full function of the extremity.  She denies any fevers or chills.  No recent weight loss.  Denies any masses in any other places.    Update 2/1/2023:   Patient presents today for follow-up post neoadjuvant radiation therapy.  She did well with therapy did not experience any significant complications and notes that she feels that the mass has shrunk significantly since I originally saw her.  She completed radiation on 1/19/2023  with a total of 50 Gray in 25 fractions.  She has done remarkably well.  She denies any pain over the area of the mass.  She is scheduled for a CT of the chest abdomen and pelvis tomorrow.    Date 3/28/2023:    Patient presents today for preoperative discussion.  She was seen by plastic surgery and I had a discussion with Dr. Santizo in regards to the possible need for reconstruction for closure of the wound.  Given the shrinkage of the tumor does feel that we might be able to get this wound closed.  She in general is doing well since I last saw her.  She does not feel any significant growth of the mass of the lesion and is otherwise recovered well from her radiation.      Body mass index is 24.3 kg/m².    ECO    Past Medical History:          Past Medical History:   Diagnosis Date    Pregnant state, incidental        Past Surgical History:        Past Surgical History:   Procedure Laterality Date    CHOLECYSTECTOMY  Dx     no complications       Current Medications:   Home Medications    Medication Sig Taking? Last Dose Authorizing Provider   etonogestrel (NEXPLANON) 68 MG Implant implant Inject 1 Each under the skin one time.   Physician Outpatient            Allergies:         Allergies   Allergen Reactions    Nkda [No Known Drug Allergy]        Family History:          Family History   Problem Relation Age of Onset    Heart Disease Mother 62        hearth attack    Diabetes Mother         pills    Hypertension Mother     Cancer Father         Lung    Alcohol/Drug Father         alcoholic    Thyroid Sister     Cancer Brother         Lung    Diabetes Brother         diet       Social History:          Social History     Socioeconomic History    Marital status: Single     Spouse name: Not on file    Number of children: Not on file    Years of education: Not on file    Highest education level: Not on file   Occupational History    Not on file   Tobacco Use    Smoking status: Never    Smokeless tobacco: Never     Tobacco comments:     last used 9 years ago   Vaping Use    Vaping Use: Never used   Substance and Sexual Activity    Alcohol use: No     Comment: socially    Drug use: No    Sexual activity: Yes     Partners: Male     Birth control/protection: Condom   Other Topics Concern    Not on file   Social History Narrative    Not on file     Social Determinants of Health     Financial Resource Strain: Not on file   Food Insecurity: Not on file   Transportation Needs: Not on file   Physical Activity: Not on file   Stress: Not on file   Social Connections: Not on file   Intimate Partner Violence: Not on file   Housing Stability: Not on file       Review of Systems:  Review of Systems - History obtained from the patient    Review of Systems   Constitutional:  Negative for chills, fever, malaise/fatigue and weight loss.   HENT:  Negative for congestion, ear discharge, ear pain, hearing loss and nosebleeds.    Eyes:  Negative for blurred vision, double vision, photophobia, pain and discharge.   Respiratory:  Negative for cough, hemoptysis and sputum production.    Cardiovascular:  Negative for chest pain, palpitations, orthopnea and claudication.   Gastrointestinal:  Negative for abdominal pain, constipation, diarrhea, heartburn, nausea and vomiting.   Genitourinary:  Negative for dysuria, frequency, hematuria and urgency.   Musculoskeletal:  Negative for back pain, joint pain, myalgias and neck pain.   Skin:  Negative for itching and rash.   Neurological:  Negative for dizziness, tingling, tremors, sensory change, speech change, focal weakness and headaches.   Endo/Heme/Allergies:  Negative for environmental allergies. Does not bruise/bleed easily.   Psychiatric/Behavioral:  Negative for depression, hallucinations, substance abuse and suicidal ideas. The patient is not nervous/anxious.      All other ROS negative.      Physical Exam:  /80 (BP Location: Right arm, Patient Position: Sitting, BP Cuff Size: Adult)   Pulse 84    Temp 37 °C (98.6 °F) (Temporal)   Wt 66.2 kg (146 lb)   SpO2 95%   BMI 24.30 kg/m²       Physical Exam  Constitutional:       General: She is not in acute distress.     Appearance: Normal appearance. She is not ill-appearing.   HENT:      Head: Normocephalic.   Eyes:      Conjunctiva/sclera: Conjunctivae normal.      Pupils: Pupils are equal, round, and reactive to light.   Cardiovascular:      Rate and Rhythm: Normal rate and regular rhythm.   Pulmonary:      Effort: Pulmonary effort is normal. No respiratory distress.   Abdominal:      General: Abdomen is flat.      Palpations: Abdomen is soft.   Musculoskeletal:         General: Deformity present.      Cervical back: Neck supple.      Comments: Right posterior gluteal region there is still a palpable mass but it is shrunken significantly, somewhat difficult to palpate now but approximately 7 cm in size.  It does not feel to be extending down to the hamstrings muscle any longer.  Overlying skin in that area does have some radiation changes but otherwise appears healthy   Neurological:      Mental Status: She is alert.            Imaging:   CT pelvis with contrast ([facility]) 11/1/2022:Fairly well-circumscribed ovoid mass in the right gluteus denise muscle measuring 15 x 9 x 6 cm.  Characterized by areas of mostly fluid density but some fatty components are seen as well.  No extension beyond the confines of the muscle is seen.  Some fat is seen peripherally to the margins.  No lymphadenopathy.    MRI femur with and without contrast right ([facility]) 11/2/2022: Large soft tissue mass involving the right buttock inferiorly which extends into the right upper posterior thigh.  Appears to involve the gluteus medius denise muscle as well as contact the hamstrings muscle.  Measures 16 x 10 x 7.3 cm in size and characterized by heterogeneous increased T2 signal with mixed intermediate and increased T1 signal with heterogeneous enhancement.  No evidence of  "adenopathy.    CT chest abdomen pelvis without contrast ([facility]) 11/2/2022: No noncontrast evidence of metastasis.  Right gluteus denise centered mass is unchanged.  \"Prior cholecystectomy.  Calcification the right lobe of the liver is indeterminate but may represent remote granulomatous disease.    CT chest abdomen pelvis with contrast 2/2/2023:  1.  Negative for metastatic disease within the chest, abdomen, or pelvis  2.  2.6 cm right adnexal cyst is likely physiologic in this age group  3.  Previous cholecystectomy  4.  Right gluteal intramuscular mass is considerably smaller now measuring 5.7 x 2.6 cm and previously 9.3 x 6.0 cm consistent with favorable response to therapy    MRI right femur with and without contrast 2/17/2023:  Interval decrease in size of a large right inferior buttock/upper thigh mass which involves the right gluteus denise muscle and contacts the hamstrings muscles. This currently measures 10 x 5.6 x 3.6 cm in size compared to previous measurement of 16 x   10 x 7.3 cm. This is again most consistent with myxoid variant liposarcoma.    Pathology:   IR guided biopsy ([facility]) 11/12/2022:    right posterior thigh mass on core biopsy consistent with myxoid liposarcoma.  There is no round cell component identified in the limited sample.    Labs:   No recent labs to review    Assessment and Plan:   Patient is a very pleasant 42-year-old female who presents for surgical evaluation for a newly identified right gluteal myxoid liposarcoma.  She has completed neoadjuvant radiation therapy and presents today in follow-up for surgical evaluation.    She has completed her radiation with significant decrease in the size of her known tumor.  She has also been evaluated by plastic surgery.  I discussed with plastic surgery in regards to closure of the wound.  I do feel that given the shrinkage in size of the tumor there is an opportunity to close this wound primarily.  If we are unable to do so we will " place a wound VAC and then plastic surgery will get involved in regards to timing of flap coverage.    At this point she is undergone all of her neoadjuvant therapy and my recommendation is that she undergo surgical resection of this right gluteal mass.  She is in agreement with this plan.    The risk benefits and alternatives of a radical resection of right gluteal mass were explained in detail to the patient.  Including but not limited to bleeding, infection, need for reoperation, weakness of the right leg postoperatively, nerve damage, breakdown, pain, DVT, PE, MI, stroke, and a small risk of death.  All of her questions were answered and she is in agreement to proceed.      Plan:  - We will schedule for radical resection of right gluteal mass.    The patient and family asked several great questions which were answered to  their satisfaction.  They  are in agreement with the care plan as outlined above.      Artie Rojas MD  Surgical Oncology

## 2023-04-10 ENCOUNTER — APPOINTMENT (OUTPATIENT)
Dept: ADMISSIONS | Facility: MEDICAL CENTER | Age: 43
DRG: 502 | End: 2023-04-10
Attending: SURGERY
Payer: COMMERCIAL

## 2023-04-11 ENCOUNTER — PRE-ADMISSION TESTING (OUTPATIENT)
Dept: ADMISSIONS | Facility: MEDICAL CENTER | Age: 43
DRG: 502 | End: 2023-04-11
Attending: SURGERY
Payer: COMMERCIAL

## 2023-04-11 RX ORDER — ACETAMINOPHEN 325 MG/1
650 TABLET ORAL EVERY 4 HOURS PRN
COMMUNITY
End: 2024-01-02

## 2023-04-12 ENCOUNTER — ANESTHESIA EVENT (OUTPATIENT)
Dept: SURGERY | Facility: MEDICAL CENTER | Age: 43
DRG: 502 | End: 2023-04-12
Payer: COMMERCIAL

## 2023-04-13 ENCOUNTER — HOSPITAL ENCOUNTER (INPATIENT)
Facility: MEDICAL CENTER | Age: 43
LOS: 3 days | DRG: 502 | End: 2023-04-16
Attending: SURGERY | Admitting: SURGERY
Payer: COMMERCIAL

## 2023-04-13 ENCOUNTER — ANESTHESIA (OUTPATIENT)
Dept: SURGERY | Facility: MEDICAL CENTER | Age: 43
DRG: 502 | End: 2023-04-13
Payer: COMMERCIAL

## 2023-04-13 DIAGNOSIS — M79.18 GLUTEAL PAIN: ICD-10-CM

## 2023-04-13 DIAGNOSIS — C49.9 MYXOID LIPOSARCOMA (HCC): ICD-10-CM

## 2023-04-13 LAB
ABO GROUP BLD: NORMAL
BLD GP AB SCN SERPL QL: NORMAL
HCG UR QL: NEGATIVE
PATHOLOGY CONSULT NOTE: NORMAL
RH BLD: NORMAL

## 2023-04-13 PROCEDURE — 110371 HCHG SHELL REV 272: Performed by: SURGERY

## 2023-04-13 PROCEDURE — 160009 HCHG ANES TIME/MIN: Performed by: SURGERY

## 2023-04-13 PROCEDURE — 86900 BLOOD TYPING SEROLOGIC ABO: CPT

## 2023-04-13 PROCEDURE — 700105 HCHG RX REV CODE 258: Performed by: SURGERY

## 2023-04-13 PROCEDURE — 700102 HCHG RX REV CODE 250 W/ 637 OVERRIDE(OP): Performed by: SURGERY

## 2023-04-13 PROCEDURE — 76998 US GUIDE INTRAOP: CPT | Mod: 26 | Performed by: SURGERY

## 2023-04-13 PROCEDURE — 160035 HCHG PACU - 1ST 60 MINS PHASE I: Performed by: SURGERY

## 2023-04-13 PROCEDURE — 00300 ANES ALL PX INTEG H/N/PTRUNK: CPT | Performed by: ANESTHESIOLOGY

## 2023-04-13 PROCEDURE — 700105 HCHG RX REV CODE 258: Performed by: ANESTHESIOLOGY

## 2023-04-13 PROCEDURE — 88309 TISSUE EXAM BY PATHOLOGIST: CPT

## 2023-04-13 PROCEDURE — A9270 NON-COVERED ITEM OR SERVICE: HCPCS | Performed by: ANESTHESIOLOGY

## 2023-04-13 PROCEDURE — 86850 RBC ANTIBODY SCREEN: CPT

## 2023-04-13 PROCEDURE — 27059 RESECT HIP/PELV TUM 5 CM/>: CPT | Performed by: SURGERY

## 2023-04-13 PROCEDURE — 160029 HCHG SURGERY MINUTES - 1ST 30 MINS LEVEL 4: Performed by: SURGERY

## 2023-04-13 PROCEDURE — 700102 HCHG RX REV CODE 250 W/ 637 OVERRIDE(OP): Performed by: ANESTHESIOLOGY

## 2023-04-13 PROCEDURE — A9270 NON-COVERED ITEM OR SERVICE: HCPCS | Performed by: SURGERY

## 2023-04-13 PROCEDURE — 700111 HCHG RX REV CODE 636 W/ 250 OVERRIDE (IP): Performed by: ANESTHESIOLOGY

## 2023-04-13 PROCEDURE — 160041 HCHG SURGERY MINUTES - EA ADDL 1 MIN LEVEL 4: Performed by: SURGERY

## 2023-04-13 PROCEDURE — 0KBN0ZZ EXCISION OF RIGHT HIP MUSCLE, OPEN APPROACH: ICD-10-PCS | Performed by: SURGERY

## 2023-04-13 PROCEDURE — 700101 HCHG RX REV CODE 250: Performed by: SURGERY

## 2023-04-13 PROCEDURE — 160002 HCHG RECOVERY MINUTES (STAT): Performed by: SURGERY

## 2023-04-13 PROCEDURE — 160036 HCHG PACU - EA ADDL 30 MINS PHASE I: Performed by: SURGERY

## 2023-04-13 PROCEDURE — 81025 URINE PREGNANCY TEST: CPT

## 2023-04-13 PROCEDURE — 86901 BLOOD TYPING SEROLOGIC RH(D): CPT

## 2023-04-13 PROCEDURE — 700111 HCHG RX REV CODE 636 W/ 250 OVERRIDE (IP): Performed by: SURGERY

## 2023-04-13 PROCEDURE — 160048 HCHG OR STATISTICAL LEVEL 1-5: Performed by: SURGERY

## 2023-04-13 PROCEDURE — 700101 HCHG RX REV CODE 250: Performed by: ANESTHESIOLOGY

## 2023-04-13 PROCEDURE — 94760 N-INVAS EAR/PLS OXIMETRY 1: CPT

## 2023-04-13 PROCEDURE — 770001 HCHG ROOM/CARE - MED/SURG/GYN PRIV*

## 2023-04-13 RX ORDER — ACETAMINOPHEN 500 MG
1000 TABLET ORAL EVERY 6 HOURS PRN
Status: DISCONTINUED | OUTPATIENT
Start: 2023-04-18 | End: 2023-04-16 | Stop reason: HOSPADM

## 2023-04-13 RX ORDER — DEXAMETHASONE SODIUM PHOSPHATE 4 MG/ML
4 INJECTION, SOLUTION INTRA-ARTICULAR; INTRALESIONAL; INTRAMUSCULAR; INTRAVENOUS; SOFT TISSUE
Status: DISCONTINUED | OUTPATIENT
Start: 2023-04-13 | End: 2023-04-15

## 2023-04-13 RX ORDER — ONDANSETRON 2 MG/ML
INJECTION INTRAMUSCULAR; INTRAVENOUS PRN
Status: DISCONTINUED | OUTPATIENT
Start: 2023-04-13 | End: 2023-04-13 | Stop reason: SURG

## 2023-04-13 RX ORDER — HYDROMORPHONE HYDROCHLORIDE 1 MG/ML
0.2 INJECTION, SOLUTION INTRAMUSCULAR; INTRAVENOUS; SUBCUTANEOUS
Status: DISCONTINUED | OUTPATIENT
Start: 2023-04-13 | End: 2023-04-13 | Stop reason: HOSPADM

## 2023-04-13 RX ORDER — OXYCODONE HYDROCHLORIDE 5 MG/1
5 TABLET ORAL
Status: DISCONTINUED | OUTPATIENT
Start: 2023-04-13 | End: 2023-04-16 | Stop reason: HOSPADM

## 2023-04-13 RX ORDER — MIDAZOLAM HYDROCHLORIDE 1 MG/ML
INJECTION INTRAMUSCULAR; INTRAVENOUS PRN
Status: DISCONTINUED | OUTPATIENT
Start: 2023-04-13 | End: 2023-04-13 | Stop reason: SURG

## 2023-04-13 RX ORDER — ACETAMINOPHEN 500 MG
1000 TABLET ORAL ONCE
Status: COMPLETED | OUTPATIENT
Start: 2023-04-13 | End: 2023-04-13

## 2023-04-13 RX ORDER — SODIUM CHLORIDE, SODIUM LACTATE, POTASSIUM CHLORIDE, CALCIUM CHLORIDE 600; 310; 30; 20 MG/100ML; MG/100ML; MG/100ML; MG/100ML
INJECTION, SOLUTION INTRAVENOUS CONTINUOUS
Status: DISCONTINUED | OUTPATIENT
Start: 2023-04-13 | End: 2023-04-13 | Stop reason: HOSPADM

## 2023-04-13 RX ORDER — HYDROMORPHONE HYDROCHLORIDE 1 MG/ML
0.4 INJECTION, SOLUTION INTRAMUSCULAR; INTRAVENOUS; SUBCUTANEOUS
Status: DISCONTINUED | OUTPATIENT
Start: 2023-04-13 | End: 2023-04-13 | Stop reason: HOSPADM

## 2023-04-13 RX ORDER — HALOPERIDOL 5 MG/ML
1 INJECTION INTRAMUSCULAR
Status: DISCONTINUED | OUTPATIENT
Start: 2023-04-13 | End: 2023-04-13 | Stop reason: HOSPADM

## 2023-04-13 RX ORDER — ONDANSETRON 2 MG/ML
4 INJECTION INTRAMUSCULAR; INTRAVENOUS
Status: DISCONTINUED | OUTPATIENT
Start: 2023-04-13 | End: 2023-04-13 | Stop reason: HOSPADM

## 2023-04-13 RX ORDER — EPHEDRINE SULFATE 50 MG/ML
INJECTION, SOLUTION INTRAVENOUS PRN
Status: DISCONTINUED | OUTPATIENT
Start: 2023-04-13 | End: 2023-04-13 | Stop reason: SURG

## 2023-04-13 RX ORDER — HYDRALAZINE HYDROCHLORIDE 20 MG/ML
5 INJECTION INTRAMUSCULAR; INTRAVENOUS
Status: DISCONTINUED | OUTPATIENT
Start: 2023-04-13 | End: 2023-04-13 | Stop reason: HOSPADM

## 2023-04-13 RX ORDER — EPHEDRINE SULFATE 50 MG/ML
5 INJECTION, SOLUTION INTRAVENOUS
Status: DISCONTINUED | OUTPATIENT
Start: 2023-04-13 | End: 2023-04-13 | Stop reason: HOSPADM

## 2023-04-13 RX ORDER — OXYCODONE HYDROCHLORIDE 5 MG/1
2.5 TABLET ORAL
Status: DISCONTINUED | OUTPATIENT
Start: 2023-04-13 | End: 2023-04-16 | Stop reason: HOSPADM

## 2023-04-13 RX ORDER — SODIUM CHLORIDE, SODIUM LACTATE, POTASSIUM CHLORIDE, CALCIUM CHLORIDE 600; 310; 30; 20 MG/100ML; MG/100ML; MG/100ML; MG/100ML
INJECTION, SOLUTION INTRAVENOUS CONTINUOUS
Status: ACTIVE | OUTPATIENT
Start: 2023-04-13 | End: 2023-04-13

## 2023-04-13 RX ORDER — MIDAZOLAM HYDROCHLORIDE 1 MG/ML
1 INJECTION INTRAMUSCULAR; INTRAVENOUS
Status: DISCONTINUED | OUTPATIENT
Start: 2023-04-13 | End: 2023-04-13 | Stop reason: HOSPADM

## 2023-04-13 RX ORDER — OXYCODONE HCL 5 MG/5 ML
5 SOLUTION, ORAL ORAL
Status: COMPLETED | OUTPATIENT
Start: 2023-04-13 | End: 2023-04-13

## 2023-04-13 RX ORDER — SODIUM CHLORIDE, SODIUM LACTATE, POTASSIUM CHLORIDE, CALCIUM CHLORIDE 600; 310; 30; 20 MG/100ML; MG/100ML; MG/100ML; MG/100ML
INJECTION, SOLUTION INTRAVENOUS CONTINUOUS
Status: DISCONTINUED | OUTPATIENT
Start: 2023-04-13 | End: 2023-04-15

## 2023-04-13 RX ORDER — BUPIVACAINE HYDROCHLORIDE AND EPINEPHRINE 5; 5 MG/ML; UG/ML
INJECTION, SOLUTION EPIDURAL; INTRACAUDAL; PERINEURAL
Status: DISCONTINUED | OUTPATIENT
Start: 2023-04-13 | End: 2023-04-13 | Stop reason: HOSPADM

## 2023-04-13 RX ORDER — DIPHENHYDRAMINE HYDROCHLORIDE 50 MG/ML
12.5 INJECTION INTRAMUSCULAR; INTRAVENOUS
Status: DISCONTINUED | OUTPATIENT
Start: 2023-04-13 | End: 2023-04-13 | Stop reason: HOSPADM

## 2023-04-13 RX ORDER — SCOLOPAMINE TRANSDERMAL SYSTEM 1 MG/1
1 PATCH, EXTENDED RELEASE TRANSDERMAL
Status: DISCONTINUED | OUTPATIENT
Start: 2023-04-13 | End: 2023-04-16 | Stop reason: HOSPADM

## 2023-04-13 RX ORDER — ROCURONIUM BROMIDE 10 MG/ML
INJECTION, SOLUTION INTRAVENOUS PRN
Status: DISCONTINUED | OUTPATIENT
Start: 2023-04-13 | End: 2023-04-13 | Stop reason: SURG

## 2023-04-13 RX ORDER — MEPERIDINE HYDROCHLORIDE 25 MG/ML
12.5 INJECTION INTRAMUSCULAR; INTRAVENOUS; SUBCUTANEOUS
Status: DISCONTINUED | OUTPATIENT
Start: 2023-04-13 | End: 2023-04-13 | Stop reason: HOSPADM

## 2023-04-13 RX ORDER — HALOPERIDOL 5 MG/ML
1 INJECTION INTRAMUSCULAR EVERY 6 HOURS PRN
Status: DISCONTINUED | OUTPATIENT
Start: 2023-04-13 | End: 2023-04-16 | Stop reason: HOSPADM

## 2023-04-13 RX ORDER — DIPHENHYDRAMINE HYDROCHLORIDE 50 MG/ML
25 INJECTION INTRAMUSCULAR; INTRAVENOUS EVERY 6 HOURS PRN
Status: DISCONTINUED | OUTPATIENT
Start: 2023-04-13 | End: 2023-04-16 | Stop reason: HOSPADM

## 2023-04-13 RX ORDER — HYDROMORPHONE HYDROCHLORIDE 1 MG/ML
0.1 INJECTION, SOLUTION INTRAMUSCULAR; INTRAVENOUS; SUBCUTANEOUS
Status: DISCONTINUED | OUTPATIENT
Start: 2023-04-13 | End: 2023-04-13 | Stop reason: HOSPADM

## 2023-04-13 RX ORDER — OXYCODONE HCL 5 MG/5 ML
10 SOLUTION, ORAL ORAL
Status: COMPLETED | OUTPATIENT
Start: 2023-04-13 | End: 2023-04-13

## 2023-04-13 RX ORDER — ENOXAPARIN SODIUM 100 MG/ML
40 INJECTION SUBCUTANEOUS DAILY
Status: DISCONTINUED | OUTPATIENT
Start: 2023-04-14 | End: 2023-04-16 | Stop reason: HOSPADM

## 2023-04-13 RX ORDER — ALBUTEROL SULFATE 2.5 MG/3ML
2.5 SOLUTION RESPIRATORY (INHALATION)
Status: DISCONTINUED | OUTPATIENT
Start: 2023-04-13 | End: 2023-04-13 | Stop reason: HOSPADM

## 2023-04-13 RX ORDER — CEFAZOLIN SODIUM 1 G/3ML
INJECTION, POWDER, FOR SOLUTION INTRAMUSCULAR; INTRAVENOUS PRN
Status: DISCONTINUED | OUTPATIENT
Start: 2023-04-13 | End: 2023-04-13 | Stop reason: SURG

## 2023-04-13 RX ORDER — SUCCINYLCHOLINE CHLORIDE 20 MG/ML
INJECTION INTRAMUSCULAR; INTRAVENOUS PRN
Status: DISCONTINUED | OUTPATIENT
Start: 2023-04-13 | End: 2023-04-13 | Stop reason: SURG

## 2023-04-13 RX ORDER — ONDANSETRON 2 MG/ML
4 INJECTION INTRAMUSCULAR; INTRAVENOUS EVERY 4 HOURS PRN
Status: DISCONTINUED | OUTPATIENT
Start: 2023-04-13 | End: 2023-04-16 | Stop reason: HOSPADM

## 2023-04-13 RX ORDER — SODIUM CHLORIDE, SODIUM GLUCONATE, SODIUM ACETATE, POTASSIUM CHLORIDE AND MAGNESIUM CHLORIDE 526; 502; 368; 37; 30 MG/100ML; MG/100ML; MG/100ML; MG/100ML; MG/100ML
INJECTION, SOLUTION INTRAVENOUS
Status: DISCONTINUED | OUTPATIENT
Start: 2023-04-13 | End: 2023-04-13 | Stop reason: SURG

## 2023-04-13 RX ORDER — OXYCODONE HCL 10 MG/1
10 TABLET, FILM COATED, EXTENDED RELEASE ORAL ONCE
Status: COMPLETED | OUTPATIENT
Start: 2023-04-13 | End: 2023-04-13

## 2023-04-13 RX ORDER — HYDROMORPHONE HYDROCHLORIDE 1 MG/ML
0.25 INJECTION, SOLUTION INTRAMUSCULAR; INTRAVENOUS; SUBCUTANEOUS
Status: DISCONTINUED | OUTPATIENT
Start: 2023-04-13 | End: 2023-04-16 | Stop reason: HOSPADM

## 2023-04-13 RX ORDER — HYDROMORPHONE HYDROCHLORIDE 2 MG/ML
INJECTION, SOLUTION INTRAMUSCULAR; INTRAVENOUS; SUBCUTANEOUS PRN
Status: DISCONTINUED | OUTPATIENT
Start: 2023-04-13 | End: 2023-04-13 | Stop reason: SURG

## 2023-04-13 RX ORDER — LIDOCAINE HYDROCHLORIDE 20 MG/ML
INJECTION, SOLUTION EPIDURAL; INFILTRATION; INTRACAUDAL; PERINEURAL PRN
Status: DISCONTINUED | OUTPATIENT
Start: 2023-04-13 | End: 2023-04-13 | Stop reason: SURG

## 2023-04-13 RX ORDER — DEXAMETHASONE SODIUM PHOSPHATE 4 MG/ML
INJECTION, SOLUTION INTRA-ARTICULAR; INTRALESIONAL; INTRAMUSCULAR; INTRAVENOUS; SOFT TISSUE PRN
Status: DISCONTINUED | OUTPATIENT
Start: 2023-04-13 | End: 2023-04-13 | Stop reason: SURG

## 2023-04-13 RX ORDER — ACETAMINOPHEN 500 MG
1000 TABLET ORAL EVERY 6 HOURS
Status: DISCONTINUED | OUTPATIENT
Start: 2023-04-13 | End: 2023-04-16 | Stop reason: HOSPADM

## 2023-04-13 RX ADMIN — FENTANYL CITRATE 50 MCG: 50 INJECTION, SOLUTION INTRAMUSCULAR; INTRAVENOUS at 10:28

## 2023-04-13 RX ADMIN — ROCURONIUM BROMIDE 5 MG: 10 INJECTION, SOLUTION INTRAVENOUS at 10:10

## 2023-04-13 RX ADMIN — FENTANYL CITRATE 50 MCG: 50 INJECTION, SOLUTION INTRAMUSCULAR; INTRAVENOUS at 12:43

## 2023-04-13 RX ADMIN — OXYCODONE HYDROCHLORIDE 10 MG: 10 TABLET, FILM COATED, EXTENDED RELEASE ORAL at 09:06

## 2023-04-13 RX ADMIN — ACETAMINOPHEN 1000 MG: 500 TABLET, FILM COATED ORAL at 15:09

## 2023-04-13 RX ADMIN — SODIUM CHLORIDE, POTASSIUM CHLORIDE, SODIUM LACTATE AND CALCIUM CHLORIDE: 600; 310; 30; 20 INJECTION, SOLUTION INTRAVENOUS at 15:10

## 2023-04-13 RX ADMIN — HYDROMORPHONE HYDROCHLORIDE 0.2 MG: 2 INJECTION INTRAMUSCULAR; INTRAVENOUS; SUBCUTANEOUS at 11:35

## 2023-04-13 RX ADMIN — ONDANSETRON 4 MG: 2 INJECTION INTRAMUSCULAR; INTRAVENOUS at 18:25

## 2023-04-13 RX ADMIN — EPHEDRINE SULFATE 5 MG: 50 INJECTION, SOLUTION INTRAVENOUS at 11:18

## 2023-04-13 RX ADMIN — SODIUM CHLORIDE, POTASSIUM CHLORIDE, SODIUM LACTATE AND CALCIUM CHLORIDE: 600; 310; 30; 20 INJECTION, SOLUTION INTRAVENOUS at 09:07

## 2023-04-13 RX ADMIN — CEFAZOLIN 2 G: 330 INJECTION, POWDER, FOR SOLUTION INTRAMUSCULAR; INTRAVENOUS at 10:19

## 2023-04-13 RX ADMIN — OXYCODONE 5 MG: 5 TABLET ORAL at 18:29

## 2023-04-13 RX ADMIN — HYDROMORPHONE HYDROCHLORIDE 0.25 MG: 1 INJECTION, SOLUTION INTRAMUSCULAR; INTRAVENOUS; SUBCUTANEOUS at 21:00

## 2023-04-13 RX ADMIN — FENTANYL CITRATE 50 MCG: 50 INJECTION, SOLUTION INTRAMUSCULAR; INTRAVENOUS at 10:07

## 2023-04-13 RX ADMIN — ACETAMINOPHEN 1000 MG: 500 TABLET, FILM COATED ORAL at 20:55

## 2023-04-13 RX ADMIN — ACETAMINOPHEN 1000 MG: 500 TABLET, FILM COATED ORAL at 09:06

## 2023-04-13 RX ADMIN — ONDANSETRON 4 MG: 2 INJECTION INTRAMUSCULAR; INTRAVENOUS at 12:19

## 2023-04-13 RX ADMIN — OXYCODONE 5 MG: 5 TABLET ORAL at 15:09

## 2023-04-13 RX ADMIN — MIDAZOLAM HYDROCHLORIDE 1 MG: 1 INJECTION, SOLUTION INTRAMUSCULAR; INTRAVENOUS at 10:05

## 2023-04-13 RX ADMIN — OXYCODONE HYDROCHLORIDE 10 MG: 5 SOLUTION ORAL at 12:43

## 2023-04-13 RX ADMIN — SUCCINYLCHOLINE CHLORIDE 40 MG: 20 INJECTION, SOLUTION INTRAMUSCULAR; INTRAVENOUS; PARENTERAL at 10:10

## 2023-04-13 RX ADMIN — EPHEDRINE SULFATE 5 MG: 50 INJECTION, SOLUTION INTRAVENOUS at 10:15

## 2023-04-13 RX ADMIN — FENTANYL CITRATE 50 MCG: 50 INJECTION, SOLUTION INTRAMUSCULAR; INTRAVENOUS at 13:06

## 2023-04-13 RX ADMIN — HYDROMORPHONE HYDROCHLORIDE 0.4 MG: 2 INJECTION INTRAMUSCULAR; INTRAVENOUS; SUBCUTANEOUS at 10:24

## 2023-04-13 RX ADMIN — ROCURONIUM BROMIDE 5 MG: 10 INJECTION, SOLUTION INTRAVENOUS at 10:17

## 2023-04-13 RX ADMIN — PROPOFOL 150 MG: 10 INJECTION, EMULSION INTRAVENOUS at 10:10

## 2023-04-13 RX ADMIN — SODIUM CHLORIDE, SODIUM GLUCONATE, SODIUM ACETATE, POTASSIUM CHLORIDE AND MAGNESIUM CHLORIDE: 526; 502; 368; 37; 30 INJECTION, SOLUTION INTRAVENOUS at 11:10

## 2023-04-13 RX ADMIN — FENTANYL CITRATE 50 MCG: 50 INJECTION, SOLUTION INTRAMUSCULAR; INTRAVENOUS at 10:36

## 2023-04-13 RX ADMIN — DEXAMETHASONE SODIUM PHOSPHATE 4 MG: 4 INJECTION, SOLUTION INTRA-ARTICULAR; INTRALESIONAL; INTRAMUSCULAR; INTRAVENOUS; SOFT TISSUE at 10:11

## 2023-04-13 RX ADMIN — LIDOCAINE HYDROCHLORIDE 60 MG: 20 INJECTION, SOLUTION EPIDURAL; INFILTRATION; INTRACAUDAL at 10:10

## 2023-04-13 RX ADMIN — SODIUM CHLORIDE, POTASSIUM CHLORIDE, SODIUM LACTATE AND CALCIUM CHLORIDE: 600; 310; 30; 20 INJECTION, SOLUTION INTRAVENOUS at 21:39

## 2023-04-13 ASSESSMENT — FIBROSIS 4 INDEX: FIB4 SCORE: 1.17

## 2023-04-13 ASSESSMENT — LIFESTYLE VARIABLES
ON A TYPICAL DAY WHEN YOU DRINK ALCOHOL HOW MANY DRINKS DO YOU HAVE: 1
ALCOHOL_USE: YES
EVER FELT BAD OR GUILTY ABOUT YOUR DRINKING: NO
TOTAL SCORE: 0
CONSUMPTION TOTAL: NEGATIVE
HOW MANY TIMES IN THE PAST YEAR HAVE YOU HAD 5 OR MORE DRINKS IN A DAY: 0
AVERAGE NUMBER OF DAYS PER WEEK YOU HAVE A DRINK CONTAINING ALCOHOL: 0
DOES PATIENT WANT TO STOP DRINKING: NO
TOTAL SCORE: 0
HAVE PEOPLE ANNOYED YOU BY CRITICIZING YOUR DRINKING: NO
TOTAL SCORE: 0
HAVE YOU EVER FELT YOU SHOULD CUT DOWN ON YOUR DRINKING: NO
EVER HAD A DRINK FIRST THING IN THE MORNING TO STEADY YOUR NERVES TO GET RID OF A HANGOVER: NO

## 2023-04-13 ASSESSMENT — PAIN DESCRIPTION - PAIN TYPE
TYPE: ACUTE PAIN;SURGICAL PAIN
TYPE: SURGICAL PAIN
TYPE: ACUTE PAIN
TYPE: ACUTE PAIN;SURGICAL PAIN

## 2023-04-13 ASSESSMENT — PATIENT HEALTH QUESTIONNAIRE - PHQ9
SUM OF ALL RESPONSES TO PHQ9 QUESTIONS 1 AND 2: 0
1. LITTLE INTEREST OR PLEASURE IN DOING THINGS: NOT AT ALL
2. FEELING DOWN, DEPRESSED, IRRITABLE, OR HOPELESS: NOT AT ALL

## 2023-04-13 ASSESSMENT — COGNITIVE AND FUNCTIONAL STATUS - GENERAL
HELP NEEDED FOR BATHING: A LITTLE
TURNING FROM BACK TO SIDE WHILE IN FLAT BAD: A LITTLE
CLIMB 3 TO 5 STEPS WITH RAILING: A LITTLE
MOVING FROM LYING ON BACK TO SITTING ON SIDE OF FLAT BED: A LITTLE
DAILY ACTIVITIY SCORE: 23
WALKING IN HOSPITAL ROOM: A LITTLE
SUGGESTED CMS G CODE MODIFIER DAILY ACTIVITY: CI
SUGGESTED CMS G CODE MODIFIER MOBILITY: CK
STANDING UP FROM CHAIR USING ARMS: A LITTLE
MOBILITY SCORE: 19

## 2023-04-13 ASSESSMENT — COPD QUESTIONNAIRES
HAVE YOU SMOKED AT LEAST 100 CIGARETTES IN YOUR ENTIRE LIFE: NO/DON'T KNOW
COPD SCREENING SCORE: 0
DO YOU EVER COUGH UP ANY MUCUS OR PHLEGM?: NO/ONLY WITH OCCASIONAL COLDS OR INFECTIONS
DURING THE PAST 4 WEEKS HOW MUCH DID YOU FEEL SHORT OF BREATH: NONE/LITTLE OF THE TIME

## 2023-04-13 ASSESSMENT — PAIN SCALES - GENERAL: PAIN_LEVEL: 0

## 2023-04-13 NOTE — ANESTHESIA POSTPROCEDURE EVALUATION
Patient: Madison Allen    Procedure Summary     Date: 04/13/23 Room / Location: [place] [address] / SURGERY [facility]    Anesthesia Start: 1006 Anesthesia Stop: 1231    Procedures:       RADICAL RESECTION OF RIGHT GLUTEAL MASS (Buttocks)      ULTRASOUND GUIDANCE (Buttocks) Diagnosis: (MYXOID LIPOSARCOMA)    Surgeons: Artie Rojas M.D. Responsible Provider: Emmanuel Alan M.D.    Anesthesia Type: general ASA Status: 2          Final Anesthesia Type: general  Last vitals  BP   Blood Pressure: 100/53    Temp   36.1 °C (96.9 °F)    Pulse   90   Resp   16    SpO2   100 %      Anesthesia Post Evaluation    Patient location during evaluation: PACU  Patient participation: complete - patient participated  Level of consciousness: awake and alert  Pain score: 0    Airway patency: patent  Anesthetic complications: no  Cardiovascular status: hemodynamically stable  Respiratory status: acceptable  Hydration status: euvolemic    PONV: none          No notable events documented.     Nurse Pain Score: 0 (NPRS)

## 2023-04-13 NOTE — OR NURSING
1226 Pt arrived to PACU with Anesthesiologist and OR RN. AAOx4. Even, unlabored respirations. VSS. Denies pain. Denies nausea. Right buttock dressing CDI with ZOYA and CAMRYN drain.     1315 POC update given to Kaity, sister, over the phone. All questions answered.     1325 Pt meets floor criteria. Report called to MICHEAL Patterson.     1400 Pt transported to T402 with transport. Chart, belongings, and full oxygen tank with patient.

## 2023-04-13 NOTE — ANESTHESIA PREPROCEDURE EVALUATION
Case: [id number] Date/Time: 04/13/23 0945    Procedures:       RADICAL RESECTION OF RIGHT GLUTEAL MASS, POSSIBLE WOUND VAC PLACEMENT, INTRAOPERATIVE ULTRASOUND      APPLICATION OR REPLACEMENT, WOUND VAC      ULTRASOUND GUIDANCE    Pre-op diagnosis: MYXOID LIPOSARCOMA    [facility]    Surgeons: Artie Rojas M.D.      41 yo female with Right gluteal myxoid sarcoma    P Med Hx:  See above  Otherwise healthy    P Surg Hx:  Tamie 2006  No reports of problems with previous anesthesia    Non-smoker  occ social EtOH use reported    NPO    Relevant Problems   CARDIAC   (positive) Single umbilical artery- start NST at 32 weeks; ff-up fetal growth       Physical Exam    Airway   Mallampati: II  TM distance: >3 FB  Neck ROM: full       Cardiovascular - normal exam  Rhythm: regular  Rate: normal  (-) murmur     Dental - normal exam           Pulmonary - normal exam  Breath sounds clear to auscultation     Abdominal    Neurological - normal exam                 Anesthesia Plan    ASA 2       Plan - general       Airway plan will be ETT          Induction: intravenous    Postoperative Plan: Postoperative administration of opioids is intended.    Pertinent diagnostic labs and testing reviewed    Informed Consent:    Anesthetic plan and risks discussed with patient.    Use of blood products discussed with: patient whom consented to blood products.

## 2023-04-13 NOTE — ANESTHESIA TIME REPORT
Anesthesia Start and Stop Event Times     Date Time Event    4/13/2023 0951 Ready for Procedure     1006 Anesthesia Start     1231 Anesthesia Stop        Responsible Staff  04/13/23    Name Role Begin End    Emmanuel Alan M.D. Anesth 1006 1231        Overtime Reason:  no overtime (within assigned shift)    Comments:                                                      
Yes

## 2023-04-13 NOTE — CARE PLAN
The patient is Watcher - Medium risk of patient condition declining or worsening    Shift Goals  Clinical Goals: pain control, monitor CAMRYN drain    Progress made toward(s) clinical / shift goals:  Pain managed per MAR. Patient educated on non-pharmacological. Pain managed per MAR.     Problem: Pain - Standard  Goal: Alleviation of pain or a reduction in pain to the patient’s comfort goal  4/13/2023 1544 by Edyta Causey R.N.  Outcome: Progressing  4/13/2023 1544 by Edyta Causey R.N.  Outcome: Progressing     Problem: Knowledge Deficit - Standard  Goal: Patient and family/care givers will demonstrate understanding of plan of care, disease process/condition, diagnostic tests and medications  4/13/2023 1544 by Edyta Causey R.N.  Outcome: Progressing  4/13/2023 1544 by Edyta Causey R.N.  Outcome: Progressing

## 2023-04-13 NOTE — ANESTHESIA PROCEDURE NOTES
Airway    Date/Time: 4/13/2023 10:10 AM  Performed by: Emmanuel Alan M.D.  Authorized by: Emmanuel Alan M.D.     Location:  OR  Urgency:  Elective  Indications for Airway Management:  Anesthesia      Spontaneous Ventilation: absent    Sedation Level:  Deep  Preoxygenated: Yes    Patient Position:  Sniffing  Mask Difficulty Assessment:  1 - vent by mask  Final Airway Type:  Endotracheal airway  Final Endotracheal Airway:  ETT  Cuffed: Yes    Technique Used for Successful ETT Placement:  Direct laryngoscopy    Insertion Site:  Oral  Blade Type:  Chandana  Laryngoscope Blade/Videolaryngoscope Blade Size:  3  ETT Size (mm):  7.0  Measured from:  Lips  ETT to Lips (cm):  21  Placement Verified by: auscultation and capnometry    Cormack-Lehane Classification:  Grade I - full view of glottis  Number of Attempts at Approach:  1

## 2023-04-13 NOTE — PROGRESS NOTES
"Patient admitted to room T402 via transport in [facility] from PACU at 1415.  Pt /62   Pulse 74   Temp 36.1 °C (97 °F) (Temporal)   Resp 16   Ht 1.549 m (5' 1\")   Wt 64.5 kg (142 lb 3.2 oz)   SpO2 99%   BMI 26.87 kg/m²    Patient reports pain at 8 on a scale of 0-10. Educated patient regarding pharmacologic and non pharmacologic modalities for pain management. Oriented to room call light and smoking policy.  Reviewed plan of care (equipment, incentive spirometer, sequential compression devices, medications, activity, diet, fall precautions, skin care, and pain) with patient and family. Welcome packet given and reviewed with patient, all questions answered. Education provided on oral hygiene program.    A&Ox4. Denies CP/SOB.  Denies N/V.  + void. Last BM PTA.  R buttock incision with dressing in place, CAMRYN to bulb suction and ZOYA drain, CDI.  All needs met at this time. Call light within reach. Pt calls appropriately. Bed low and locked, non skid socks in place. Hourly rounding in place.   "

## 2023-04-14 LAB
ALBUMIN SERPL BCP-MCNC: 3.4 G/DL (ref 3.2–4.9)
ALBUMIN/GLOB SERPL: 1.3 G/DL
ALP SERPL-CCNC: 81 U/L (ref 30–99)
ALT SERPL-CCNC: 91 U/L (ref 2–50)
ANION GAP SERPL CALC-SCNC: 8 MMOL/L (ref 7–16)
AST SERPL-CCNC: 93 U/L (ref 12–45)
BILIRUB SERPL-MCNC: 0.4 MG/DL (ref 0.1–1.5)
BUN SERPL-MCNC: 12 MG/DL (ref 8–22)
CALCIUM ALBUM COR SERPL-MCNC: 8.9 MG/DL (ref 8.5–10.5)
CALCIUM SERPL-MCNC: 8.4 MG/DL (ref 8.5–10.5)
CHLORIDE SERPL-SCNC: 107 MMOL/L (ref 96–112)
CO2 SERPL-SCNC: 24 MMOL/L (ref 20–33)
CREAT SERPL-MCNC: 0.55 MG/DL (ref 0.5–1.4)
ERYTHROCYTE [DISTWIDTH] IN BLOOD BY AUTOMATED COUNT: 42.1 FL (ref 35.9–50)
GFR SERPLBLD CREATININE-BSD FMLA CKD-EPI: 116 ML/MIN/1.73 M 2
GLOBULIN SER CALC-MCNC: 2.7 G/DL (ref 1.9–3.5)
GLUCOSE SERPL-MCNC: 107 MG/DL (ref 65–99)
HCT VFR BLD AUTO: 33.3 % (ref 37–47)
HGB BLD-MCNC: 10.5 G/DL (ref 12–16)
MCH RBC QN AUTO: 25.6 PG (ref 27–33)
MCHC RBC AUTO-ENTMCNC: 31.5 G/DL (ref 33.6–35)
MCV RBC AUTO: 81.2 FL (ref 81.4–97.8)
PLATELET # BLD AUTO: 147 K/UL (ref 164–446)
PMV BLD AUTO: 10.8 FL (ref 9–12.9)
POTASSIUM SERPL-SCNC: 3.9 MMOL/L (ref 3.6–5.5)
PROT SERPL-MCNC: 6.1 G/DL (ref 6–8.2)
RBC # BLD AUTO: 4.1 M/UL (ref 4.2–5.4)
SODIUM SERPL-SCNC: 139 MMOL/L (ref 135–145)
WBC # BLD AUTO: 5.5 K/UL (ref 4.8–10.8)

## 2023-04-14 PROCEDURE — 97535 SELF CARE MNGMENT TRAINING: CPT

## 2023-04-14 PROCEDURE — 97162 PT EVAL MOD COMPLEX 30 MIN: CPT

## 2023-04-14 PROCEDURE — 36415 COLL VENOUS BLD VENIPUNCTURE: CPT

## 2023-04-14 PROCEDURE — 700105 HCHG RX REV CODE 258: Performed by: SURGERY

## 2023-04-14 PROCEDURE — 700102 HCHG RX REV CODE 250 W/ 637 OVERRIDE(OP): Performed by: SURGERY

## 2023-04-14 PROCEDURE — 770001 HCHG ROOM/CARE - MED/SURG/GYN PRIV*

## 2023-04-14 PROCEDURE — 85027 COMPLETE CBC AUTOMATED: CPT

## 2023-04-14 PROCEDURE — 80053 COMPREHEN METABOLIC PANEL: CPT

## 2023-04-14 PROCEDURE — 700111 HCHG RX REV CODE 636 W/ 250 OVERRIDE (IP): Performed by: SURGERY

## 2023-04-14 PROCEDURE — A9270 NON-COVERED ITEM OR SERVICE: HCPCS | Performed by: SURGERY

## 2023-04-14 PROCEDURE — 97165 OT EVAL LOW COMPLEX 30 MIN: CPT

## 2023-04-14 RX ADMIN — SODIUM CHLORIDE, POTASSIUM CHLORIDE, SODIUM LACTATE AND CALCIUM CHLORIDE: 600; 310; 30; 20 INJECTION, SOLUTION INTRAVENOUS at 05:08

## 2023-04-14 RX ADMIN — OXYCODONE 5 MG: 5 TABLET ORAL at 14:26

## 2023-04-14 RX ADMIN — ACETAMINOPHEN 1000 MG: 500 TABLET, FILM COATED ORAL at 14:25

## 2023-04-14 RX ADMIN — ACETAMINOPHEN 1000 MG: 500 TABLET, FILM COATED ORAL at 09:12

## 2023-04-14 RX ADMIN — OXYCODONE 5 MG: 5 TABLET ORAL at 00:50

## 2023-04-14 RX ADMIN — OXYCODONE 5 MG: 5 TABLET ORAL at 09:12

## 2023-04-14 RX ADMIN — ACETAMINOPHEN 1000 MG: 500 TABLET, FILM COATED ORAL at 20:48

## 2023-04-14 RX ADMIN — OXYCODONE 5 MG: 5 TABLET ORAL at 05:05

## 2023-04-14 RX ADMIN — ENOXAPARIN SODIUM 40 MG: 40 INJECTION SUBCUTANEOUS at 09:12

## 2023-04-14 RX ADMIN — OXYCODONE 5 MG: 5 TABLET ORAL at 20:49

## 2023-04-14 ASSESSMENT — ENCOUNTER SYMPTOMS
ABDOMINAL PAIN: 0
CHILLS: 0
FEVER: 0
COUGH: 0

## 2023-04-14 ASSESSMENT — PAIN DESCRIPTION - PAIN TYPE
TYPE: ACUTE PAIN;SURGICAL PAIN
TYPE: SURGICAL PAIN;ACUTE PAIN
TYPE: ACUTE PAIN;SURGICAL PAIN

## 2023-04-14 ASSESSMENT — COGNITIVE AND FUNCTIONAL STATUS - GENERAL
DAILY ACTIVITIY SCORE: 20
DRESSING REGULAR LOWER BODY CLOTHING: A LOT
MOBILITY SCORE: 22
SUGGESTED CMS G CODE MODIFIER MOBILITY: CJ
SUGGESTED CMS G CODE MODIFIER DAILY ACTIVITY: CJ
TOILETING: A LITTLE
TURNING FROM BACK TO SIDE WHILE IN FLAT BAD: A LITTLE
STANDING UP FROM CHAIR USING ARMS: A LITTLE
HELP NEEDED FOR BATHING: A LITTLE

## 2023-04-14 ASSESSMENT — GAIT ASSESSMENTS
DEVIATION: BRADYKINETIC
DISTANCE (FEET): 25
GAIT LEVEL OF ASSIST: STANDBY ASSIST
ASSISTIVE DEVICE: FRONT WHEEL WALKER

## 2023-04-14 ASSESSMENT — ACTIVITIES OF DAILY LIVING (ADL): TOILETING: INDEPENDENT

## 2023-04-14 NOTE — OP REPORT
Date of Operation: 4/13/2023    Preoperative Diagnosis: Myxoid liposarcoma of the right gluteus denise     Postoperative Diagnosis: Same    Operative Procedure:    1.  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  Intraoperative ultrasound  3.  Complex layered closure    Intent of Operation:  Curative      Surgeon: Artie Rojas MD    Assisting Surgeon: Guillermo Petersen MD. The indication for a surgical assistant in this surgery were indicated due to the complexity of the procedure.  Their role included aiding in the incision, retraction, holding of devices and closure of wounds.      Anesthesia: General endotracheal     Estimated Blood Loss: 20 cc    Specimens: Right gluteal tumor    Drains: CAMRYN drain x1    Findings: Approximately 10 x 10 cm myxoid liposarcoma within the gluteus denise muscle.  The tumor itself abutted the anterior fascia of the gluteus denise as well as the attachment of the gluteus denise at the level of the femur.       Counts: Sponge, needle, and instrument counts were reported correct at the conclusion of the operation x2.     Indication: A very pleasant 42-year-old female who noted a growing right gluteal mass in the fall 2022.  It had grown quite rapidly over the course of a couple months.  This was biopsied and was consistent with a myxoid liposarcoma.  Given its size on initial imaging she underwent neoadjuvant radiation therapy.  She had a significant response in the tumor where it shrunk to about half size.  Following this I discussed radical resection of this tumor with her.  The risk benefits and alternatives were explained in detail to the patient in clinic and he can see this discussion in my prior clinic note.  All of her questions were answered and she was in agreement to proceed.      DESCRIPTION OF PROCEDURE   The patient was brought into the operating room underwent general endotracheal anesthesia on the stretcher and then was rolled into the  prone position ensuring that all pressure points were padded.  Venodyne's were placed on the lower extremity.  She received preoperative antibiotics.  The right gluteal region was prepped and draped in a normal sterile fashion.  A timeout was performed.    Initially an ultrasound was used to kim out the borders of the tumor.  Following this an incision was made along the lateral aspect of the right gluteal denise.  The soft tissue was dissected down using Bovie electrocautery.  The skin and subcutaneous fat was raised from the gluteus denise fascia from lateral to medial all the way to the point of the sciatic notch.  At this point the fascia of the gluteus denise was completely exposed.  The ultrasound was again used to kim out the borders of the tumor.  The posterior fascia of the gluteus denise was then incised with a margin of approximately 5 cm however along its medial border the margin was closer to 3 cm given its approaching at the sciatic notch.  Next the dissection started at the superior aspect of the gluteus denise muscle was divided using electrocautery and the thunder beat device.  This was carried circumferentially around the entire aspect of the tumor.  Dissection of the muscle continued at the superior aspect until he reached the deep fascia of the gluteus denise and this fascia was incised and raised the deep aspect of the muscle.  Medially dissection was taken all the way to the sciatic notch carried down to include the fascia of the gluteus denise.  This point the gluteus medius and piriformis muscles were identified and preserved.  The dissection was carried inferiorly the biceps femoris fascia was identified and this was taken off of the muscle to include in the resected specimen as a biologic margin.  The sciatic nerve was identified and preserved.  The tumor was largely raised up out of the surgical cavity at this point with the inferior and lateral attachments still holding the tumor  in place.  Dissection was then carried down along the lateral aspect of the gluteus denise.  Near the inferior border of the tumor I did approach the femoral attachment of the gluteus denise.  Dissection was carried all the way down to the level of the femur in order to obtain a negative margin.  At this point the only attachment remaining was the inferior aspect of the gluteus denise.  This was dissected free well distal to the tumor and the specimen was removed from the surgical field.  It was marked and passed off the field for permanent pathology.   Next the wound was inspected and there was no evidence of remaining tumor.  2 clips were placed at the inferior lateral aspect overlying the femur in the area where we felt the margin might be close.  Next the wound was copiously irrigated and hemostasis was ensured.  The superior aspect of the gluteus denise fascia was pulled together with 2-0 Vicryl sutures to close down the size of the cavity.  Inferiorly there was too much tension to be able to do this.  Next a 19 Bel[place]ian drain was placed in the surgical cavity.  Using 2-0 Vicryl sutures the deep subcutaneous fat was brought together to close down the cavity.  2-0 Vicryl's were used to bring the edges of the dermis together.  Next 2-0 nylon were used to close the dermis and epidermis in a horizontal mattress fashion.  A parrish dressing was placed over the incision.       The patient was then awakened and transferred to PACU in stable condition.     Disposition: Floor for routine postop care    Artie Rojas MD

## 2023-04-14 NOTE — CARE PLAN
The patient is Stable - Low risk of patient condition declining or worsening    Shift Goals  Clinical Goals: pain control, monitor drains  Patient Goals: pain control, rest    Progress made toward(s) clinical / shift goals:    Problem: Pain - Standard  Goal: Alleviation of pain or a reduction in pain to the patient’s comfort goal  Outcome: Progressing     Problem: Knowledge Deficit - Standard  Goal: Patient and family/care givers will demonstrate understanding of plan of care, disease process/condition, diagnostic tests and medications  Outcome: Progressing       Patient is not progressing towards the following goals:

## 2023-04-14 NOTE — THERAPY
Physical Therapy   Initial Evaluation     Patient Name: Madison Allen  Age:  42 y.o., Sex:  female  Medical Record #: [id number]  Today's Date: 4/14/2023     Precautions  Precautions: Fall Risk  Comments: Jose drain    Assessment  Patient is 42 y.o. 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 Pt seen for PT eval in bed and was agreeable to therapy session. Pt performed bed mobility w/ SBA but unwilling to sit directly on R hip. Pt able to sit <> stand w/ SPV and FWW. Pt amb 25 ft x2 w/ FWW, SBA, and antalgic gait 2/2 pain. Pt ascended/ descended step with FWW and 'up with the good and down with the bad' for stair negotiation. Pt educated on side-lying or standing hip extension exercises to help increase strength and instructed to perform 3-4 sets of 10. Anticipate pt has no further acute therapy needs, however recommend HHPT to address further strength and fxnl deficits.     Plan    Physical Therapy Initial Treatment Plan   Duration: Evaluation only    DC Equipment Recommendations: Front-Wheel Walker  Discharge Recommendations:  Recommend home health for continued physical therapy services  Objective     04/14/23 0941    Services   Is patient using  services for this encounter? Yes   Language Interpreted Tamazight    Name Kwan [id number]    Mode iPad   Refusal signed by patient? No   Content of Interpretation (select all) History/Visit information;Patient Education  (PT eval)   Initial Contact Note    Initial Contact Note Order Received and Verified, Evaluation Only - Patient Does Not Require Further Acute Physical Therapy at this Time.  However, May Benefit from Post Acute Therapy for Higher Level Functional Deficits.   Precautions   Precautions Fall Risk   Comments Jose drain   Vitals   O2 (LPM) 0   O2 Delivery Device None - Room Air   Pain   Pain Scales 0 to 10 Scale    Intervention Ambulation / Increased Activity   Pain 0 - 10  Group   Comfort Goal Comfort with Movement;Perform Activity   Therapist Pain Assessment Post Activity;Nurse Notified   Prior Living Situation   Prior Services None   Housing / Facility 1 Story House   Steps Into Home 1   Equipment Owned None   Lives with - Patient's Self Care Capacity Sibling   Comments Pt reports living with Sister who is pregnant but able to assist PRN   Prior Level of Functional Mobility   Bed Mobility Independent   Transfer Status Independent   Ambulation Independent   Ambulation Distance Community   Assistive Devices Used None   Stairs Independent   Comments PT reports working at a [employer] where she stands and lifts 25 lb boxes; prior to sx was driving and independent with ADLs and IADLs   History of Falls   History of Falls No   Cognition    Cognition / Consciousness WDL   Level of Consciousness Alert   Comments Very pleasant and cooperative during tx session; willing to participate   Active ROM Lower Body    Active ROM Lower Body  X   Comments limited hip ext 2/2 sx   Strength Lower Body   Lower Body Strength  X   Comments unable to formally test LE strength 2/2 no sitting tolerance; limited glute strength on R side   Other Treatments   Other Treatments Provided Pt provided with hip ext exercises in side lying and standing (repeat 3-4 sets of 10)   Balance Assessment   Sitting Balance (Static) Fair   Sitting Balance (Dynamic) Fair -   Standing Balance (Static) Fair +   Standing Balance (Dynamic) Fair   Weight Shift Sitting Fair   Weight Shift Standing Good   Comments w/ FWW; painful to sit   Bed Mobility    Supine to Sit Standby Assist   Scooting Supervised   Comments HOB elevated   Gait Analysis   Gait Level Of Assist Standby Assist   Assistive Device Front Wheel Walker   Distance (Feet) 25   # of Times Distance was Traveled 2   Deviation Bradykinetic   # of Stairs Climbed 1   Level of Assist with Stairs Contact Guard Assist   Weight Bearing Status No precautions   Comments w/ FWW    Functional Mobility   Sit to Stand Supervised   Bed, Chair, Wheelchair Transfer Supervised   Transfer Method Stand Step   Comments w/ FWW; antalgic gait d/t pain req more time   How much difficulty does the patient currently have...   Turning over in bed (including adjusting bedclothes, sheets and blankets)? 3   Sitting down on and standing up from a chair with arms (e.g., wheelchair, bedside commode, etc.) 4   Moving from lying on back to sitting on the side of the bed? 4   How much help from another person does the patient currently need...   Moving to and from a bed to a chair (including a wheelchair)? 3   Need to walk in a hospital room? 4   Climbing 3-5 steps with a railing? 4   6 clicks Mobility Score 22   Activity Tolerance   Sitting in Chair post-session   Sitting Edge of Bed <1 min   Standing 10 min   Comments limited by fatigue and pain   Edema / Skin Assessment   Edema / Skin  Not Assessed   Comments CAMRYN drain in place pre/post-session   Education Group   Education Provided Role of Physical Therapist;Stair Training;Use of Assistive Device;Exercises - Standing   Role of Physical Therapist Patient Response Patient;Acceptance;Explanation;Verbal Demonstration   Stair Training Patient Response Patient;Acceptance;Explanation;Verbal Demonstration;Action Demonstration   Use of Assistive Device Patient Response Patient;Acceptance;Explanation;Verbal Demonstration;Action Demonstration   Exercise - Standing Patient Response Patient;Acceptance;Explanation;Demonstration;Verbal Demonstration;Action Demonstration   Physical Therapy Initial Treatment Plan    Duration Evaluation only   Anticipated Discharge Equipment and Recommendations   DC Equipment Recommendations Front-Wheel Walker   Discharge Recommendations Recommend home health for continued physical therapy services   Interdisciplinary Plan of Care Collaboration   IDT Collaboration with  Nursing;Occupational Therapist   Patient Position at End of Therapy  Seated;Tray Table within Reach;Call Light within Reach;Phone within Reach;Family / Friend in Room   Collaboration Comments RN updated   Session Information   Date / Session Number  4/14- 1 (Eval only)

## 2023-04-14 NOTE — PROGRESS NOTES
Pt is A&O 4  Pain + medicated per MAR   - nausea  Tolerating a full liquid diet   Incision + to R outer buttock   + Drains R hip CAMRYN  + Voids  + flatus  - BM  Up x1  SCD's on  Bed alarm off, pt no fall risk per johanna basurto  Reviewed plan of care with patient, bed in lowest position and locked, pt resting comfortably now, call light within reach, all needs met at this time. Interventions will be executed per plan of care

## 2023-04-14 NOTE — PROGRESS NOTES
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  Intraoperative ultrasound  3.  Complex layered closure    Hospital Course  POD # 1     Interval Problem Update  No acute events  Right leg/thigh painful and tender, managed on PO/PRN meds  CAMRYN drain with 140 mL serosang out  Single episode of emesis yesterday, feels better today  On full liquid diet, minimal intake so far, low appetite    Problem List  Active Problems:    * No active hospital problems. *  Resolved Problems:    * No resolved hospital problems. *     Subjective  Review of Systems   Constitutional:  Negative for chills and fever.   Respiratory:  Negative for cough.    Cardiovascular:  Negative for chest pain.   Gastrointestinal:  Negative for abdominal pain.        Single episode emesis yesterday  Low appetite   Musculoskeletal:         Painful right upper leg and thigh   All other systems reviewed and are negative.      Objective  Temp:  [36.1 °C (96.9 °F)-37 °C (98.6 °F)] 36.8 °C (98.2 °F)  Pulse:  [67-91] 68  Resp:  [16-17] 17  BP: ()/(52-67) 97/57  SpO2:  [93 %-100 %] 93 %      Physical Exam  Vitals and nursing note reviewed.   Constitutional:       General: She is not in acute distress.     Appearance: Normal appearance. She is ill-appearing.   HENT:      Head: Normocephalic and atraumatic.      Nose: Nose normal.      Mouth/Throat:      Pharynx: Oropharynx is clear.   Eyes:      Conjunctiva/sclera: Conjunctivae normal.   Cardiovascular:      Rate and Rhythm: Normal rate.   Pulmonary:      Effort: Pulmonary effort is normal. No respiratory distress.   Musculoskeletal:      Comments: Right upper thigh/leg with intact surgical ZOYA dressing, mild tenderness  Intact distal sensation and circulation and strength  CAMRYN drain with 140  mL serosang output   Skin:     General: Skin is warm and dry.   Neurological:      Mental Status: She is alert and oriented to person, place, and time.   Psychiatric:         Mood and Affect: Mood normal.         Behavior: Behavior normal.        Fluids  Intake/Output Summary (Last 24 hours) at 4/14/2023 0755  Last data filed at 4/14/2023 0332  Gross per 24 hour   Intake 1400 ml   Output 655 ml   Net 745 ml         Labs  Lab Results   Component Value Date/Time    SODIUM 139 04/14/2023 03:51 AM    POTASSIUM 3.9 04/14/2023 03:51 AM    CHLORIDE 107 04/14/2023 03:51 AM    CO2 24 04/14/2023 03:51 AM    GLUCOSE 107 (H) 04/14/2023 03:51 AM    BUN 12 04/14/2023 03:51 AM    CREATININE 0.55 04/14/2023 03:51 AM    CREATININE 0.8 09/06/2007 10:25 AM         No results found for: PROTHROMBTM, INR      Lab Results   Component Value Date/Time    WBC 5.5 04/14/2023 03:51 AM    RBC 4.10 (L) 04/14/2023 03:51 AM    HEMOGLOBIN 10.5 (L) 04/14/2023 03:51 AM    HEMATOCRIT 33.3 (L) 04/14/2023 03:51 AM    MCV 81.2 (L) 04/14/2023 03:51 AM    MCH 25.6 (L) 04/14/2023 03:51 AM    MCHC 31.5 (L) 04/14/2023 03:51 AM    MPV 10.8 04/14/2023 03:51 AM    NEUTSPOLYS 58.10 11/01/2022 12:29 PM    LYMPHOCYTES 30.00 11/01/2022 12:29 PM    MONOCYTES 6.50 11/01/2022 12:29 PM    EOSINOPHILS 4.10 11/01/2022 12:29 PM    BASOPHILS 0.80 11/01/2022 12:29 PM         Recent Labs     04/14/23  0351   ASTSGOT 93*   ALTSGPT 91*   TBILIRUBIN 0.4   GLOBULIN 2.7        Assessment/Plan  Patient is a 42F with newly identified fast-growing right posterior thigh myxoid liposarcoma.  Completed neoadjuvant radiation therapy of 50 Hernandez in 25 fractions. Now s/p radical resection of right thigh tumor.    Pain controlled on PO/PRN  Advance to Regular diet  Priority PT eval and treat - in context of resection of large part of right gluteus denise  Reduce IV rate  OOB after PT eval        Tobi Beauchamp MS, MPH, NP  Surgical Oncology  April 14, 2023, 7:55 AM

## 2023-04-14 NOTE — OR SURGEON
Immediate Post OP Note    PreOp Diagnosis: Right gluteal myxoid liposarcoma       PostOp Diagnosis: same      Procedure(s):  RADICAL RESECTION OF RIGHT GLUTEAL MASS - Wound Class: Clean  ULTRASOUND GUIDANCE - Wound Class: Clean    Surgeon(s):  JUAN Gamez M.D.    Anesthesiologist/Type of Anesthesia:  Anesthesiologist: Emmanuel Alan M.D./General    Surgical Staff:  Circulator: Doreen Trevizo R.N.  Relief Circulator: Moni Oh R.N.  Scrub Person: Vandana Maier    Specimens removed if any:  ID Type Source Tests Collected by Time Destination   A : Sarcoma Other Other PATHOLOGY SPECIMEN Artie Rojas M.D. 4/13/2023 11:48 AM        Estimated Blood Loss: 20 cc    Findings: Myxoid liposarcoma involving most of gluteus denise muscle resected in full    Drains: CAMRYN drain x 1    Complications: none        4/13/2023 10:02 PM Artie Rojas M.D.

## 2023-04-14 NOTE — PROGRESS NOTES
Bedside report received.  Assessment complete.  A&O x 4. Patient calls appropriately.  Patient ambulates without assist.    Patient has 9/10 pain. Pain managed with prescribed medications.  Denies N&V. Tolerating diet.  Surgical incision to Right gluteus, dressing in place, CDI. CAMRYN drain to right hip, sutured in place.  + void  Patient denies SOB.  Patient pleasant with staff and resting in bed.  Review plan with of care with patient. Call light and personal belongings within reach. Hourly rounding in place. All needs met at this time.

## 2023-04-14 NOTE — THERAPY
Occupational Therapy   Initial Evaluation     Patient Name: Madison Allen  Age:  42 y.o., Sex:  female  Medical Record #: [id number]  Today's Date: 4/14/2023     Precautions  Precautions: Fall Risk  Comments: CAMRYN drain    Assessment  Patient is 42 y.o. 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 Completed ADLs/txfs with SPV (max A for LB dressing) and functional ambulation w/FWW and SPV. Educated on padded toilet seat for comfort, adaptive techniques for ADLs, and importance of OOB activity/functional mobility. Reports lives with sister is pregnant, but able to assist PRN 24/7. Pt also has an SO who can intermittently assist. Patient will not be actively followed for occupational therapy services at this time, however may be seen if requested by physician for 1 more visit within 30 days to address any discharge or equipment needs.     Plan    Occupational Therapy Initial Treatment Plan   Duration: Discharge Needs Only    DC Equipment Recommendations: Other (Comments) (padded toilet seat)  Discharge Recommendations: Anticipate that the patient will have no further occupational therapy needs after discharge from the hospital (as [employer] family can assist PRN)      Objective     04/14/23 0917    Services   Is patient using  services for this encounter? Yes   Language Interpreted Belgian    Name Kwan [id number]    Mode iPad   Content of Interpretation (select all) Other  (OT eval)   Prior Living Situation   Prior Services Home-Independent   Housing / Facility 1 Story House   Steps Into Home 1   Bathroom Set up Bathtub / Shower Combination   Equipment Owned None   Lives with - Patient's Self Care Capacity Sibling   Comments Reports lives with sister is pregnant, but able to assist PRN 24/7. 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)   Precautions   Precautions Fall Risk   Comments CAMRYN drain   Vitals   O2 Delivery Device None - Room Air   Pain 0 - 10 Group   Location Buttock   Location Orientation Right   Therapist Pain Assessment Post Activity;During Activity;Nurse Notified  (not quantified; worst in sitting)   Cognition    Cognition / Consciousness WDL   Level of Consciousness Alert   Comments very pleasant and cooperative   Passive ROM Upper Body   Passive ROM Upper Body WDL   Active ROM Upper Body   Active ROM Upper Body  WDL   Strength Upper Body   Upper Body Strength  WDL   Coordination Upper Body   Coordination WDL   Balance Assessment   Sitting Balance (Static) Fair   Sitting Balance (Dynamic) Fair -   Standing Balance (Static) Fair +   Standing Balance (Dynamic) Fair   Weight Shift Sitting Fair   Weight Shift Standing Good   Comments w/FWW; painful to sit   Bed Mobility    Supine to Sit Standby Assist   Sit to Supine   (left up in recliner)   Scooting Supervised   Comments HOB elevated   ADL Assessment   Eating Supervision   Grooming Supervision   Lower Body Dressing Maximal Assist  (socks; reports sister will assist)   Toileting Supervision   Comments Educated on padded toilet seat for comfort, adaptive techniques for ADLs, and importance of OOB activity/functional mobility.   Functional Mobility   Sit to Stand Supervised   Bed, Chair, Wheelchair Transfer Supervised   Toilet Transfers Supervised   Transfer Method Stand Step   Mobility  w/FWW; req extra time d/t pain   Edema / Skin Assessment   Edema / Skin  Not Assessed   Comments drain in place pre/post session   Activity Tolerance   Comments limited by fatigue and pain   Education Group   Education Provided Role of Occupational Therapist;Pathology of bedrest;Adaptive Equipment;Activities of Daily Living   Role of Occupational Therapist Patient Response Patient;Acceptance;Explanation;Verbal Demonstration;Reinforcement Needed   ADL Patient Response Patient;Acceptance;Explanation;Verbal Demonstration;Reinforcement Needed;Demonstration   Adaptive Equipment Patient Response Patient;Acceptance;Explanation;Verbal Demonstration;Reinforcement Needed   Pathology of Bedrest Patient Response Patient;Acceptance;Explanation;Verbal Demonstration;Reinforcement Needed

## 2023-04-14 NOTE — PROGRESS NOTES
4 Eyes Skin Assessment Completed by Emily, RN and MICHEAL Lopez.    Head WDL  Ears WDL  Nose WDL  Mouth WDL  Neck WDL  Breast/Chest WDL  Shoulder Blades WDL  Spine WDL  (R) Arm/Elbow/Hand WDL  (L) Arm/Elbow/Hand WDL  Abdomen WDL  Groin WDL  Scrotum/Coccyx/Buttocks incision to R outer glute with ZOYA dressing, CAMRYN drain   (R) Leg WDL  (L) Leg WDL  (R) Heel/Foot/Toe WDL  (L) Heel/Foot/Toe WDL          Devices In Places Pulse Ox and SCD's      Interventions In Place Pillows and Pressure Redistribution Mattress    Possible Skin Injury No    Pictures Uploaded Into Epic N/A  Wound Consult Placed N/A  RN Wound Prevention Protocol Ordered No

## 2023-04-15 LAB
ALBUMIN SERPL BCP-MCNC: 3.9 G/DL (ref 3.2–4.9)
ALBUMIN/GLOB SERPL: 1.2 G/DL
ALP SERPL-CCNC: 126 U/L (ref 30–99)
ALT SERPL-CCNC: 84 U/L (ref 2–50)
ANION GAP SERPL CALC-SCNC: 13 MMOL/L (ref 7–16)
AST SERPL-CCNC: 52 U/L (ref 12–45)
BILIRUB SERPL-MCNC: 0.4 MG/DL (ref 0.1–1.5)
BUN SERPL-MCNC: 7 MG/DL (ref 8–22)
CALCIUM ALBUM COR SERPL-MCNC: 9.1 MG/DL (ref 8.5–10.5)
CALCIUM SERPL-MCNC: 9 MG/DL (ref 8.5–10.5)
CHLORIDE SERPL-SCNC: 109 MMOL/L (ref 96–112)
CO2 SERPL-SCNC: 21 MMOL/L (ref 20–33)
CREAT SERPL-MCNC: 0.54 MG/DL (ref 0.5–1.4)
ERYTHROCYTE [DISTWIDTH] IN BLOOD BY AUTOMATED COUNT: 43.1 FL (ref 35.9–50)
GFR SERPLBLD CREATININE-BSD FMLA CKD-EPI: 117 ML/MIN/1.73 M 2
GLOBULIN SER CALC-MCNC: 3.3 G/DL (ref 1.9–3.5)
GLUCOSE SERPL-MCNC: 98 MG/DL (ref 65–99)
HCT VFR BLD AUTO: 36.5 % (ref 37–47)
HGB BLD-MCNC: 11.4 G/DL (ref 12–16)
MCH RBC QN AUTO: 25.4 PG (ref 27–33)
MCHC RBC AUTO-ENTMCNC: 31.2 G/DL (ref 33.6–35)
MCV RBC AUTO: 81.5 FL (ref 81.4–97.8)
PLATELET # BLD AUTO: 173 K/UL (ref 164–446)
PMV BLD AUTO: 10.4 FL (ref 9–12.9)
POTASSIUM SERPL-SCNC: 3.6 MMOL/L (ref 3.6–5.5)
PROT SERPL-MCNC: 7.2 G/DL (ref 6–8.2)
RBC # BLD AUTO: 4.48 M/UL (ref 4.2–5.4)
SODIUM SERPL-SCNC: 143 MMOL/L (ref 135–145)
WBC # BLD AUTO: 5.2 K/UL (ref 4.8–10.8)

## 2023-04-15 PROCEDURE — 99024 POSTOP FOLLOW-UP VISIT: CPT | Performed by: SURGERY

## 2023-04-15 PROCEDURE — 700102 HCHG RX REV CODE 250 W/ 637 OVERRIDE(OP): Performed by: NURSE PRACTITIONER

## 2023-04-15 PROCEDURE — A9270 NON-COVERED ITEM OR SERVICE: HCPCS | Performed by: NURSE PRACTITIONER

## 2023-04-15 PROCEDURE — 700102 HCHG RX REV CODE 250 W/ 637 OVERRIDE(OP): Performed by: SURGERY

## 2023-04-15 PROCEDURE — A9270 NON-COVERED ITEM OR SERVICE: HCPCS | Performed by: SURGERY

## 2023-04-15 PROCEDURE — 36415 COLL VENOUS BLD VENIPUNCTURE: CPT

## 2023-04-15 PROCEDURE — 85027 COMPLETE CBC AUTOMATED: CPT

## 2023-04-15 PROCEDURE — 700105 HCHG RX REV CODE 258: Performed by: NURSE PRACTITIONER

## 2023-04-15 PROCEDURE — 770001 HCHG ROOM/CARE - MED/SURG/GYN PRIV*

## 2023-04-15 PROCEDURE — 700111 HCHG RX REV CODE 636 W/ 250 OVERRIDE (IP): Performed by: SURGERY

## 2023-04-15 PROCEDURE — 80053 COMPREHEN METABOLIC PANEL: CPT

## 2023-04-15 RX ORDER — POLYETHYLENE GLYCOL 3350 17 G/17G
1 POWDER, FOR SOLUTION ORAL DAILY
Status: DISCONTINUED | OUTPATIENT
Start: 2023-04-15 | End: 2023-04-16 | Stop reason: HOSPADM

## 2023-04-15 RX ORDER — DOCUSATE SODIUM 100 MG/1
100 CAPSULE, LIQUID FILLED ORAL 2 TIMES DAILY
Status: DISCONTINUED | OUTPATIENT
Start: 2023-04-15 | End: 2023-04-16 | Stop reason: HOSPADM

## 2023-04-15 RX ADMIN — DOCUSATE SODIUM 100 MG: 100 CAPSULE, LIQUID FILLED ORAL at 09:38

## 2023-04-15 RX ADMIN — DOCUSATE SODIUM 100 MG: 100 CAPSULE, LIQUID FILLED ORAL at 17:04

## 2023-04-15 RX ADMIN — POLYETHYLENE GLYCOL 3350 1 PACKET: 17 POWDER, FOR SOLUTION ORAL at 09:38

## 2023-04-15 RX ADMIN — ENOXAPARIN SODIUM 40 MG: 40 INJECTION SUBCUTANEOUS at 09:37

## 2023-04-15 RX ADMIN — Medication 1 APPLICATOR: at 17:05

## 2023-04-15 RX ADMIN — SODIUM CHLORIDE, POTASSIUM CHLORIDE, SODIUM LACTATE AND CALCIUM CHLORIDE: 600; 310; 30; 20 INJECTION, SOLUTION INTRAVENOUS at 01:23

## 2023-04-15 RX ADMIN — ACETAMINOPHEN 1000 MG: 500 TABLET, FILM COATED ORAL at 09:37

## 2023-04-15 RX ADMIN — Medication 1 APPLICATOR: at 09:41

## 2023-04-15 RX ADMIN — OXYCODONE 5 MG: 5 TABLET ORAL at 07:33

## 2023-04-15 RX ADMIN — ACETAMINOPHEN 1000 MG: 500 TABLET, FILM COATED ORAL at 16:11

## 2023-04-15 RX ADMIN — OXYCODONE 5 MG: 5 TABLET ORAL at 20:28

## 2023-04-15 RX ADMIN — ACETAMINOPHEN 1000 MG: 500 TABLET, FILM COATED ORAL at 20:28

## 2023-04-15 ASSESSMENT — ENCOUNTER SYMPTOMS
FEVER: 0
CHILLS: 0
COUGH: 0

## 2023-04-15 ASSESSMENT — PAIN DESCRIPTION - PAIN TYPE
TYPE: SURGICAL PAIN
TYPE: SURGICAL PAIN
TYPE: ACUTE PAIN
TYPE: ACUTE PAIN

## 2023-04-15 NOTE — CARE PLAN
Problem: Pain - Standard  Goal: Alleviation of pain or a reduction in pain to the patient’s comfort goal  Outcome: Progressing     Problem: Knowledge Deficit - Standard  Goal: Patient and family/care givers will demonstrate understanding of plan of care, disease process/condition, diagnostic tests and medications  Outcome: Progressing   The patient is Stable - Low risk of patient condition declining or worsening    Shift Goals  Clinical Goals: Pain Control, Ambulation  Patient Goals: Pain Control    Progress made toward(s) clinical / shift goals:  Pain medicated per MAR, educated patient on plan of care this shift, patient self-ambulatory    Patient is not progressing towards the following goals:

## 2023-04-15 NOTE — FACE TO FACE
Face to Face Supporting Documentation - Home Health    The encounter with this patient was in whole or in part the primary reason for home health admission.    Date of encounter:   Patient:                    MRN:                       YOB: 2024  Madison Allen  [id number]  1980     Home health to see patient for:  Skilled Nursing care for assessment, interventions & education, Wound Care, and Physical Therapy evaluation and treatment    Skilled need for:  Surgical Aftercare - wound drain right thigh    Skilled nursing interventions to include:  Wound Care and Line/Drain/Airway education and care    Homebound status evidenced by:  Need the aid of supportive devices such as crutches, canes, wheelchairs or walkers or Needs the assistance of another person in order to leave the home. Leaving home requires a considerable and taxing effort. There is a normal inability to leave the home.    Community Physician to provide follow up care: Pcp Pt States None     Optional Interventions? No      I certify the face to face encounter for this home health care referral meets the CMS requirements and the encounter/clinical assessment with the patient was, in whole, or in part, for the medical condition(s) listed above, which is the primary reason for home health care. Based on my clinical findings: the service(s) are medically necessary, support the need for home health care, and the homebound criteria are met.  I certify that this patient has had a face to face encounter by myself.  Tobi Beauchamp A.P.R.N. - NPI: [id number]

## 2023-04-15 NOTE — PROGRESS NOTES
Pt is A&O 4  Pain + medicated per MAR for 7/10 pain ice pack offered   - nausea  Tolerating a regular diet   Incision + to R outer buttock with ZOYA dressing in place  + Drains R hip CAMRYN  + Voids  + flatus  + BM 4/14  Up SBA  SCD's on  Bed alarm off, pt no fall risk per johanna basurto  Reviewed plan of care with patient, bed in lowest position and locked, pt resting comfortably now, call light within reach, all needs met at this time. Interventions will be executed per plan of care

## 2023-04-15 NOTE — CARE PLAN
The patient is Stable - Low risk of patient condition declining or worsening    Shift Goals  Clinical Goals: pain control and drain monitoring  Patient Goals: pain control    Progress made toward(s) clinical / shift goals:    Pt pain medicated per MAR ice pack used intermittently, drain emptied Q4 hrs  Problem: Pain - Standard  Goal: Alleviation of pain or a reduction in pain to the patient’s comfort goal  Outcome: Progressing     Problem: Knowledge Deficit - Standard  Goal: Patient and family/care givers will demonstrate understanding of plan of care, disease process/condition, diagnostic tests and medications  Outcome: Progressing       Patient is not progressing towards the following goals:

## 2023-04-15 NOTE — CARE PLAN
The patient is Stable - Low risk of patient condition declining or worsening    Shift Goals  Clinical Goals: Pain/Drain Management  Patient Goals: Pain Management, Rest    Progress made toward(s) clinical / shift goals:  Medicated per MAR, Drain per flow sheets

## 2023-04-15 NOTE — PROGRESS NOTES
Assumed care of patient at 0645. Bedside report received. Assessment complete.  AA&Ox4. Denies CP/SOB.  Reporting 9/10 pain. Medicated per MAR.   Educated patient regarding pharmacologic and non pharmacologic modalities for pain management.  Skin per flowsheets  Tolerating regular diet. Denies N/V.  + void. Last BM 4/14  Pt ambulates independently.  All needs met at this time. Call light within reach. Pt calls appropriately. Bed low and locked, non skid socks in place. Hourly rounding in place.

## 2023-04-15 NOTE — FACE TO FACE
Face to Face Note  -  Durable Medical Equipment    DANY Carter - NPI: [id number]  I certify that this patient is under my care and that they had a durable medical equipment(DME)face to face encounter by myself that meets the physician DME face-to-face encounter requirements with this patient on:    Date of encounter:   Patient:                    MRN:                       YOB: 2024  Madison Allen  [id number]  1980     The encounter with the patient was in whole, or in part, for the following medical condition, which is the primary reason for durable medical equipment:  Post-Op Surgery    I certify that, based on my findings, the following durable medical equipment is medically necessary:    Walkers.    My Clinical findings support the need for the above equipment due to:  Abnormal Gait, Wound/Incision

## 2023-04-15 NOTE — PROGRESS NOTES
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  Intraoperative ultrasound  3.  Complex layered closure    Hospital Course  POD # 2     Interval Problem Update  No acute events  Right leg/thigh painful and tender, managed on PO/PRN meds  PT/OT sessions yesterday - tolerated well  CAMRYN drain with 120 mL serosang out  Tolerating regular diet  She is in very good spirits.  She is comfortable.  Able to stand on her own but with some discomfort.  We had a long talk regarding her drain and that she would go home with it.  We will set up home health.  Problem List  Active Problems:    R gluteal pain and mass/swellinh POA: Yes    Myxoid liposarcoma (HCC) POA: Yes  Resolved Problems:    * No resolved hospital problems. *     Subjective  Review of Systems   Constitutional:  Negative for chills and fever.   Respiratory:  Negative for cough.    Cardiovascular:  Negative for chest pain.   Musculoskeletal:         Painful right upper leg and thigh   All other systems reviewed and are negative.      Objective  Temp:  [36.7 °C (98.1 °F)-37.5 °C (99.5 °F)] 37.5 °C (99.5 °F)  Pulse:  [70-86] 86  Resp:  [16-20] 20  BP: ()/(60-80) 105/60  SpO2:  [92 %-96 %] 95 %      Physical Exam  Vitals and nursing note reviewed.   Constitutional:       General: She is not in acute distress.     Appearance: Normal appearance. She is ill-appearing.   HENT:      Head: Normocephalic and atraumatic.      Nose: Nose normal.      Mouth/Throat:      Pharynx: Oropharynx is clear.   Eyes:      Conjunctiva/sclera: Conjunctivae normal.   Cardiovascular:      Rate and Rhythm: Normal rate.   Pulmonary:      Effort: Pulmonary effort is normal. No respiratory distress.   Abdominal:      General: Abdomen is flat. Bowel sounds are normal.       Palpations: Abdomen is soft.   Musculoskeletal:         General: Deformity present.      Comments: Right upper thigh/leg with intact surgical ZOYA dressing, mild tenderness over dressing  Intact distal sensation and circulation and strength  CAMRYN drain with 120 mL serosang output  No sign of seroma   Skin:     General: Skin is warm and dry.   Neurological:      Mental Status: She is alert and oriented to person, place, and time.   Psychiatric:         Mood and Affect: Mood normal.         Behavior: Behavior normal.        Intake/Output Summary (Last 24 hours) at 4/15/2023 0916  Last data filed at 4/15/2023 0400  Gross per 24 hour   Intake 840 ml   Output 80 ml   Net 760 ml         Labs  Lab Results   Component Value Date/Time    SODIUM 139 04/14/2023 03:51 AM    POTASSIUM 3.9 04/14/2023 03:51 AM    CHLORIDE 107 04/14/2023 03:51 AM    CO2 24 04/14/2023 03:51 AM    GLUCOSE 107 (H) 04/14/2023 03:51 AM    BUN 12 04/14/2023 03:51 AM    CREATININE 0.55 04/14/2023 03:51 AM    CREATININE 0.8 09/06/2007 10:25 AM         Lab Results   Component Value Date/Time    WBC 5.5 04/14/2023 03:51 AM    RBC 4.10 (L) 04/14/2023 03:51 AM    HEMOGLOBIN 10.5 (L) 04/14/2023 03:51 AM    HEMATOCRIT 33.3 (L) 04/14/2023 03:51 AM    MCV 81.2 (L) 04/14/2023 03:51 AM    MCH 25.6 (L) 04/14/2023 03:51 AM    MCHC 31.5 (L) 04/14/2023 03:51 AM    MPV 10.8 04/14/2023 03:51 AM    NEUTSPOLYS 58.10 11/01/2022 12:29 PM    LYMPHOCYTES 30.00 11/01/2022 12:29 PM    MONOCYTES 6.50 11/01/2022 12:29 PM    EOSINOPHILS 4.10 11/01/2022 12:29 PM    BASOPHILS 0.80 11/01/2022 12:29 PM         Recent Labs     04/14/23  0351   ASTSGOT 93*   ALTSGPT 91*   TBILIRUBIN 0.4   GLOBULIN 2.7       Assessment/Plan  Patient is a 42F with newly identified fast-growing right posterior thigh myxoid liposarcoma.  Completed neoadjuvant radiation therapy of 50 Hernandez in 25 fractions. Now s/p radical resection of right thigh tumor.    Pain controlled on PO/PRN  Continue diet  Bowel  regimen  Continue PT  Heplock IV  Encourage OOB/ambulate    Tentative aim for DC home tomorrow 4/16/23  We will set up home health.     Tobi Beauchamp MS, MPH, NP  Surgical Oncology  April 14, 2023, 7:55 AM

## 2023-04-16 ENCOUNTER — PHARMACY VISIT (OUTPATIENT)
Dept: PHARMACY | Facility: MEDICAL CENTER | Age: 43
End: 2023-04-16
Payer: COMMERCIAL

## 2023-04-16 VITALS
HEART RATE: 68 BPM | SYSTOLIC BLOOD PRESSURE: 121 MMHG | DIASTOLIC BLOOD PRESSURE: 64 MMHG | HEIGHT: 61 IN | OXYGEN SATURATION: 98 % | BODY MASS INDEX: 26.85 KG/M2 | TEMPERATURE: 98.1 F | WEIGHT: 142.2 LBS | RESPIRATION RATE: 18 BRPM

## 2023-04-16 LAB
ALBUMIN SERPL BCP-MCNC: 3.7 G/DL (ref 3.2–4.9)
ALBUMIN/GLOB SERPL: 1.1 G/DL
ALP SERPL-CCNC: 122 U/L (ref 30–99)
ALT SERPL-CCNC: 66 U/L (ref 2–50)
ANION GAP SERPL CALC-SCNC: 10 MMOL/L (ref 7–16)
AST SERPL-CCNC: 34 U/L (ref 12–45)
BILIRUB SERPL-MCNC: 0.3 MG/DL (ref 0.1–1.5)
BUN SERPL-MCNC: 10 MG/DL (ref 8–22)
CALCIUM ALBUM COR SERPL-MCNC: 9.3 MG/DL (ref 8.5–10.5)
CALCIUM SERPL-MCNC: 9.1 MG/DL (ref 8.5–10.5)
CHLORIDE SERPL-SCNC: 107 MMOL/L (ref 96–112)
CO2 SERPL-SCNC: 23 MMOL/L (ref 20–33)
CREAT SERPL-MCNC: 0.51 MG/DL (ref 0.5–1.4)
ERYTHROCYTE [DISTWIDTH] IN BLOOD BY AUTOMATED COUNT: 43.4 FL (ref 35.9–50)
GFR SERPLBLD CREATININE-BSD FMLA CKD-EPI: 118 ML/MIN/1.73 M 2
GLOBULIN SER CALC-MCNC: 3.3 G/DL (ref 1.9–3.5)
GLUCOSE SERPL-MCNC: 106 MG/DL (ref 65–99)
HCT VFR BLD AUTO: 35.5 % (ref 37–47)
HGB BLD-MCNC: 11.4 G/DL (ref 12–16)
MCH RBC QN AUTO: 25.7 PG (ref 27–33)
MCHC RBC AUTO-ENTMCNC: 32.1 G/DL (ref 33.6–35)
MCV RBC AUTO: 80.1 FL (ref 81.4–97.8)
PLATELET # BLD AUTO: 157 K/UL (ref 164–446)
PMV BLD AUTO: 10.5 FL (ref 9–12.9)
POTASSIUM SERPL-SCNC: 3.8 MMOL/L (ref 3.6–5.5)
PROT SERPL-MCNC: 7 G/DL (ref 6–8.2)
RBC # BLD AUTO: 4.43 M/UL (ref 4.2–5.4)
SODIUM SERPL-SCNC: 140 MMOL/L (ref 135–145)
WBC # BLD AUTO: 4.2 K/UL (ref 4.8–10.8)

## 2023-04-16 PROCEDURE — 36415 COLL VENOUS BLD VENIPUNCTURE: CPT

## 2023-04-16 PROCEDURE — A9270 NON-COVERED ITEM OR SERVICE: HCPCS | Performed by: NURSE PRACTITIONER

## 2023-04-16 PROCEDURE — A9270 NON-COVERED ITEM OR SERVICE: HCPCS | Performed by: SURGERY

## 2023-04-16 PROCEDURE — 700102 HCHG RX REV CODE 250 W/ 637 OVERRIDE(OP): Performed by: SURGERY

## 2023-04-16 PROCEDURE — 700111 HCHG RX REV CODE 636 W/ 250 OVERRIDE (IP): Performed by: SURGERY

## 2023-04-16 PROCEDURE — 85027 COMPLETE CBC AUTOMATED: CPT

## 2023-04-16 PROCEDURE — 700102 HCHG RX REV CODE 250 W/ 637 OVERRIDE(OP): Performed by: NURSE PRACTITIONER

## 2023-04-16 PROCEDURE — RXMED WILLOW AMBULATORY MEDICATION CHARGE: Performed by: SURGERY

## 2023-04-16 PROCEDURE — 80053 COMPREHEN METABOLIC PANEL: CPT

## 2023-04-16 RX ORDER — OXYCODONE HYDROCHLORIDE 5 MG/1
5 CAPSULE ORAL
Qty: 40 CAPSULE | Refills: 0 | Status: SHIPPED | OUTPATIENT
Start: 2023-04-16 | End: 2023-04-26

## 2023-04-16 RX ORDER — PSEUDOEPHEDRINE HCL 30 MG
100 TABLET ORAL 2 TIMES DAILY
Qty: 60 CAPSULE | Refills: 1 | Status: SHIPPED | OUTPATIENT
Start: 2023-04-16

## 2023-04-16 RX ADMIN — ACETAMINOPHEN 1000 MG: 500 TABLET, FILM COATED ORAL at 04:05

## 2023-04-16 RX ADMIN — Medication 1 APPLICATOR: at 04:52

## 2023-04-16 RX ADMIN — ACETAMINOPHEN 1000 MG: 500 TABLET, FILM COATED ORAL at 10:19

## 2023-04-16 RX ADMIN — ENOXAPARIN SODIUM 40 MG: 40 INJECTION SUBCUTANEOUS at 10:19

## 2023-04-16 RX ADMIN — DOCUSATE SODIUM 100 MG: 100 CAPSULE, LIQUID FILLED ORAL at 04:52

## 2023-04-16 RX ADMIN — POLYETHYLENE GLYCOL 3350 1 PACKET: 17 POWDER, FOR SOLUTION ORAL at 04:56

## 2023-04-16 ASSESSMENT — PAIN DESCRIPTION - PAIN TYPE
TYPE: ACUTE PAIN
TYPE: ACUTE PAIN

## 2023-04-16 NOTE — PROGRESS NOTES
Assumed care of patient at 0645. Bedside report received. Assessment complete.  AA&Ox4. Denies CP/SOB.  Reporting 0/10 pain. Declined intervention at this time.  Educated patient regarding pharmacologic and non pharmacologic modalities for pain management.  Skin per flowsheets  Tolerating Regular diet. Denies N/V.  + void. + BM. Last BM 4/16  Pt ambulates independently.  All needs met at this time. Call light within reach. Pt calls appropriately. Bed low and locked, non skid socks in place. Hourly rounding in place.

## 2023-04-16 NOTE — DISCHARGE INSTRUCTIONS
Discharge Instructions    Discharged to home by car with relative. Discharged via wheelchair, hospital escort: Yes.  Special equipment needed: CAMRYN Drain/ ZOYA/ Front Wheel Walker     Be sure to schedule a follow-up appointment with your primary care doctor or any specialists as instructed.     Discharge Plan:        I understand that a diet low in cholesterol, fat, and sodium is recommended for good health. Unless I have been given specific instructions below for another diet, I accept this instruction as my diet prescription.   Other diet:     Special Instructions: None    -Is this patient being discharged with medication to prevent blood clots?  No    Is patient discharged on Warfarin / Coumadin?   No

## 2023-04-16 NOTE — DISCHARGE PLANNING
Received Choice form at 4560  Agency/Facility Name: [facility]  Referral sent per Choice form @ 1692

## 2023-04-16 NOTE — PROGRESS NOTES
Discharging Patient home per physician order. Yakut Speaking RN provided discharge teachings for patient. Discharged with Relatives and Friends.  Demonstrated understanding of discharge instructions, follow up appointments, home medications, prescriptions, home care for surgical wound, and nursing care instructions for R Thigh CAMRYN Drain and Alicia Drain. Patient verbalized understanding in how to document CAMRYN Drain output.  Ambulating with assistance via FWW, voiding without difficulty, pain well controlled, tolerating oral medications, oxygen saturation greater than 90% , tolerating diet. Educational handouts given and discussed.  Verbalized understanding of discharge instructions and educational handouts.  Stated several reasons why to return to ED or seek medical attention. All questions answered.  Belongings and dressing supplies with patient at time of discharge.

## 2023-04-16 NOTE — CARE PLAN
Problem: Pain - Standard  Goal: Alleviation of pain or a reduction in pain to the patient’s comfort goal  Outcome: Progressing     Problem: Knowledge Deficit - Standard  Goal: Patient and family/care givers will demonstrate understanding of plan of care, disease process/condition, diagnostic tests and medications  Outcome: Progressing   The patient is Stable - Low risk of patient condition declining or worsening    Shift Goals  Clinical Goals: Discharge  Patient Goals: Discharge    Progress made toward(s) clinical / shift goals:  Patient educated on plan of care this shift, pain medicated per MAR    Patient is not progressing towards the following goals:

## 2023-04-16 NOTE — PROGRESS NOTES
Bedside report received, assessment completed    A&O x  4, pt calls appropriately  Mobility: Up self  Fall Risk Assessment: Low, door notifications in use  Pain Assessment / Reassessment completed, medication provided per MAR  Diet: Regular   LDA:   IV Access: 20 L FA, CDI/ flushed/ SL  CAMRYN drain: RLE/ buttock     GI/: + void, + flatus,  4/14 BM  DVT Prophylaxis: Lovenox, SCD's on    Reviewed plan of care with patient, bed in lowest position and locked, pt resting comfortably now, call light within reach, all needs met at this time. Interventions will be executed per plan of care

## 2023-04-16 NOTE — DISCHARGE PLANNING
KENYATTA RN received notification pt planned to DC today. PT recommending HH. Pt has access to Healthcare. KENYATTA RN recommended giving pt OP PT and OT scripts. KENYATTA RN did forward referral to [facility] for an opening for out of pocket cost or pro julius options.

## 2023-04-16 NOTE — CARE PLAN
The patient is Stable - Low risk of patient condition declining or worsening    Shift Goals  Clinical Goals: pain control, monitor CAMRYN, and rest  Patient Goals: pain control and rest    Progress made toward(s) clinical / shift goals:  Pain managed per MAR. Monitoring CAMRYN drain intermittently throughout shift.     Problem: Pain - Standard  Goal: Alleviation of pain or a reduction in pain to the patient’s comfort goal  Outcome: Progressing     Problem: Knowledge Deficit - Standard  Goal: Patient and family/care givers will demonstrate understanding of plan of care, disease process/condition, diagnostic tests and medications  Outcome: Progressing

## 2023-04-17 ENCOUNTER — TELEPHONE (OUTPATIENT)
Dept: SURGICAL ONCOLOGY | Facility: MEDICAL CENTER | Age: 43
End: 2023-04-17
Payer: COMMERCIAL

## 2023-04-17 DIAGNOSIS — C49.9 MYXOID LIPOSARCOMA (HCC): ICD-10-CM

## 2023-04-17 NOTE — TELEPHONE ENCOUNTER
"Called Ms Camara to follow up from recent surgery.  Translation service used for call.    Ms Camara mentioned she was doing well since return from hospital, she states she is walking OK at home, and she has minimal pain. She is not using prescribed pain medications. She denies any swelling, increased pain, or redness around the incision. She reports 80mL \"pink\" TOTAL output from drain in the 1.5 days since leaving hospital, with appearance of reduced output in last day.    We discussed wound care instructions, including removal of ZOYA dressing when battery ended.    We discussed return precautions, in case of any worsening symptoms    We discussed plans for PT. We will contact her insurance company to request this.    We confirmed next follow up appointment with Dr Rojas on 4/25 at 1 PM    All questions answered. She expressed appreciation for care provided.  "

## 2023-04-17 NOTE — DISCHARGE SUMMARY
DATE OF ADMISSION:  04/13/2023   DATE OF DISCHARGE:  04/16/2023     HOSPITAL COURSE:  The patient is a 42-year-old female patient of Dr. 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    She has been doing well and now she is postoperative day #3, tolerating a   general diet, ambulating.  Her pain is well controlled with p.o. pain   medication.  Her CAMRYN outputs are serosanguineous about 120 a day.  Because of   this instability, the patient will be discharged home today.  She is going   home on OxyContin 5 mg p.o. q.6 hours p.r.n. severe pain and Tylenol.  She was   given a script for Colace, we have instructed her that if she develops fevers   greater than 101 or nausea, vomiting or worsening pain or swelling or   bleeding to contact the office at [phone].     PHYSICAL EXAMINATION:  Prior to discharge, the patient is alert and oriented   x3.  Focused exam of the buttock shows no sign of swelling or seroma.  Drain   is intact.  Overlying dressing is intact with a Tegaderm and there was no sign   of seepage or infection or erythema.     ASSESSMENT AND PLAN:  The patient will be discharged today and will follow up   with Dr. Rojas this month.  At this point, she has been given a followup   appointment for 04/19/2023.  She is instructed to call the office on Monday   morning to set that appointment up.        ______________________________  MD REBECCA Allison/RAPHAEL/KIMMY    DD:  04/16/2023 09:29  DT:  04/16/2023 17:29    Job#:  [id number]

## 2023-04-25 ENCOUNTER — OFFICE VISIT (OUTPATIENT)
Dept: SURGICAL ONCOLOGY | Facility: MEDICAL CENTER | Age: 43
End: 2023-04-25
Payer: COMMERCIAL

## 2023-04-25 VITALS
WEIGHT: 141.9 LBS | TEMPERATURE: 98.2 F | SYSTOLIC BLOOD PRESSURE: 98 MMHG | HEART RATE: 81 BPM | OXYGEN SATURATION: 98 % | BODY MASS INDEX: 26.81 KG/M2 | DIASTOLIC BLOOD PRESSURE: 64 MMHG

## 2023-04-25 DIAGNOSIS — C49.9 MYXOID LIPOSARCOMA (HCC): ICD-10-CM

## 2023-04-25 PROCEDURE — 99024 POSTOP FOLLOW-UP VISIT: CPT | Performed by: SURGERY

## 2023-04-25 ASSESSMENT — ENCOUNTER SYMPTOMS
PHOTOPHOBIA: 0
WEIGHT LOSS: 0
PALPITATIONS: 0
CHILLS: 0
FEVER: 0
CLAUDICATION: 0
NAUSEA: 0
SPUTUM PRODUCTION: 0
VOMITING: 0
BLURRED VISION: 0
HEARTBURN: 0
EYE PAIN: 0
TREMORS: 0
ABDOMINAL PAIN: 0
DIARRHEA: 0
DEPRESSION: 0
CONSTIPATION: 0
NERVOUS/ANXIOUS: 0
NECK PAIN: 0
DOUBLE VISION: 0
COUGH: 0
TINGLING: 0
EYE DISCHARGE: 0
BRUISES/BLEEDS EASILY: 0
MYALGIAS: 0
SENSORY CHANGE: 0
HALLUCINATIONS: 0
BACK PAIN: 0
DIZZINESS: 0
SPEECH CHANGE: 0
HEADACHES: 0
FOCAL WEAKNESS: 1
ORTHOPNEA: 0
HEMOPTYSIS: 0

## 2023-04-25 ASSESSMENT — LIFESTYLE VARIABLES: SUBSTANCE_ABUSE: 0

## 2023-04-25 ASSESSMENT — FIBROSIS 4 INDEX: FIB4 SCORE: 1.15

## 2023-04-25 NOTE — PROGRESS NOTES
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  Intraoperative ultrasound  3.  Complex layered closure    HPI: Patient is a 42-year-old female otherwise healthy who presents for surgical evaluation for newly identified right posterior thigh myxoid liposarcoma.  The patient noted a small mass in her right gluteal region in June 2020.  Over the last few months it has started to increase significantly in size.  She not been symptomatic from it previously but it has started to cause her some discomfort with sitting down, driving, going to the bathroom.  She denies any pain, weakness, decreased sensation in the right lower extremity.  She was initially evaluated in our medical oncology clinic and a biopsy was performed.  The biopsy was consistent with a myxoid liposarcoma with no round cell component identified in the limited sample.    She presents to me today for to discuss the management options.      Patient presents today for surgical evaluation of newly identified myxoid liposarcoma of the right gluteus muscle extending down to the right posterior thigh.  She states that she does feel as if the mass is grown since her biopsy was performed.  She does has discomfort directly over the area when she sits down, drives, goes to the bathroom.  She denies any neuromuscular symptoms of the right lower extremity and appears to have full function of the extremity.  She denies any fevers or chills.  No recent weight loss.  Denies any masses in any other places.    Update 2/1/2023:   Patient presents today for follow-up post neoadjuvant radiation therapy.  She did well with therapy did not experience  any significant complications and notes that she feels that the mass has shrunk significantly since I originally saw her.  She completed radiation on 2023 with a total of 50 Gray in 25 fractions.  She has done remarkably well.  She denies any pain over the area of the mass.  She is scheduled for a CT of the chest abdomen and pelvis tomorrow.    Date 3/28/2023:    Patient presents today for preoperative discussion.  She was seen by plastic surgery and I had a discussion with Dr. Santizo in regards to the possible need for reconstruction for closure of the wound.  Given the shrinkage of the tumor does feel that we might be able to get this wound closed.  She in general is doing well since I last saw her.  She does not feel any significant growth of the mass of the lesion and is otherwise recovered well from her radiation.    Update 2023:  Patient presents today for her first postoperative visit.  She has generally been doing well at home.  Her drain is putting out approximately 40 to 50 cc a day of clear serous fluid.  She continues to be weak with ambulation but this is slowly improving and she is needing to use her cane less.  She is having a little bit of pain but it is well controlled with minimal need for pain medication.  She is otherwise tolerating a diet and denies any drainage or purulent material from her dressing.      Body mass index is 26.81 kg/m².    ECO    Past Medical History:          Past Medical History:   Diagnosis Date    Pregnant state, incidental        Past Surgical History:        Past Surgical History:   Procedure Laterality Date    [facility] ULTRASONIC GUIDANCE, INTRAOPERATIVE  2023    Procedure: ULTRASOUND GUIDANCE;  Surgeon: Artie Rojas M.D.;  Location: SURGERY [facility];  Service: General    MASS EXCISION GENERAL  2023    Procedure: RADICAL RESECTION OF RIGHT GLUTEAL MASS;  Surgeon: Artie Rojas M.D.;  Location: SURGERY [facility];  Service: General     CHOLECYSTECTOMY  Dx 2006    no complications       Current Medications:   Home Medications    Medication Sig Taking? Last Dose Authorizing Provider   docusate sodium 100 MG Cap Take 100 mg by mouth 2 times a day.   Nikhil Charles M.D.   oxycodone 5 MG capsule Take 1 Capsule by mouth every 3 hours as needed for Severe Pain for up to 10 days.   Nikhil Charles M.D.   acetaminophen (TYLENOL) 325 MG Tab Take 650 mg by mouth every four hours as needed.   Physician Outpatient   etonogestrel (NEXPLANON) 68 MG Implant implant Inject 1 Each under the skin one time.   Physician Outpatient            Allergies:         Allergies   Allergen Reactions    Nkda [No Known Drug Allergy]        Family History:          Family History   Problem Relation Age of Onset    Heart Disease Mother 62        hearth attack    Diabetes Mother         pills    Hypertension Mother     Cancer Father         Lung    Alcohol/Drug Father         alcoholic    Thyroid Sister     Cancer Brother         Lung    Diabetes Brother         diet       Social History:          Social History     Socioeconomic History    Marital status: Single     Spouse name: Not on file    Number of children: Not on file    Years of education: Not on file    Highest education level: Not on file   Occupational History    Not on file   Tobacco Use    Smoking status: Never    Smokeless tobacco: Never    Tobacco comments:     last used 9 years ago   Vaping Use    Vaping Use: Never used   Substance and Sexual Activity    Alcohol use: No     Comment: socially    Drug use: No    Sexual activity: Yes     Partners: Male     Birth control/protection: Condom   Other Topics Concern    Not on file   Social History Narrative    Not on file     Social Determinants of Health     Financial Resource Strain: Not on file   Food Insecurity: Not on file   Transportation Needs: Not on file   Physical Activity: Not on file   Stress: Not on file   Social Connections: Not on file    Intimate Partner Violence: Not on file   Housing Stability: Not on file       Review of Systems:  Review of Systems - History obtained from the patient    Review of Systems   Constitutional:  Negative for chills, fever, malaise/fatigue and weight loss.   HENT:  Negative for congestion, ear discharge, ear pain, hearing loss and nosebleeds.    Eyes:  Negative for blurred vision, double vision, photophobia, pain and discharge.   Respiratory:  Negative for cough, hemoptysis and sputum production.    Cardiovascular:  Negative for chest pain, palpitations, orthopnea and claudication.   Gastrointestinal:  Negative for abdominal pain, constipation, diarrhea, heartburn, nausea and vomiting.   Genitourinary:  Negative for dysuria, frequency, hematuria and urgency.   Musculoskeletal:  Negative for back pain, joint pain, myalgias and neck pain.   Skin:  Negative for itching and rash.   Neurological:  Positive for focal weakness. Negative for dizziness, tingling, tremors, sensory change, speech change and headaches.   Endo/Heme/Allergies:  Negative for environmental allergies. Does not bruise/bleed easily.   Psychiatric/Behavioral:  Negative for depression, hallucinations, substance abuse and suicidal ideas. The patient is not nervous/anxious.      All other ROS negative.      Physical Exam:  BP 98/64 (BP Location: Left arm, Patient Position: Sitting, BP Cuff Size: Adult)   Pulse 81   Temp 36.8 °C (98.2 °F) (Temporal)   Wt 64.4 kg (141 lb 14.4 oz)   LMP 12/01/2022 (Approximate)   SpO2 98%   BMI 26.81 kg/m²       Physical Exam  Constitutional:       General: She is not in acute distress.     Appearance: Normal appearance. She is not ill-appearing.   HENT:      Head: Normocephalic.   Eyes:      Conjunctiva/sclera: Conjunctivae normal.      Pupils: Pupils are equal, round, and reactive to light.   Cardiovascular:      Rate and Rhythm: Normal rate and regular rhythm.   Pulmonary:      Effort: Pulmonary effort is normal. No  "respiratory distress.   Abdominal:      General: Abdomen is flat.      Palpations: Abdomen is soft.   Musculoskeletal:         General: No deformity.      Cervical back: Neck supple.      Comments: Incision of the right gluteal region is well-healed clean dry and intact without erythema or drainage.  The parrish dressing was removed today and the sutures were removed with Steri-Strips left in place.  The drain output appears serous was approximately 50 cc/day.  Foot flexion and extension are intact flexion and section of the knee are intact.  She has reasonable flexion and extension of the right hip but when walking is not able to take a full stride at this time.  It is improving per the patient.   Neurological:      Mental Status: She is alert.            Imaging:   CT pelvis with contrast ([facility]) 11/1/2022:Fairly well-circumscribed ovoid mass in the right gluteus denise muscle measuring 15 x 9 x 6 cm.  Characterized by areas of mostly fluid density but some fatty components are seen as well.  No extension beyond the confines of the muscle is seen.  Some fat is seen peripherally to the margins.  No lymphadenopathy.    MRI femur with and without contrast right ([facility]) 11/2/2022: Large soft tissue mass involving the right buttock inferiorly which extends into the right upper posterior thigh.  Appears to involve the gluteus medius denise muscle as well as contact the hamstrings muscle.  Measures 16 x 10 x 7.3 cm in size and characterized by heterogeneous increased T2 signal with mixed intermediate and increased T1 signal with heterogeneous enhancement.  No evidence of adenopathy.    CT chest abdomen pelvis without contrast ([facility]) 11/2/2022: No noncontrast evidence of metastasis.  Right gluteus denise centered mass is unchanged.  \"Prior cholecystectomy.  Calcification the right lobe of the liver is indeterminate but may represent remote granulomatous disease.    CT chest abdomen pelvis with contrast 2/2/2023:  1.  " Negative for metastatic disease within the chest, abdomen, or pelvis  2.  2.6 cm right adnexal cyst is likely physiologic in this age group  3.  Previous cholecystectomy  4.  Right gluteal intramuscular mass is considerably smaller now measuring 5.7 x 2.6 cm and previously 9.3 x 6.0 cm consistent with favorable response to therapy    MRI right femur with and without contrast 2/17/2023:  Interval decrease in size of a large right inferior buttock/upper thigh mass which involves the right gluteus denise muscle and contacts the hamstrings muscles. This currently measures 10 x 5.6 x 3.6 cm in size compared to previous measurement of 16 x   10 x 7.3 cm. This is again most consistent with myxoid variant liposarcoma.    Pathology:   IR guided biopsy ([facility]) 11/12/2022:    right posterior thigh mass on core biopsy consistent with myxoid liposarcoma.  There is no round cell component identified in the limited sample.    Surgical pathology from 4/13/2023:  A. Sarcoma:          Myxoid liposarcoma.     SOFT TISSUE: Resection   CLINICAL     Preresection Treatment:  Radiation therapy performed   SPECIMEN     Procedure:  Wide resection (per OP note)   TUMOR     Tumor Focality:  Unifocal     Tumor Site:  Trunk and extremities - right gluteus denise muscle     Tumor Size:  Greatest Dimension (Centimeters) - 7.2 cm     Histologic Type (WHO):  Myxoid liposarcoma       Percentage of Hypercellular Areas - 0%     Histologic Grade (FNCLCC):  Grade 1     Mitotic Rate:  0 mitoses per 10 high-power fields (HPF)     Necrosis (macroscopic or microscopic):  Not identified     Treatment Effect:  Not identified     Lymphovascular Invasion:  Not identified     Tumor Comment:  Per OP note, the tumor did shrink to half its size     after therapy but no histologic evidence is noted on slides.   MARGINS     Margin Status:  Tumor present at margin       Margin(s) Involved by Tumor:  Posterior margin is positive     Margin Comment:  Lateral margin is  uninvolved by < 1/10 mm in     distance and medial margin is uninvolved by < 1/2 mm and anterior     margin is 3 mm in distance.  All other margins are at least 5 mm in     distance.   REGIONAL LYMPH NODES     Regional Lymph Node Status:  Not applicable (no regional lymph nodes     submitted or found)   PATHOLOGIC STAGE CLASSIFICATION (pTNM, AJCC 8th Edition)   Reporting of pT, pN, and (when applicable) pM categories is based on   information available to the pathologist at the time the report is   issued. As per the AJCC (Chapter 1, 8th Ed.) it is the managing   physician's responsibility to establish the final pathologic stage   based upon all pertinent information, including but potentially not   limited to this pathology report.     Pathologic Stage Classification:  Histologic type appropriate for     staging       TNM Descriptors:  y (post-treatment)       pT Category:  pT2       pN Category:  pN not assigned (no nodes submitted or found)   SPECIAL STUDIES     Immunohistochemistry:  Not performed     Cytogenetics:  Not performed     Molecular Pathology:  Not performed   Comment(s):  There is adequate tumor present in multiple blocks (A4   specifically) for additional studies if clinically indicated. Per OP   note, 3 to 5 cm margins were thought to have been obtained.     Labs:   No labs since discharge    Assessment and Plan:   Patient is a very pleasant 42-year-old female who presents for surgical evaluation for a newly identified right gluteal myxoid liposarcoma.  She has completed neoadjuvant radiation therapy and underwent resection on 4/13/2023.    She presents today for first postoperative visit.  In general she is doing well and her strength is slowly returning.  Drain output is still a little bit higher than we would like so we will leave this in place although it does appear serous and benign.  She will call the office if it is less than 30 cc for 2 consecutive days and then we can plan to remove it.   With regards to her weakness it does appear that this is slowly getting better and she is working on her exercises at home we will just continue to monitor this and if she should need further physical therapy we will work on arranging that as an outpatient.    We had an extensive discussion about her pathology today.  It was consistent with a grade 1 myxoid liposarcoma.  The posterior margin was noted to be positive on final pathology.  During the operation we did take the posterior fascia of the gluteus denise muscle but I was concerned about taking additional deeper muscle as I did feel it may affect her functional status.  The lateral and medial margins we knew were closed in the operating room and did peel these off of the sciatic notch and the femur.  She has also received radiation to this area.  I explained to her that I will present her case at our multidisciplinary tumor board to assess whether or not anybody thinks additional treatment is necessary at this time.  I do not think it would be valuable to attempt resection of the positive margin as it would likely entail removing a fair bit of additional muscular tissue and likely would affect her overall function.  Pending the tumor board discussion we will likely plan on initiating a surveillance plan with every 3 month MRIs of the right lower extremity and every 6 month CTs of the chest abdomen pelvis      Plan:  -Continue with drain in place, patient to call when it is less than 30 cc for 2 consecutive days then we will remove  - We will present the patient at our multidisciplinary tumor board to evaluate for additional treatment versus initiating a surveillance plan  - We will discuss with the patient the next step in management pending the results of the tumor board discussion      The patient and family asked several great questions which were answered to  their satisfaction.  They  are in agreement with the care plan as outlined above.      Artie  VIVI Rojas MD  Surgical Oncology

## 2023-05-03 DIAGNOSIS — C49.9 MYXOID LIPOSARCOMA (HCC): ICD-10-CM

## 2023-05-03 NOTE — PROGRESS NOTES
Cancer Conference:   Conference Date: 5/3/2023    Presenter: MD Madison Yan disease status and treatment plans were discussed at multi-disciplinary   cancer conference. This included review of current patient assessments, updated imaging, and   available pathology results. Other relevant factors considered, including as medical   oncology genetics, nurse navigation, and social work.    Current Diagnosis:     S/p neoadjuvant radiation with surgical resection on 4/13/2023 with myxoid sarcoma - path 7.2 cm no hypercellular area, grade 1, posterior margin was positive. She has a rigorous job and difficult to resect further posteriorly as it will cause concern of hip instability and relegate her to life long usage of brace.    Updated Assessment / Status / Plan:     Discussion on whether to go back for surgical resection due to the concern of her ability for mobility. Plan is to monitor as not currently a radiation candidate due to previous radiation. No role for chemotherapy. Refer to genetics.

## 2023-05-05 NOTE — PROGRESS NOTES
Subjective:   5/10/2023  8:13 AM  Primary care physician: Pcp Pt States None  Referring Provider: CHERRI Gregory  Medical Oncologist: CHAZ Gregory  Radiation Oncology: Charles Delgado MD    Chief Complaint: No chief complaint on file.    Diagnosis:   1. Myxoid liposarcoma (HCC)          History of presenting illness:    Patient is a 42-year-old female otherwise healthy who presents for surgical evaluation for newly identified right posterior thigh myxoid liposarcoma.  The patient noted a small mass in her right gluteal region in June 2020.  Over the last few months it has started to increase significantly in size.  She not been symptomatic from it previously but it has started to cause her some discomfort with sitting down, driving, going to the bathroom.  She denies any pain, weakness, decreased sensation in the right lower extremity.  She was initially evaluated in our medical oncology clinic and a biopsy was performed.  The biopsy was consistent with a myxoid liposarcoma with no round cell component identified in the limited sample.     Patient presents today for surgical evaluation of newly identified myxoid liposarcoma of the right gluteus muscle extending down to the right posterior thigh.  She states that she does feel as if the mass is grown since her biopsy was performed.  She does has discomfort directly over the area when she sits down, drives, goes to the bathroom.  She denies any neuromuscular symptoms of the right lower extremity and appears to have full function of the extremity.  She denies any fevers or chills.  No recent weight loss.  Denies any masses in any other places.     Update 2/1/2023:   Patient presents today for follow-up post neoadjuvant radiation therapy.  She did well with therapy did not experience any significant complications and notes that she feels that the mass has shrunk significantly since I originally saw her.  She completed radiation on 1/19/2023 with a total of  50 Hernandez in 25 fractions.  She has done remarkably well.  She denies any pain over the area of the mass.  She is scheduled for a CT of the chest abdomen and pelvis tomorrow.     Update 3/28/2023:  Patient presents today for preoperative discussion.  She was seen by plastic surgery and I had a discussion with Dr. Santizo in regards to the possible need for reconstruction for closure of the wound. Given the shrinkage of the tumor does feel that we might be able to get this wound closed.  She in general is doing well since I last saw her.  She does not feel any significant growth of the mass of the lesion and is otherwise recovered well from her radiation.     Update 4/25/2023:  Operation 4/13/2023:  1.  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  Intraoperative ultrasound  3.  Complex layered closure    Patient presents today for her first postoperative visit.  She has generally been doing well at home. Her drain is putting out approximately 40 to 50 cc a day of clear serous fluid.  She continues to be weak with ambulation but this is slowly improving and she is needing to use her cane less.  She is having a little bit of pain but it is well controlled with minimal need for pain medication.  She is otherwise tolerating a diet and denies any drainage or purulent material from her dressing.    Update 5/10/23  Tumor board discussion in regards to her final pathology and margins.  The ultimate decision of the tumor board was to initiate ongoing surveillance with no need for additional adjuvant therapy at this time.    Drain is putting out approximately 15 cc/day    She is here today for a postoperative follow-up visit.  She has been doing well.  She states her strength is continuing to improve and she is walking better and better each day.  Her drain output has been minimal and serous appearing.  She had no issues with her wound.    Past Medical History:   Diagnosis Date    Pregnant state,  incidental      Past Surgical History:   Procedure Laterality Date    OK ULTRASONIC GUIDANCE, INTRAOPERATIVE  4/13/2023    Procedure: ULTRASOUND GUIDANCE;  Surgeon: Artie Rojas M.D.;  Location: SURGERY [facility];  Service: General    MASS EXCISION GENERAL  4/13/2023    Procedure: RADICAL RESECTION OF RIGHT GLUTEAL MASS;  Surgeon: Artie Rojas M.D.;  Location: SURGERY [facility];  Service: General    CHOLECYSTECTOMY  Dx 2006    no complications     Allergies   Allergen Reactions    Nkda [No Known Drug Allergy]      Outpatient Encounter Medications as of 5/10/2023   Medication Sig Dispense Refill    docusate sodium 100 MG Cap Take 100 mg by mouth 2 times a day. 60 Capsule 1    acetaminophen (TYLENOL) 325 MG Tab Take 650 mg by mouth every four hours as needed.      etonogestrel (NEXPLANON) 68 MG Implant implant Inject 1 Each under the skin one time.       No facility-administered encounter medications on file as of 5/10/2023.     Social History     Socioeconomic History    Marital status: Single     Spouse name: Not on file    Number of children: Not on file    Years of education: Not on file    Highest education level: Not on file   Occupational History    Not on file   Tobacco Use    Smoking status: Never    Smokeless tobacco: Never    Tobacco comments:     last used 9 years ago   Vaping Use    Vaping Use: Never used   Substance and Sexual Activity    Alcohol use: No     Comment: socially    Drug use: No    Sexual activity: Yes     Partners: Male     Birth control/protection: Condom   Other Topics Concern    Not on file   Social History Narrative    Not on file     Social Determinants of Health     Financial Resource Strain: Not on file   Food Insecurity: Not on file   Transportation Needs: Not on file   Physical Activity: Not on file   Stress: Not on file   Social Connections: Not on file   Intimate Partner Violence: Not on file   Housing Stability: Not on file      Social History     Tobacco Use    Smoking Status Never   Smokeless Tobacco Never   Tobacco Comments    last used 9 years ago   Vaping Use    Vaping Use: Never used     Social History     Substance and Sexual Activity   Alcohol Use No    Comment: socially     Social History     Substance and Sexual Activity   Drug Use No      Family History   Problem Relation Age of Onset    Heart Disease Mother 62        hearth attack    Diabetes Mother         pills    Hypertension Mother     Cancer Father         Lung    Alcohol/Drug Father         alcoholic    Thyroid Sister     Cancer Brother         Lung    Diabetes Brother         diet     Review of Systems   Constitutional:  Negative for chills, fever, malaise/fatigue and weight loss.   HENT:  Negative for congestion, ear discharge, ear pain, hearing loss and nosebleeds.    Eyes:  Negative for blurred vision, double vision, photophobia, pain and discharge.   Respiratory:  Negative for cough, hemoptysis and sputum production.    Cardiovascular:  Negative for chest pain, palpitations, orthopnea and claudication.   Gastrointestinal:  Negative for abdominal pain, constipation, diarrhea, heartburn, nausea and vomiting.   Genitourinary:  Negative for dysuria, frequency, hematuria and urgency.   Musculoskeletal:  Negative for back pain, joint pain, myalgias and neck pain.   Skin:  Negative for itching and rash.   Neurological:  Negative for dizziness, tingling, tremors, sensory change, speech change, focal weakness and headaches.   Endo/Heme/Allergies:  Negative for environmental allergies. Does not bruise/bleed easily.   Psychiatric/Behavioral:  Negative for depression, hallucinations, substance abuse and suicidal ideas. The patient is not nervous/anxious.         Objective:   /68 (BP Location: Right arm, Patient Position: Sitting, BP Cuff Size: Adult)   Pulse 83   Temp 36.7 °C (98 °F) (Temporal)   Wt 62.6 kg (138 lb)   LMP 12/01/2022 (Approximate)   SpO2 99%   BMI 26.07 kg/m²     Physical  Exam  Constitutional:       General: She is not in acute distress.  HENT:      Head: Normocephalic and atraumatic.   Eyes:      General: No scleral icterus.  Cardiovascular:      Rate and Rhythm: Normal rate and regular rhythm.   Pulmonary:      Effort: No respiratory distress.   Abdominal:      Palpations: Abdomen is soft.   Musculoskeletal:      Cervical back: Normal range of motion.      Comments: Incision over the right gluteal muscle extending down to the right posterior thigh appears well intact and healing well.  There is no evidence of fluid drainage or infection.  The CAMRYN drain was removed today in clinic with a scant amount of serous output within the drain bulb.   Neurological:      Mental Status: She is alert.         Labs   Latest Reference Range & Units 04/16/23 03:39   WBC 4.8 - 10.8 K/uL 4.2 (L)   RBC 4.20 - 5.40 M/uL 4.43   Hemoglobin 12.0 - 16.0 g/dL 11.4 (L)   Hematocrit 37.0 - 47.0 % 35.5 (L)   MCV 81.4 - 97.8 fL 80.1 (L)   MCH 27.0 - 33.0 pg 25.7 (L)   MCHC 33.6 - 35.0 g/dL 32.1 (L)   RDW 35.9 - 50.0 fL 43.4   Platelet Count 164 - 446 K/uL 157 (L)   MPV 9.0 - 12.9 fL 10.5   (L): Data is abnormally low     Latest Reference Range & Units 04/16/23 03:39   Sodium 135 - 145 mmol/L 140   Potassium 3.6 - 5.5 mmol/L 3.8   Chloride 96 - 112 mmol/L 107   Co2 20 - 33 mmol/L 23   Anion Gap 7.0 - 16.0  10.0   Glucose 65 - 99 mg/dL 106 (H)   Bun 8 - 22 mg/dL 10   Creatinine 0.50 - 1.40 mg/dL 0.51   GFR (CKD-EPI) >60 mL/min/1.73 m 2 118   Calcium 8.5 - 10.5 mg/dL 9.1   Correct Calcium 8.5 - 10.5 mg/dL 9.3   AST(SGOT) 12 - 45 U/L 34   ALT(SGPT) 2 - 50 U/L 66 (H)   Alkaline Phosphatase 30 - 99 U/L 122 (H)   Total Bilirubin 0.1 - 1.5 mg/dL 0.3   Albumin 3.2 - 4.9 g/dL 3.7   Total Protein 6.0 - 8.2 g/dL 7.0   Globulin 1.9 - 3.5 g/dL 3.3   A-G Ratio g/dL 1.1   (H): Data is abnormally high     Imaging  CT pelvis with contrast ([facility]) 11/1/2022:Fairly well-circumscribed ovoid mass in the right gluteus denise muscle  "measuring 15 x 9 x 6 cm.  Characterized by areas of mostly fluid density but some fatty components are seen as well.  No extension beyond the confines of the muscle is seen.  Some fat is seen peripherally to the margins.  No lymphadenopathy.     MRI femur with and without contrast right ([facility]) 11/2/2022: Large soft tissue mass involving the right buttock inferiorly which extends into the right upper posterior thigh.  Appears to involve the gluteus medius denise muscle as well as contact the hamstrings muscle.  Measures 16 x 10 x 7.3 cm in size and characterized by heterogeneous increased T2 signal with mixed intermediate and increased T1 signal with heterogeneous enhancement.  No evidence of adenopathy.    CT chest abdomen pelvis without contrast ([facility]) 11/2/2022: No noncontrast evidence of metastasis.  Right gluteus denise centered mass is unchanged.  \"Prior cholecystectomy.  Calcification the right lobe of the liver is indeterminate but may represent remote granulomatous disease.     CT chest abdomen pelvis with contrast 2/2/2023:  1.  Negative for metastatic disease within the chest, abdomen, or pelvis  2.  2.6 cm right adnexal cyst is likely physiologic in this age group  3.  Previous cholecystectomy  4.  Right gluteal intramuscular mass is considerably smaller now measuring 5.7 x 2.6 cm and previously 9.3 x 6.0 cm consistent with favorable response to therapy     MRI right femur with and without contrast 2/17/2023:  Interval decrease in size of a large right inferior buttock/upper thigh mass which involves the right gluteus denise muscle and contacts the hamstrings muscles. This currently measures 10 x 5.6 x 3.6 cm in size compared to previous measurement of 16 x   10 x 7.3 cm. This is again most consistent with myxoid variant liposarcoma.    Pathology  IR guided biopsy ([facility]) 11/12/2022:    right posterior thigh mass on core biopsy consistent with myxoid liposarcoma.  There is no round cell component " identified in the limited sample.     Surgical pathology from 4/13/2023:  A. Sarcoma:          Myxoid liposarcoma.     SOFT TISSUE: Resection   CLINICAL     Preresection Treatment:  Radiation therapy performed   SPECIMEN     Procedure:  Wide resection (per OP note)   TUMOR     Tumor Focality:  Unifocal     Tumor Site:  Trunk and extremities - right gluteus denise muscle     Tumor Size:  Greatest Dimension (Centimeters) - 7.2 cm     Histologic Type (WHO):  Myxoid liposarcoma       Percentage of Hypercellular Areas - 0%     Histologic Grade (FNCLCC):  Grade 1     Mitotic Rate:  0 mitoses per 10 high-power fields (HPF)     Necrosis (macroscopic or microscopic):  Not identified     Treatment Effect:  Not identified     Lymphovascular Invasion:  Not identified     Tumor Comment:  Per OP note, the tumor did shrink to half its size     after therapy but no histologic evidence is noted on slides.   MARGINS     Margin Status:  Tumor present at margin       Margin(s) Involved by Tumor:  Posterior margin is positive     Margin Comment:  Lateral margin is uninvolved by < 1/10 mm in     distance and medial margin is uninvolved by < 1/2 mm and anterior     margin is 3 mm in distance.  All other margins are at least 5 mm in     distance.   REGIONAL LYMPH NODES     Regional Lymph Node Status:  Not applicable (no regional lymph nodes     submitted or found)   PATHOLOGIC STAGE CLASSIFICATION (pTNM, AJCC 8th Edition)   Reporting of pT, pN, and (when applicable) pM categories is based on   information available to the pathologist at the time the report is   issued. As per the AJCC (Chapter 1, 8th Ed.) it is the managing   physician's responsibility to establish the final pathologic stage   based upon all pertinent information, including but potentially not   limited to this pathology report.     Pathologic Stage Classification:  Histologic type appropriate for     staging       TNM Descriptors:  y (post-treatment)       pT Category:   pT2       pN Category:  pN not assigned (no nodes submitted or found)   SPECIAL STUDIES     Immunohistochemistry:  Not performed     Cytogenetics:  Not performed     Molecular Pathology:  Not performed   Comment(s):  There is adequate tumor present in multiple blocks (A4   specifically) for additional studies if clinically indicated. Per OP   note, 3 to 5 cm margins were thought to have been obtained.     Procedures  Operation 4/13/2023:  1.  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  Intraoperative ultrasound  3.  Complex layered closure      Diagnosis:     1. Myxoid liposarcoma (HCC)            Medical Decision Making:  Today's Assessment / Status / Plan:   Patient is a very pleasant 42-year-old female who presents for surgical evaluation for a newly identified right gluteal myxoid liposarcoma.  She has completed neoadjuvant radiation therapy and underwent resection on 4/13/2023.     She presents today for postoperative visit.  Her drain has been putting out minimal serous fluid which was removed today in clinic.  Her wound is healing well and she is slowly regaining her strength.  She continues to use a cane but is needing it less and less on a daily basis.    We again discussed her final pathology.  I discussed our tumor board recommendations to initiate surveillance with no need for adjuvant therapy at this time.  We also discussed in tumor board the need for genetics referral given the myxoid liposarcoma which has been placed and she is aware that they will be reaching out to her.  Otherwise we will start surveillance with every 3 month MRIs of the right lower extremity and every 6 month CTs of the chest abdomen pelvis    I, Artie Rojas MD have entered, reviewed and confirmed the above diagnosis related to this patient on this date of service, May 10, 2023 12:55 PM    rAtie Rojas M.D.

## 2023-05-10 ENCOUNTER — OFFICE VISIT (OUTPATIENT)
Dept: SURGICAL ONCOLOGY | Facility: MEDICAL CENTER | Age: 43
End: 2023-05-10
Payer: COMMERCIAL

## 2023-05-10 VITALS
HEART RATE: 83 BPM | SYSTOLIC BLOOD PRESSURE: 122 MMHG | OXYGEN SATURATION: 99 % | DIASTOLIC BLOOD PRESSURE: 68 MMHG | BODY MASS INDEX: 26.07 KG/M2 | WEIGHT: 138 LBS | TEMPERATURE: 98 F

## 2023-05-10 DIAGNOSIS — C49.9 MYXOID LIPOSARCOMA (HCC): ICD-10-CM

## 2023-05-10 PROCEDURE — 99024 POSTOP FOLLOW-UP VISIT: CPT | Performed by: SURGERY

## 2023-05-10 ASSESSMENT — ENCOUNTER SYMPTOMS
DIARRHEA: 0
HEADACHES: 0
CONSTIPATION: 0
PALPITATIONS: 0
EYE PAIN: 0
HALLUCINATIONS: 0
COUGH: 0
TINGLING: 0
DIZZINESS: 0
BRUISES/BLEEDS EASILY: 0
NERVOUS/ANXIOUS: 0
WEIGHT LOSS: 0
PHOTOPHOBIA: 0
DEPRESSION: 0
HEMOPTYSIS: 0
FOCAL WEAKNESS: 0
ORTHOPNEA: 0
TREMORS: 0
SPEECH CHANGE: 0
NECK PAIN: 0
EYE DISCHARGE: 0
FEVER: 0
DOUBLE VISION: 0
SPUTUM PRODUCTION: 0
CLAUDICATION: 0
CHILLS: 0
SENSORY CHANGE: 0
MYALGIAS: 0
NAUSEA: 0
HEARTBURN: 0
BACK PAIN: 0
BLURRED VISION: 0
ABDOMINAL PAIN: 0
VOMITING: 0

## 2023-05-10 ASSESSMENT — LIFESTYLE VARIABLES: SUBSTANCE_ABUSE: 0

## 2023-05-10 ASSESSMENT — FIBROSIS 4 INDEX: FIB4 SCORE: 1.15

## 2023-06-01 NOTE — PROGRESS NOTES
Subjective:   6/6/2023  7:49 AM  Primary care physician: Pcp Pt States None  Referring Provider: CHERRI Gregory  Medical Oncologist: CHAZ Gregory  Radiation Oncology: Charles Delgado MD    Chief Complaint: Follow-up visit    Diagnosis:   1. Myxoid liposarcoma (HCC)        2. Surgery follow-up examination        3. Abnormal finding of diagnostic imaging          History of presenting illness:    Patient is a 42-year-old female otherwise healthy who presents for surgical evaluation for newly identified right posterior thigh myxoid liposarcoma.  The patient noted a small mass in her right gluteal region in June 2020.  Over the last few months it has started to increase significantly in size.  She not been symptomatic from it previously but it has started to cause her some discomfort with sitting down, driving, going to the bathroom.  She denies any pain, weakness, decreased sensation in the right lower extremity.  She was initially evaluated in our medical oncology clinic and a biopsy was performed.  The biopsy was consistent with a myxoid liposarcoma with no round cell component identified in the limited sample.     Patient presents today for surgical evaluation of newly identified myxoid liposarcoma of the right gluteus muscle extending down to the right posterior thigh.  She states that she does feel as if the mass is grown since her biopsy was performed.  She does has discomfort directly over the area when she sits down, drives, goes to the bathroom.  She denies any neuromuscular symptoms of the right lower extremity and appears to have full function of the extremity.  She denies any fevers or chills.  No recent weight loss.  Denies any masses in any other places.     Update 2/1/2023:   Patient presents today for follow-up post neoadjuvant radiation therapy.  She did well with therapy did not experience any significant complications and notes that she feels that the mass has shrunk significantly  since I originally saw her.  She completed radiation on 1/19/2023 with a total of 50 Gray in 25 fractions.  She has done remarkably well.  She denies any pain over the area of the mass.  She is scheduled for a CT of the chest abdomen and pelvis tomorrow.     Update 3/28/2023:  Patient presents today for preoperative discussion.  She was seen by plastic surgery and I had a discussion with Dr. Santizo in regards to the possible need for reconstruction for closure of the wound. Given the shrinkage of the tumor does feel that we might be able to get this wound closed.  She in general is doing well since I last saw her.  She does not feel any significant growth of the mass of the lesion and is otherwise recovered well from her radiation.     Update 4/25/2023:  Operation 4/13/2023:  1.  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  Intraoperative ultrasound  3.  Complex layered closure     Patient presents today for her first postoperative visit.  She has generally been doing well at home. Her drain is putting out approximately 40 to 50 cc a day of clear serous fluid.  She continues to be weak with ambulation but this is slowly improving and she is needing to use her cane less.  She is having a little bit of pain but it is well controlled with minimal need for pain medication.  She is otherwise tolerating a diet and denies any drainage or purulent material from her dressing.     Update 5/10/23  Tumor board discussion in regards to her final pathology and margins.  The ultimate decision of the tumor board was to initiate ongoing surveillance with no need for additional adjuvant therapy at this time.     Drain is putting out approximately 15 cc/day     She is here today for a postoperative follow-up visit.  She has been doing well.  She states her strength is continuing to improve and she is walking better and better each day.  Her drain output has been minimal and serous appearing.  She had no  issues with her wound. The plan is for genetic testing and no plan for adjuvant radiation at this time. She has followed up with [facility], and genetic was not recommended at the time. We will plan for to start surveillance with every 3 months MRI of the right lower extremity and 6 month CTs of the chest, abdomen and pelvis.    Update 6/6/23  She is here today for follow-up.  She is in general been doing very well since her surgery.  She is no longer walking with a cane.  She still does have some issues with bending over she feels weak when she does that but otherwise is nearly back to her normal state of health with the exception of some ongoing weakness and difficulty with bending.  She does still limp at baseline but this seems to be slowly improving as her strength improves.  She denies any issues with the wound.  She does have some numbness on the posterior aspect of her right thigh.    Past Medical History:   Diagnosis Date    Pregnant state, incidental      Past Surgical History:   Procedure Laterality Date    OH ULTRASONIC GUIDANCE, INTRAOPERATIVE  4/13/2023    Procedure: ULTRASOUND GUIDANCE;  Surgeon: Artie Rojas M.D.;  Location: SURGERY [facility];  Service: General    MASS EXCISION GENERAL  4/13/2023    Procedure: RADICAL RESECTION OF RIGHT GLUTEAL MASS;  Surgeon: Artie Rojas M.D.;  Location: SURGERY [facility];  Service: General    CHOLECYSTECTOMY  Dx 2006    no complications     Allergies   Allergen Reactions    Nkda [No Known Drug Allergy]      Outpatient Encounter Medications as of 6/6/2023   Medication Sig Dispense Refill    docusate sodium 100 MG Cap Take 100 mg by mouth 2 times a day. 60 Capsule 1    acetaminophen (TYLENOL) 325 MG Tab Take 650 mg by mouth every four hours as needed.      etonogestrel (NEXPLANON) 68 MG Implant implant Inject 1 Each under the skin one time.       No facility-administered encounter medications on file as of 6/6/2023.     Social History      Socioeconomic History    Marital status: Single     Spouse name: Not on file    Number of children: Not on file    Years of education: Not on file    Highest education level: Not on file   Occupational History    Not on file   Tobacco Use    Smoking status: Never    Smokeless tobacco: Never    Tobacco comments:     last used 9 years ago   Vaping Use    Vaping Use: Never used   Substance and Sexual Activity    Alcohol use: No     Comment: socially    Drug use: No    Sexual activity: Yes     Partners: Male     Birth control/protection: Condom   Other Topics Concern    Not on file   Social History Narrative    Not on file     Social Determinants of Health     Financial Resource Strain: Not on file   Food Insecurity: Not on file   Transportation Needs: Not on file   Physical Activity: Not on file   Stress: Not on file   Social Connections: Not on file   Intimate Partner Violence: Not on file   Housing Stability: Not on file      Social History     Tobacco Use   Smoking Status Never   Smokeless Tobacco Never   Tobacco Comments    last used 9 years ago   Vaping Use    Vaping Use: Never used     Social History     Substance and Sexual Activity   Alcohol Use No    Comment: socially     Social History     Substance and Sexual Activity   Drug Use No      Family History   Problem Relation Age of Onset    Heart Disease Mother 62        hearth attack    Diabetes Mother         pills    Hypertension Mother     Cancer Father         Lung    Alcohol/Drug Father         alcoholic    Thyroid Sister     Cancer Brother         Lung    Diabetes Brother         diet     Review of Systems   Constitutional:  Negative for chills, fever, malaise/fatigue and weight loss.   HENT:  Negative for congestion, ear discharge, ear pain, hearing loss and nosebleeds.    Eyes:  Negative for blurred vision, double vision, photophobia, pain and discharge.   Respiratory:  Negative for cough, hemoptysis and sputum production.    Cardiovascular:   Negative for chest pain, palpitations, orthopnea and claudication.   Gastrointestinal:  Negative for abdominal pain, constipation, diarrhea, heartburn, nausea and vomiting.   Genitourinary:  Negative for dysuria, frequency, hematuria and urgency.   Musculoskeletal:  Negative for back pain, joint pain, myalgias and neck pain.   Skin:  Negative for itching and rash.   Neurological:  Positive for weakness. Negative for dizziness, tingling, tremors, sensory change, speech change, focal weakness and headaches.   Endo/Heme/Allergies:  Negative for environmental allergies. Does not bruise/bleed easily.   Psychiatric/Behavioral:  Negative for depression, hallucinations, substance abuse and suicidal ideas. The patient is not nervous/anxious.         Objective:   /64 (BP Location: Right arm, Patient Position: Sitting, BP Cuff Size: Adult)   Pulse 78   Temp 36.4 °C (97.5 °F) (Temporal)   Wt 65.3 kg (144 lb)   SpO2 96%   BMI 27.21 kg/m²     Physical Exam  Constitutional:       General: She is not in acute distress.  HENT:      Head: Normocephalic and atraumatic.   Eyes:      General: No scleral icterus.  Cardiovascular:      Rate and Rhythm: Normal rate and regular rhythm.   Pulmonary:      Effort: No respiratory distress.   Abdominal:      Palpations: Abdomen is soft.   Musculoskeletal:      Cervical back: Normal range of motion.   Neurological:      Mental Status: She is alert.       Labs    Latest Reference Range & Units 04/16/23 03:39   WBC 4.8 - 10.8 K/uL 4.2 (L)   RBC 4.20 - 5.40 M/uL 4.43   Hemoglobin 12.0 - 16.0 g/dL 11.4 (L)   Hematocrit 37.0 - 47.0 % 35.5 (L)   MCV 81.4 - 97.8 fL 80.1 (L)   MCH 27.0 - 33.0 pg 25.7 (L)   MCHC 33.6 - 35.0 g/dL 32.1 (L)   RDW 35.9 - 50.0 fL 43.4   Platelet Count 164 - 446 K/uL 157 (L)   MPV 9.0 - 12.9 fL 10.5   (L): Data is abnormally low       Latest Reference Range & Units 04/16/23 03:39   Sodium 135 - 145 mmol/L 140   Potassium 3.6 - 5.5 mmol/L 3.8   Chloride 96 - 112  "mmol/L 107   Co2 20 - 33 mmol/L 23   Anion Gap 7.0 - 16.0  10.0   Glucose 65 - 99 mg/dL 106 (H)   Bun 8 - 22 mg/dL 10   Creatinine 0.50 - 1.40 mg/dL 0.51   GFR (CKD-EPI) >60 mL/min/1.73 m 2 118   Calcium 8.5 - 10.5 mg/dL 9.1   Correct Calcium 8.5 - 10.5 mg/dL 9.3   AST(SGOT) 12 - 45 U/L 34   ALT(SGPT) 2 - 50 U/L 66 (H)   Alkaline Phosphatase 30 - 99 U/L 122 (H)   Total Bilirubin 0.1 - 1.5 mg/dL 0.3   Albumin 3.2 - 4.9 g/dL 3.7   Total Protein 6.0 - 8.2 g/dL 7.0   Globulin 1.9 - 3.5 g/dL 3.3   A-G Ratio g/dL 1.1   (H): Data is abnormally high     Imaging  CT pelvis with contrast ([facility]) 11/1/2022:Fairly well-circumscribed ovoid mass in the right gluteus denise muscle measuring 15 x 9 x 6 cm.  Characterized by areas of mostly fluid density but some fatty components are seen as well.  No extension beyond the confines of the muscle is seen.  Some fat is seen peripherally to the margins.  No lymphadenopathy.     MRI femur with and without contrast right ([facility]) 11/2/2022: Large soft tissue mass involving the right buttock inferiorly which extends into the right upper posterior thigh.  Appears to involve the gluteus medius denise muscle as well as contact the hamstrings muscle.  Measures 16 x 10 x 7.3 cm in size and characterized by heterogeneous increased T2 signal with mixed intermediate and increased T1 signal with heterogeneous enhancement.  No evidence of adenopathy.    CT chest abdomen pelvis without contrast ([facility]) 11/2/2022: No noncontrast evidence of metastasis.  Right gluteus denise centered mass is unchanged.  \"Prior cholecystectomy.  Calcification the right lobe of the liver is indeterminate but may represent remote granulomatous disease.     CT chest abdomen pelvis with contrast 2/2/2023:  1.  Negative for metastatic disease within the chest, abdomen, or pelvis  2.  2.6 cm right adnexal cyst is likely physiologic in this age group  3.  Previous cholecystectomy  4.  Right gluteal intramuscular mass is " considerably smaller now measuring 5.7 x 2.6 cm and previously 9.3 x 6.0 cm consistent with favorable response to therapy     MRI right femur with and without contrast 2/17/2023:  Interval decrease in size of a large right inferior buttock/upper thigh mass which involves the right gluteus denise muscle and contacts the hamstrings muscles. This currently measures 10 x 5.6 x 3.6 cm in size compared to previous measurement of 16 x   10 x 7.3 cm. This is again most consistent with myxoid variant liposarcoma.     Pathology  IR guided biopsy ([facility]) 11/12/2022:    right posterior thigh mass on core biopsy consistent with myxoid liposarcoma.  There is no round cell component identified in the limited sample.     Surgical pathology from 4/13/2023:  A. Sarcoma:          Myxoid liposarcoma.     SOFT TISSUE: Resection   CLINICAL     Preresection Treatment:  Radiation therapy performed   SPECIMEN     Procedure:  Wide resection (per OP note)   TUMOR     Tumor Focality:  Unifocal     Tumor Site:  Trunk and extremities - right gluteus denise muscle     Tumor Size:  Greatest Dimension (Centimeters) - 7.2 cm     Histologic Type (WHO):  Myxoid liposarcoma       Percentage of Hypercellular Areas - 0%     Histologic Grade (FNCLCC):  Grade 1     Mitotic Rate:  0 mitoses per 10 high-power fields (HPF)     Necrosis (macroscopic or microscopic):  Not identified     Treatment Effect:  Not identified     Lymphovascular Invasion:  Not identified     Tumor Comment:  Per OP note, the tumor did shrink to half its size     after therapy but no histologic evidence is noted on slides.   MARGINS     Margin Status:  Tumor present at margin       Margin(s) Involved by Tumor:  Posterior margin is positive     Margin Comment:  Lateral margin is uninvolved by < 1/10 mm in     distance and medial margin is uninvolved by < 1/2 mm and anterior     margin is 3 mm in distance.  All other margins are at least 5 mm in     distance.   REGIONAL LYMPH NODES      Regional Lymph Node Status:  Not applicable (no regional lymph nodes     submitted or found)   PATHOLOGIC STAGE CLASSIFICATION (pTNM, AJCC 8th Edition)   Reporting of pT, pN, and (when applicable) pM categories is based on   information available to the pathologist at the time the report is   issued. As per the AJCC (Chapter 1, 8th Ed.) it is the managing   physician's responsibility to establish the final pathologic stage   based upon all pertinent information, including but potentially not   limited to this pathology report.     Pathologic Stage Classification:  Histologic type appropriate for     staging       TNM Descriptors:  y (post-treatment)       pT Category:  pT2       pN Category:  pN not assigned (no nodes submitted or found)   SPECIAL STUDIES     Immunohistochemistry:  Not performed     Cytogenetics:  Not performed     Molecular Pathology:  Not performed   Comment(s):  There is adequate tumor present in multiple blocks (A4   specifically) for additional studies if clinically indicated. Per OP   note, 3 to 5 cm margins were thought to have been obtained.      Procedures  Operation 4/13/2023:  1.  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  Intraoperative ultrasound  3.  Complex layered closure      Diagnosis:     1. Myxoid liposarcoma (HCC)        2. Surgery follow-up examination        3. Abnormal finding of diagnostic imaging            Medical Decision Making:  Today's Assessment / Status / Plan:   Patient is a very pleasant 42-year-old female who presents for surgical evaluation for a newly identified right gluteal myxoid liposarcoma.  She has completed neoadjuvant radiation therapy and underwent resection on 4/13/2023.    She presents today for a follow-up visit.  She in general has been doing very well.  Her strength is continuing to improve.  She still has issues with reaching down and bending over to pick things up.  Her job does entail her needing to be able to do  these things.  Therefore we will give her 4 additional weeks off to continue with her recovery.  I will see her back in 4 weeks.  Her surveillance imaging for 3 months from the time of resection has been ordered and it should be performed prior to her seeing me back in the office will be a surveillance/follow-up visit.    The patient asked several great questions which were answered to their satisfaction.  They  are in agreement with the care plan as outlined above.    I, Artie Rojas MD have entered, reviewed and confirmed the above diagnosis related to this patient on this date of service, June 7, 2023 9:29 AM      Artie Rojas MD  Surgical Oncology

## 2023-06-05 PROBLEM — Z09 SURGERY FOLLOW-UP EXAMINATION: Status: ACTIVE | Noted: 2023-06-05

## 2023-06-06 ENCOUNTER — OFFICE VISIT (OUTPATIENT)
Dept: SURGICAL ONCOLOGY | Facility: MEDICAL CENTER | Age: 43
End: 2023-06-06
Payer: COMMERCIAL

## 2023-06-06 VITALS
BODY MASS INDEX: 27.21 KG/M2 | HEART RATE: 78 BPM | OXYGEN SATURATION: 96 % | WEIGHT: 144 LBS | DIASTOLIC BLOOD PRESSURE: 64 MMHG | TEMPERATURE: 97.5 F | SYSTOLIC BLOOD PRESSURE: 110 MMHG

## 2023-06-06 DIAGNOSIS — R93.89 ABNORMAL FINDING OF DIAGNOSTIC IMAGING: ICD-10-CM

## 2023-06-06 DIAGNOSIS — C49.9 MYXOID LIPOSARCOMA (HCC): ICD-10-CM

## 2023-06-06 DIAGNOSIS — Z09 SURGERY FOLLOW-UP EXAMINATION: ICD-10-CM

## 2023-06-06 PROCEDURE — 3074F SYST BP LT 130 MM HG: CPT | Performed by: SURGERY

## 2023-06-06 PROCEDURE — 99024 POSTOP FOLLOW-UP VISIT: CPT | Performed by: SURGERY

## 2023-06-06 PROCEDURE — 3078F DIAST BP <80 MM HG: CPT | Performed by: SURGERY

## 2023-06-06 ASSESSMENT — ENCOUNTER SYMPTOMS
BRUISES/BLEEDS EASILY: 0
DIZZINESS: 0
DIARRHEA: 0
EYE DISCHARGE: 0
FEVER: 0
SPEECH CHANGE: 0
CHILLS: 0
PHOTOPHOBIA: 0
NECK PAIN: 0
HEMOPTYSIS: 0
BLURRED VISION: 0
MYALGIAS: 0
WEIGHT LOSS: 0
WEAKNESS: 1
TINGLING: 0
COUGH: 0
HEARTBURN: 0
SENSORY CHANGE: 0
PALPITATIONS: 0
NAUSEA: 0
CLAUDICATION: 0
EYE PAIN: 0
HALLUCINATIONS: 0
NERVOUS/ANXIOUS: 0
CONSTIPATION: 0
ORTHOPNEA: 0
BACK PAIN: 0
FOCAL WEAKNESS: 0
ABDOMINAL PAIN: 0
HEADACHES: 0
DOUBLE VISION: 0
SPUTUM PRODUCTION: 0
DEPRESSION: 0
VOMITING: 0
TREMORS: 0

## 2023-06-06 ASSESSMENT — FIBROSIS 4 INDEX: FIB4 SCORE: 1.15

## 2023-06-06 ASSESSMENT — LIFESTYLE VARIABLES: SUBSTANCE_ABUSE: 0

## 2023-06-12 NOTE — PROGRESS NOTES
Subjective:   7/5/2023 10:01 AM  Primary care physician: Pcp Pt States None  Referring Provider: CHERRI Gregory  Medical Oncologist: CHAZ Gregory  Radiation Oncology: Charles Delgado MD    Chief Complaint: Follow-up visit    Diagnosis:   1. Myxoid liposarcoma (HCC)        2. R gluteal pain and mass/swellinh        3. Abnormal finding of diagnostic imaging        4. Surgery follow-up examination          History of presenting illness:    Patient is a 42-year-old female otherwise healthy who presents for surgical evaluation for newly identified right posterior thigh myxoid liposarcoma.  The patient noted a small mass in her right gluteal region in June 2020.  Over the last few months it has started to increase significantly in size.  She not been symptomatic from it previously but it has started to cause her some discomfort with sitting down, driving, going to the bathroom.  She denies any pain, weakness, decreased sensation in the right lower extremity.  She was initially evaluated in our medical oncology clinic and a biopsy was performed.  The biopsy was consistent with a myxoid liposarcoma with no round cell component identified in the limited sample.     Patient presents today for surgical evaluation of newly identified myxoid liposarcoma of the right gluteus muscle extending down to the right posterior thigh.  She states that she does feel as if the mass is grown since her biopsy was performed.  She does has discomfort directly over the area when she sits down, drives, goes to the bathroom.  She denies any neuromuscular symptoms of the right lower extremity and appears to have full function of the extremity.  She denies any fevers or chills.  No recent weight loss.  Denies any masses in any other places.     Update 2/1/2023:   Patient presents today for follow-up post neoadjuvant radiation therapy.  She did well with therapy did not experience any significant complications and notes that she  feels that the mass has shrunk significantly since I originally saw her.  She completed radiation on 1/19/2023 with a total of 50 Gray in 25 fractions.  She has done remarkably well.  She denies any pain over the area of the mass.  She is scheduled for a CT of the chest abdomen and pelvis tomorrow.     Update 3/28/2023:  Patient presents today for preoperative discussion.  She was seen by plastic surgery and I had a discussion with Dr. Santizo in regards to the possible need for reconstruction for closure of the wound. Given the shrinkage of the tumor does feel that we might be able to get this wound closed.  She in general is doing well since I last saw her.  She does not feel any significant growth of the mass of the lesion and is otherwise recovered well from her radiation.     Update 4/25/2023:  Operation 4/13/2023:  1.  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  Intraoperative ultrasound  3.  Complex layered closure     Patient presents today for her first postoperative visit.  She has generally been doing well at home. Her drain is putting out approximately 40 to 50 cc a day of clear serous fluid.  She continues to be weak with ambulation but this is slowly improving and she is needing to use her cane less.  She is having a little bit of pain but it is well controlled with minimal need for pain medication.  She is otherwise tolerating a diet and denies any drainage or purulent material from her dressing.     Update 5/10/23  Tumor board discussion in regards to her final pathology and margins.  The ultimate decision of the tumor board was to initiate ongoing surveillance with no need for additional adjuvant therapy at this time.     Drain is putting out approximately 15 cc/day     She is here today for a postoperative follow-up visit.  She has been doing well.  She states her strength is continuing to improve and she is walking better and better each day.  Her drain output has been  minimal and serous appearing.  She had no issues with her wound. The plan is for genetic testing and no plan for adjuvant radiation at this time. She has followed up with [facility], and genetic was not recommended at the time. We will plan for to start surveillance with every 3 months MRI of the right lower extremity and 6 month CTs of the chest, abdomen and pelvis.     Update 6/6/23  She is here today for follow-up.  She is in general been doing very well since her surgery.  She is no longer walking with a cane.  She still does have some issues with bending over she feels weak when she does that but otherwise is nearly back to her normal state of health with the exception of some ongoing weakness and difficulty with bending.  She does still limp at baseline but this seems to be slowly improving as her strength improves. She denies any issues with the wound.  She does have some numbness on the posterior aspect of her right thigh.     Update 7/5/23    She is here today for postoperative visit.  She has been doing incredibly well since surgery.  States she has essentially no deficit at this point.  She is walking normal.  No use of any assistance device.  She feels like she is back to her normal state of health and functionally has no deficits on the right side.  She does have some pain over the medial aspect of the surgical site which has been stable.  She intermittently will take Tylenol or ibuprofen but does not take anything around-the-clock.  She otherwise is doing well.  She would like to go back to work 2 weeks.    Past Medical History:   Diagnosis Date    Pregnant state, incidental      Past Surgical History:   Procedure Laterality Date    [place] ULTRASONIC GUIDANCE, INTRAOPERATIVE  4/13/2023    Procedure: ULTRASOUND GUIDANCE;  Surgeon: Artie Rojas M.D.;  Location: SURGERY [facility];  Service: General    MASS EXCISION GENERAL  4/13/2023    Procedure: RADICAL RESECTION OF RIGHT GLUTEAL MASS;   Surgeon: Artie Rojas M.D.;  Location: SURGERY [facility];  Service: General    CHOLECYSTECTOMY  Dx 2006    no complications     Allergies   Allergen Reactions    Nkda [No Known Drug Allergy]      Outpatient Encounter Medications as of 7/5/2023   Medication Sig Dispense Refill    docusate sodium 100 MG Cap Take 100 mg by mouth 2 times a day. 60 Capsule 1    acetaminophen (TYLENOL) 325 MG Tab Take 650 mg by mouth every four hours as needed.      etonogestrel (NEXPLANON) 68 MG Implant implant Inject 1 Each under the skin one time.       No facility-administered encounter medications on file as of 7/5/2023.     Social History     Socioeconomic History    Marital status: Single     Spouse name: Not on file    Number of children: Not on file    Years of education: Not on file    Highest education level: Not on file   Occupational History    Not on file   Tobacco Use    Smoking status: Never    Smokeless tobacco: Never    Tobacco comments:     last used 9 years ago   Vaping Use    Vaping Use: Never used   Substance and Sexual Activity    Alcohol use: No     Comment: socially    Drug use: No    Sexual activity: Yes     Partners: Male     Birth control/protection: Condom   Other Topics Concern    Not on file   Social History Narrative    Not on file     Social Determinants of Health     Financial Resource Strain: Not on file   Food Insecurity: Not on file   Transportation Needs: Not on file   Physical Activity: Not on file   Stress: Not on file   Social Connections: Not on file   Intimate Partner Violence: Not on file   Housing Stability: Not on file      Social History     Tobacco Use   Smoking Status Never   Smokeless Tobacco Never   Tobacco Comments    last used 9 years ago   Vaping Use    Vaping Use: Never used     Social History     Substance and Sexual Activity   Alcohol Use No    Comment: socially     Social History     Substance and Sexual Activity   Drug Use No      Family History   Problem Relation Age of  Onset    Heart Disease Mother 62        hearth attack    Diabetes Mother         pills    Hypertension Mother     Cancer Father         Lung    Alcohol/Drug Father         alcoholic    Thyroid Sister     Cancer Brother         Lung    Diabetes Brother         diet     Review of Systems   Constitutional:  Negative for chills, fever, malaise/fatigue and weight loss.   HENT:  Negative for congestion, ear discharge, ear pain, hearing loss and nosebleeds.    Eyes:  Negative for blurred vision, double vision, photophobia, pain and discharge.   Respiratory:  Negative for cough, hemoptysis and sputum production.    Cardiovascular:  Negative for chest pain, palpitations, orthopnea and claudication.   Gastrointestinal:  Negative for abdominal pain, constipation, diarrhea, heartburn, nausea and vomiting.   Genitourinary:  Negative for dysuria, frequency, hematuria and urgency.   Musculoskeletal:  Negative for back pain, joint pain, myalgias and neck pain.   Skin:  Negative for itching and rash.   Neurological:  Negative for dizziness, tingling, tremors, sensory change, speech change, focal weakness and headaches.   Endo/Heme/Allergies:  Negative for environmental allergies. Does not bruise/bleed easily.   Psychiatric/Behavioral:  Negative for depression, hallucinations, substance abuse and suicidal ideas. The patient is not nervous/anxious.         Objective:   /64 (BP Location: Right arm, Patient Position: Sitting, BP Cuff Size: Adult)   Pulse 77   Temp 36.4 °C (97.5 °F) (Temporal)   Wt 68 kg (150 lb)   SpO2 98%   BMI 28.34 kg/m²     Physical Exam  Constitutional:       General: She is not in acute distress.  HENT:      Head: Normocephalic and atraumatic.   Eyes:      General: No scleral icterus.  Cardiovascular:      Rate and Rhythm: Normal rate and regular rhythm.   Pulmonary:      Effort: No respiratory distress.   Abdominal:      Palpations: Abdomen is soft.   Musculoskeletal:      Cervical back: Normal range  of motion.      Comments: Range of motion of the right lower extremity.  No deficit noted while walking.  Neurovascularly intact.  Surgical incision is well-healed.  There is some tenderness over the sciatic notch but no obvious mass.   Neurological:      Mental Status: She is alert.         Labs    Latest Reference Range & Units 04/16/23 03:39   WBC 4.8 - 10.8 K/uL 4.2 (L)   RBC 4.20 - 5.40 M/uL 4.43   Hemoglobin 12.0 - 16.0 g/dL 11.4 (L)   Hematocrit 37.0 - 47.0 % 35.5 (L)   MCV 81.4 - 97.8 fL 80.1 (L)   MCH 27.0 - 33.0 pg 25.7 (L)   MCHC 33.6 - 35.0 g/dL 32.1 (L)   RDW 35.9 - 50.0 fL 43.4   Platelet Count 164 - 446 K/uL 157 (L)   MPV 9.0 - 12.9 fL 10.5   (L): Data is abnormally low       Latest Reference Range & Units 04/16/23 03:39   Sodium 135 - 145 mmol/L 140   Potassium 3.6 - 5.5 mmol/L 3.8   Chloride 96 - 112 mmol/L 107   Co2 20 - 33 mmol/L 23   Anion Gap 7.0 - 16.0  10.0   Glucose 65 - 99 mg/dL 106 (H)   Bun 8 - 22 mg/dL 10   Creatinine 0.50 - 1.40 mg/dL 0.51   GFR (CKD-EPI) >60 mL/min/1.73 m 2 118   Calcium 8.5 - 10.5 mg/dL 9.1   Correct Calcium 8.5 - 10.5 mg/dL 9.3   AST(SGOT) 12 - 45 U/L 34   ALT(SGPT) 2 - 50 U/L 66 (H)   Alkaline Phosphatase 30 - 99 U/L 122 (H)   Total Bilirubin 0.1 - 1.5 mg/dL 0.3   Albumin 3.2 - 4.9 g/dL 3.7   Total Protein 6.0 - 8.2 g/dL 7.0   Globulin 1.9 - 3.5 g/dL 3.3   A-G Ratio g/dL 1.1   (H): Data is abnormally high     Imaging  CT CHEST/ABDOMEN/PELVIS (8/10/23)  scheduled    MR FEMUR (8/10/23)  scheduled    CT pelvis with contrast ([facility]) 11/1/2022:  Fairly well-circumscribed ovoid mass in the right gluteus denise muscle measuring 15 x 9 x 6 cm.  Characterized by areas of mostly fluid density but some fatty components are seen as well.  No extension beyond the confines of the muscle is seen.  Some fat is seen peripherally to the margins.  No lymphadenopathy.     MRI femur with and without contrast right ([facility]) 11/2/2022:   Large soft tissue mass involving the right buttock  "inferiorly which extends into the right upper posterior thigh.  Appears to involve the gluteus medius denise muscle as well as contact the hamstrings muscle.  Measures 16 x 10 x 7.3 cm in size and characterized by heterogeneous increased T2 signal with mixed intermediate and increased T1 signal with heterogeneous enhancement.  No evidence of adenopathy.    CT chest abdomen pelvis without contrast ([facility]) 11/2/2022: No contrast evidence of metastasis.  Right gluteus denise centered mass is unchanged.  \"Prior cholecystectomy.  Calcification the right lobe of the liver is indeterminate but may represent remote granulomatous disease.     CT chest abdomen pelvis with contrast 2/2/2023:  1.  Negative for metastatic disease within the chest, abdomen, or pelvis  2.  2.6 cm right adnexal cyst is likely physiologic in this age group  3.  Previous cholecystectomy  4.  Right gluteal intramuscular mass is considerably smaller now measuring 5.7 x 2.6 cm and previously 9.3 x 6.0 cm consistent with favorable response to therapy     MRI right femur with and without contrast 2/17/2023:  Interval decrease in size of a large right inferior buttock/upper thigh mass which involves the right gluteus denise muscle and contacts the hamstrings muscles. This currently measures 10 x 5.6 x 3.6 cm in size compared to previous measurement of 16 x   10 x 7.3 cm. This is again most consistent with myxoid variant liposarcoma.     Pathology  IR guided biopsy ([facility]) 11/12/2022:   Right posterior thigh mass on core biopsy consistent with myxoid liposarcoma.  There is no round cell component identified in the limited sample.     Surgical pathology from 4/13/2023:  A. Sarcoma:          Myxoid liposarcoma.     SOFT TISSUE: Resection   CLINICAL     Preresection Treatment:  Radiation therapy performed   SPECIMEN     Procedure:  Wide resection (per OP note)   TUMOR     Tumor Focality:  Unifocal     Tumor Site:  Trunk and extremities - right gluteus " denise muscle     Tumor Size:  Greatest Dimension (Centimeters) - 7.2 cm     Histologic Type (WHO):  Myxoid liposarcoma       Percentage of Hypercellular Areas - 0%     Histologic Grade (FNCLCC):  Grade 1     Mitotic Rate:  0 mitoses per 10 high-power fields (HPF)     Necrosis (macroscopic or microscopic):  Not identified     Treatment Effect:  Not identified     Lymphovascular Invasion:  Not identified     Tumor Comment:  Per OP note, the tumor did shrink to half its size     after therapy but no histologic evidence is noted on slides.   MARGINS     Margin Status:  Tumor present at margin       Margin(s) Involved by Tumor:  Posterior margin is positive     Margin Comment:  Lateral margin is uninvolved by < 1/10 mm in     distance and medial margin is uninvolved by < 1/2 mm and anterior     margin is 3 mm in distance.  All other margins are at least 5 mm in     distance.   REGIONAL LYMPH NODES     Regional Lymph Node Status:  Not applicable (no regional lymph nodes     submitted or found)   PATHOLOGIC STAGE CLASSIFICATION (pTNM, AJCC 8th Edition)   Reporting of pT, pN, and (when applicable) pM categories is based on   information available to the pathologist at the time the report is   issued. As per the AJCC (Chapter 1, 8th Ed.) it is the managing   physician's responsibility to establish the final pathologic stage   based upon all pertinent information, including but potentially not   limited to this pathology report.     Pathologic Stage Classification:  Histologic type appropriate for     staging       TNM Descriptors:  y (post-treatment)       pT Category:  pT2       pN Category:  pN not assigned (no nodes submitted or found)   SPECIAL STUDIES     Immunohistochemistry:  Not performed     Cytogenetics:  Not performed     Molecular Pathology:  Not performed   Comment(s):  There is adequate tumor present in multiple blocks (A4   specifically) for additional studies if clinically indicated. Per OP   note, 3 to 5  cm margins were thought to have been obtained.      Procedures  Operation 4/13/2023:  1.  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  Intraoperative ultrasound  3.  Complex layered closure    Diagnosis:     1. Myxoid liposarcoma (HCC)        2. R gluteal pain and mass/swellinh        3. Abnormal finding of diagnostic imaging        4. Surgery follow-up examination            Medical Decision Making:  Today's Assessment / Status / Plan:   Patient is a very pleasant 42-year-old female who presents for surgical evaluation for a newly identified right gluteal myxoid liposarcoma.  She has completed neoadjuvant radiation therapy and underwent resection on 4/13/2023.     She presents today for a follow-up visit.  She in general has been doing very well.  Her strength has essentially returned to baseline.  She has no issues with bending or reaching any longer.   She is going to work with physical therapy starting on 717.  She would like to return to work shortly after that.  I have given her a note stating it is okay for her to go back to work.  Her surveillance imaging is scheduled for approximately 1 month for now and will we will see her shortly after that to review it.    I, Artie Rojas MD have entered, reviewed and confirmed the above diagnosis related to this patient on this date of service, July 5, 2023 11:01 AM    Artie Rojas MD  July 5, 2023, 11:01 AM

## 2023-06-20 DIAGNOSIS — M79.18 GLUTEAL PAIN: ICD-10-CM

## 2023-07-05 ENCOUNTER — OFFICE VISIT (OUTPATIENT)
Dept: SURGICAL ONCOLOGY | Facility: MEDICAL CENTER | Age: 43
End: 2023-07-05
Payer: COMMERCIAL

## 2023-07-05 VITALS
WEIGHT: 150 LBS | SYSTOLIC BLOOD PRESSURE: 122 MMHG | HEART RATE: 77 BPM | BODY MASS INDEX: 28.34 KG/M2 | DIASTOLIC BLOOD PRESSURE: 64 MMHG | TEMPERATURE: 97.5 F | OXYGEN SATURATION: 98 %

## 2023-07-05 DIAGNOSIS — Z09 SURGERY FOLLOW-UP EXAMINATION: ICD-10-CM

## 2023-07-05 DIAGNOSIS — M79.18 GLUTEAL PAIN: ICD-10-CM

## 2023-07-05 DIAGNOSIS — R93.89 ABNORMAL FINDING OF DIAGNOSTIC IMAGING: ICD-10-CM

## 2023-07-05 DIAGNOSIS — C49.9 MYXOID LIPOSARCOMA (HCC): ICD-10-CM

## 2023-07-05 PROCEDURE — 3074F SYST BP LT 130 MM HG: CPT | Performed by: SURGERY

## 2023-07-05 PROCEDURE — 3078F DIAST BP <80 MM HG: CPT | Performed by: SURGERY

## 2023-07-05 PROCEDURE — 99024 POSTOP FOLLOW-UP VISIT: CPT | Performed by: SURGERY

## 2023-07-05 ASSESSMENT — ENCOUNTER SYMPTOMS
CONSTIPATION: 0
CLAUDICATION: 0
BACK PAIN: 0
DIARRHEA: 0
NAUSEA: 0
DIZZINESS: 0
CHILLS: 0
NECK PAIN: 0
EYE DISCHARGE: 0
TINGLING: 0
FOCAL WEAKNESS: 0
HEARTBURN: 0
TREMORS: 0
COUGH: 0
SENSORY CHANGE: 0
PALPITATIONS: 0
FEVER: 0
NERVOUS/ANXIOUS: 0
HEMOPTYSIS: 0
ABDOMINAL PAIN: 0
SPUTUM PRODUCTION: 0
WEIGHT LOSS: 0
SPEECH CHANGE: 0
VOMITING: 0
BRUISES/BLEEDS EASILY: 0
HEADACHES: 0
PHOTOPHOBIA: 0
MYALGIAS: 0
HALLUCINATIONS: 0
ORTHOPNEA: 0
DEPRESSION: 0
EYE PAIN: 0
DOUBLE VISION: 0
BLURRED VISION: 0

## 2023-07-05 ASSESSMENT — FIBROSIS 4 INDEX: FIB4 SCORE: 1.15

## 2023-07-05 ASSESSMENT — LIFESTYLE VARIABLES: SUBSTANCE_ABUSE: 0

## 2023-08-08 ENCOUNTER — TELEPHONE (OUTPATIENT)
Dept: SURGICAL ONCOLOGY | Facility: MEDICAL CENTER | Age: 43
End: 2023-08-08
Payer: COMMERCIAL

## 2023-08-08 NOTE — TELEPHONE ENCOUNTER
Lft msg for pt to call back to schedule follow up with Dr. Rojas as she is scheduled for MRI and CT on 8/10 but has no follow up apptmt at this time.

## 2023-08-10 ENCOUNTER — HOSPITAL ENCOUNTER (OUTPATIENT)
Dept: RADIOLOGY | Facility: MEDICAL CENTER | Age: 43
End: 2023-08-10
Attending: SURGERY
Payer: COMMERCIAL

## 2023-08-10 DIAGNOSIS — C49.9 MYXOID LIPOSARCOMA (HCC): ICD-10-CM

## 2023-08-10 PROCEDURE — 700117 HCHG RX CONTRAST REV CODE 255: Performed by: SURGERY

## 2023-08-10 PROCEDURE — 71260 CT THORAX DX C+: CPT

## 2023-08-10 PROCEDURE — 73720 MRI LWR EXTREMITY W/O&W/DYE: CPT | Mod: RT

## 2023-08-10 PROCEDURE — A9579 GAD-BASE MR CONTRAST NOS,1ML: HCPCS | Performed by: SURGERY

## 2023-08-10 RX ADMIN — IOHEXOL 100 ML: 350 INJECTION, SOLUTION INTRAVENOUS at 14:55

## 2023-08-10 RX ADMIN — GADOTERIDOL 15 ML: 279.3 INJECTION, SOLUTION INTRAVENOUS at 14:20

## 2023-08-14 ENCOUNTER — TELEPHONE (OUTPATIENT)
Dept: SURGICAL ONCOLOGY | Facility: MEDICAL CENTER | Age: 43
End: 2023-08-14
Payer: COMMERCIAL

## 2023-08-14 NOTE — TELEPHONE ENCOUNTER
1026 08/14/23  Attempted to contact pt to schedule her an appointment with Dr. Rojas. [facility] for pt to call back.  Sandrita MORALES

## 2023-08-16 NOTE — PROGRESS NOTES
Subjective:   8/22/2023  7:54 AM  Primary care physician: Pcp Pt States None  Referring Provider: CHERRI Gregory  Medical Oncologist: CHAZ Gregory  Radiation Oncology: Charles Delgado MD    Chief Complaint:   Chief Complaint   Patient presents with    Establish Care     R GLUT MYX LIPOSARC  RESECTION 4/13  SP CHEMO  NEW CT + MR     Diagnosis:   1. Myxoid liposarcoma (HCC)        2. R gluteal pain and mass/swellinh        3. Abnormal finding of diagnostic imaging        4. Surgery follow-up examination          History of presenting illness:    Patient is a 42-year-old female otherwise healthy who presents for surgical evaluation for newly identified right posterior thigh myxoid liposarcoma.  The patient noted a small mass in her right gluteal region in June 2020.  Over the last few months it has started to increase significantly in size.  She not been symptomatic from it previously but it has started to cause her some discomfort with sitting down, driving, going to the bathroom.  She denies any pain, weakness, decreased sensation in the right lower extremity.  She was initially evaluated in our medical oncology clinic and a biopsy was performed.  The biopsy was consistent with a myxoid liposarcoma with no round cell component identified in the limited sample.     Patient presents today for surgical evaluation of newly identified myxoid liposarcoma of the right gluteus muscle extending down to the right posterior thigh.  She states that she does feel as if the mass is grown since her biopsy was performed.  She does has discomfort directly over the area when she sits down, drives, goes to the bathroom.  She denies any neuromuscular symptoms of the right lower extremity and appears to have full function of the extremity.  She denies any fevers or chills.  No recent weight loss.  Denies any masses in any other places.     Update 2/1/2023:   Patient presents today for follow-up post neoadjuvant  radiation therapy.  She did well with therapy did not experience any significant complications and notes that she feels that the mass has shrunk significantly since I originally saw her.  She completed radiation on 1/19/2023 with a total of 50 Gray in 25 fractions.  She has done remarkably well.  She denies any pain over the area of the mass.  She is scheduled for a CT of the chest abdomen and pelvis tomorrow.     Update 3/28/2023:  Patient presents today for preoperative discussion.  She was seen by plastic surgery and I had a discussion with Dr. Santizo in regards to the possible need for reconstruction for closure of the wound. Given the shrinkage of the tumor does feel that we might be able to get this wound closed.  She in general is doing well since I last saw her.  She does not feel any significant growth of the mass of the lesion and is otherwise recovered well from her radiation.     Update 4/25/2023:  Operation 4/13/2023:  1.  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  Intraoperative ultrasound  3.  Complex layered closure     Patient presents today for her first postoperative visit.  She has generally been doing well at home. Her drain is putting out approximately 40 to 50 cc a day of clear serous fluid.  She continues to be weak with ambulation but this is slowly improving and she is needing to use her cane less.  She is having a little bit of pain but it is well controlled with minimal need for pain medication.  She is otherwise tolerating a diet and denies any drainage or purulent material from her dressing.     Update 5/10/23  Tumor board discussion in regards to her final pathology and margins.  The ultimate decision of the tumor board was to initiate ongoing surveillance with no need for additional adjuvant therapy at this time.     Drain is putting out approximately 15 cc/day     She is here today for a postoperative follow-up visit.  She has been doing well.  She states  her strength is continuing to improve and she is walking better and better each day.  Her drain output has been minimal and serous appearing.  She had no issues with her wound. The plan is for genetic testing and no plan for adjuvant radiation at this time. She has followed up with [facility], and genetic was not recommended at the time. We will plan for to start surveillance with every 3 months MRI of the right lower extremity and 6 month CTs of the chest, abdomen and pelvis.     Update 6/6/23  She is here today for follow-up.  She is in general been doing very well since her surgery.  She is no longer walking with a cane.  She still does have some issues with bending over she feels weak when she does that but otherwise is nearly back to her normal state of health with the exception of some ongoing weakness and difficulty with bending.  She does still limp at baseline but this seems to be slowly improving as her strength improves. She denies any issues with the wound.  She does have some numbness on the posterior aspect of her right thigh.     Update 7/5/23  She is here today for postoperative visit.  She has been doing incredibly well since surgery.  States she has essentially no deficit at this point.  She is walking normal. No use of any assistance device.  She feels like she is back to her normal state of health and functionally has no deficits on the right side.  She does have some pain over the medial aspect of the surgical site which has been stable.  She intermittently will take Tylenol or ibuprofen but does not take anything around-the-clock.  She otherwise is doing well.  She would like to go back to work in two weeks.    Update 8/22/23  CT ABDOMEN/CHEST/PELVIS on 8/10/23 noted no definitive evidence of metastatic disease in the chest, abdomen or pelvis. Ill-defined soft tissue at the right gluteal mass resection site measures 4.1 cm.    MR FEMUR on 8/10/23 noted interval resection of a right upper  posterior thigh/inferior buttock mass. There is enhancement along the surgical course which also involves the gluteus denise muscle inferiorly, quadratus femoris muscle, upper hamstrings and right upper posterior vastus lateralis and vastus intermedius muscles. There is also a rim-enhancing fluid collection at the surgical site measuring 2.8 x 1.8 x 1.5 cm in size. No evidence of underlying bony abnormality.    She is here today for follow-up.  She had her first surveillance imaging completed this last week.  We did review her imaging.  She is doing incredibly well.  She is been working with physical therapy her strength been returning and she has returned to work at this point.  She still feels some residual weakness but it is improving.  Denies any pain new lesions lumps bumps or abnormalities.    Past Medical History:   Diagnosis Date    Pregnant state, incidental      Past Surgical History:   Procedure Laterality Date    IA ULTRASONIC GUIDANCE, INTRAOPERATIVE  4/13/2023    Procedure: ULTRASOUND GUIDANCE;  Surgeon: Artie Rojas M.D.;  Location: SURGERY [facility];  Service: General    MASS EXCISION GENERAL  4/13/2023    Procedure: RADICAL RESECTION OF RIGHT GLUTEAL MASS;  Surgeon: Artie Rojas M.D.;  Location: SURGERY [facility];  Service: General    CHOLECYSTECTOMY  Dx 2006    no complications     Allergies   Allergen Reactions    Nkda [No Known Drug Allergy]      Outpatient Encounter Medications as of 8/22/2023   Medication Sig Dispense Refill    docusate sodium 100 MG Cap Take 100 mg by mouth 2 times a day. 60 Capsule 1    acetaminophen (TYLENOL) 325 MG Tab Take 650 mg by mouth every four hours as needed.      etonogestrel (NEXPLANON) 68 MG Implant implant Inject 1 Each under the skin one time.       No facility-administered encounter medications on file as of 8/22/2023.     Social History     Socioeconomic History    Marital status: Single     Spouse name: Not on file    Number of children:  Not on file    Years of education: Not on file    Highest education level: Not on file   Occupational History    Not on file   Tobacco Use    Smoking status: Never    Smokeless tobacco: Never    Tobacco comments:     last used 9 years ago   Vaping Use    Vaping Use: Never used   Substance and Sexual Activity    Alcohol use: No     Comment: socially    Drug use: No    Sexual activity: Yes     Partners: Male     Birth control/protection: Condom   Other Topics Concern    Not on file   Social History Narrative    Not on file     Social Determinants of Health     Financial Resource Strain: Not on file   Food Insecurity: Not on file   Transportation Needs: Not on file   Physical Activity: Not on file   Stress: Not on file   Social Connections: Not on file   Intimate Partner Violence: Not on file   Housing Stability: Not on file      Social History     Tobacco Use   Smoking Status Never   Smokeless Tobacco Never   Tobacco Comments    last used 9 years ago     Social History     Substance and Sexual Activity   Alcohol Use No    Comment: socially     Social History     Substance and Sexual Activity   Drug Use No      Family History   Problem Relation Age of Onset    Heart Disease Mother 62        hearth attack    Diabetes Mother         pills    Hypertension Mother     Cancer Father         Lung    Alcohol/Drug Father         alcoholic    Thyroid Sister     Cancer Brother         Lung    Diabetes Brother         diet     Review of Systems   Constitutional:  Negative for chills, fever, malaise/fatigue and weight loss.   HENT:  Negative for congestion, ear discharge, ear pain, hearing loss and nosebleeds.    Eyes:  Negative for blurred vision, double vision, photophobia, pain and discharge.   Respiratory:  Negative for cough, hemoptysis and sputum production.    Cardiovascular:  Negative for chest pain, palpitations, orthopnea and claudication.   Gastrointestinal:  Negative for abdominal pain, constipation, diarrhea,  heartburn, nausea and vomiting.   Genitourinary:  Negative for dysuria, frequency, hematuria and urgency.   Musculoskeletal:  Negative for back pain, joint pain, myalgias and neck pain.   Skin:  Negative for itching and rash.   Neurological:  Negative for dizziness, tingling, tremors, sensory change, speech change, focal weakness and headaches.   Endo/Heme/Allergies:  Negative for environmental allergies. Does not bruise/bleed easily.   Psychiatric/Behavioral:  Negative for depression, hallucinations, substance abuse and suicidal ideas. The patient is not nervous/anxious.         Objective:   BP 90/60 (BP Location: Right arm, Patient Position: Sitting, BP Cuff Size: Adult)   Pulse 87   Temp 36.4 °C (97.5 °F) (Temporal)   Wt 67.1 kg (148 lb)   SpO2 98%   BMI 27.96 kg/m²     Physical Exam  Constitutional:       General: She is not in acute distress.  HENT:      Head: Normocephalic and atraumatic.   Eyes:      General: No scleral icterus.  Cardiovascular:      Rate and Rhythm: Normal rate and regular rhythm.   Pulmonary:      Effort: No respiratory distress.   Abdominal:      Palpations: Abdomen is soft.   Musculoskeletal:      Cervical back: Normal range of motion.      Comments: Scar along the right gluteal muscle is well-healed.  There is some residual defect in the area from the surgical resection however there is no palpable abnormality or mass.  Range of motion is fully intact.  Sensations intact.   Neurological:      Mental Status: She is alert.       Labs    Latest Reference Range & Units 04/16/23 03:39   WBC 4.8 - 10.8 K/uL 4.2 (L)   RBC 4.20 - 5.40 M/uL 4.43   Hemoglobin 12.0 - 16.0 g/dL 11.4 (L)   Hematocrit 37.0 - 47.0 % 35.5 (L)   MCV 81.4 - 97.8 fL 80.1 (L)   MCH 27.0 - 33.0 pg 25.7 (L)   MCHC 33.6 - 35.0 g/dL 32.1 (L)   RDW 35.9 - 50.0 fL 43.4   Platelet Count 164 - 446 K/uL 157 (L)   MPV 9.0 - 12.9 fL 10.5   (L): Data is abnormally low       Latest Reference Range & Units 04/16/23 03:39   Sodium  135 - 145 mmol/L 140   Potassium 3.6 - 5.5 mmol/L 3.8   Chloride 96 - 112 mmol/L 107   Co2 20 - 33 mmol/L 23   Anion Gap 7.0 - 16.0  10.0   Glucose 65 - 99 mg/dL 106 (H)   Bun 8 - 22 mg/dL 10   Creatinine 0.50 - 1.40 mg/dL 0.51   GFR (CKD-EPI) >60 mL/min/1.73 m 2 118   Calcium 8.5 - 10.5 mg/dL 9.1   Correct Calcium 8.5 - 10.5 mg/dL 9.3   AST(SGOT) 12 - 45 U/L 34   ALT(SGPT) 2 - 50 U/L 66 (H)   Alkaline Phosphatase 30 - 99 U/L 122 (H)   Total Bilirubin 0.1 - 1.5 mg/dL 0.3   Albumin 3.2 - 4.9 g/dL 3.7   Total Protein 6.0 - 8.2 g/dL 7.0   Globulin 1.9 - 3.5 g/dL 3.3   A-G Ratio g/dL 1.1   (H): Data is abnormally high     Imaging  CT CHEST/ABDOMEN/PELVIS (8/10/23)  IMPRESSION:  1.  No definitive evidence of metastatic disease in the chest, abdomen or pelvis.  2.  Ill-defined soft tissue at the right gluteal mass resection site measures 4.1 cm, possibly postoperative scarring. Attention on follow-up.  3.  Interval enlargement of triangular anterior mediastinal soft tissue, likely representing thymic rebound rather than mediastinal metastasis. Attention on follow-up.     MR FEMUR (8/10/23)  IMPRESSION:     1.  Interval resection of a right upper posterior thigh/inferior buttock mass. There is enhancement along the surgical course which also involves the gluteus denise muscle inferiorly, quadratus femoris muscle, upper hamstrings and right upper posterior   vastus lateralis and vastus intermedius muscles. This likely represents postsurgical scar. There is also a rim-enhancing fluid collection at the surgical site measuring 2.8 x 1.8 x 1.5 cm in size likely representing postsurgical hematoma or seroma. This   study will serve as a baseline for future interval follow-up.     2.  No evidence of underlying bony abnormality.     CT pelvis with contrast ([facility]) 11/1/2022:  Fairly well-circumscribed ovoid mass in the right gluteus denise muscle measuring 15 x 9 x 6 cm.  Characterized by areas of mostly fluid density but some  fatty components are seen as well.  No extension beyond the confines of the muscle is seen.  Some fat is seen peripherally to the margins.  No lymphadenopathy.     MRI femur with and without contrast right ([facility]) 11/2/2022:   Large soft tissue mass involving the right buttock inferiorly which extends into the right upper posterior thigh.  Appears to involve the gluteus medius denise muscle as well as contact the hamstrings muscle.  Measures 16 x 10 x 7.3 cm in size and characterized by heterogeneous increased T2 signal with mixed intermediate and increased T1 signal with heterogeneous enhancement.  No evidence of adenopathy.    CT chest abdomen pelvis without contrast ([facility]) 11/2/2022: No contrast evidence of metastasis.  Right gluteus denise centered mass is unchanged. Prior cholecystectomy.  Calcification the right lobe of the liver is indeterminate but may represent remote granulomatous disease.     CT chest abdomen pelvis with contrast 2/2/2023:  1.  Negative for metastatic disease within the chest, abdomen, or pelvis  2.  2.6 cm right adnexal cyst is likely physiologic in this age group  3.  Previous cholecystectomy  4.  Right gluteal intramuscular mass is considerably smaller now measuring 5.7 x 2.6 cm and previously 9.3 x 6.0 cm consistent with favorable response to therapy     MRI right femur with and without contrast 2/17/2023:  Interval decrease in size of a large right inferior buttock/upper thigh mass which involves the right gluteus denise muscle and contacts the hamstrings muscles. This currently measures 10 x 5.6 x 3.6 cm in size compared to previous measurement of 16 x 10 x 7.3 cm. This is again most consistent with myxoid variant liposarcoma.     Pathology  IR guided biopsy ([facility]) 11/12/2022:   Right posterior thigh mass on core biopsy consistent with myxoid liposarcoma.  There is no round cell component identified in the limited sample.     Surgical pathology from 4/13/2023:  A. Sarcoma:           Myxoid liposarcoma.     SOFT TISSUE: Resection   CLINICAL     Preresection Treatment:  Radiation therapy performed   SPECIMEN     Procedure:  Wide resection (per OP note)   TUMOR     Tumor Focality:  Unifocal     Tumor Site:  Trunk and extremities - right gluteus denise muscle     Tumor Size:  Greatest Dimension (Centimeters) - 7.2 cm     Histologic Type (WHO):  Myxoid liposarcoma       Percentage of Hypercellular Areas - 0%     Histologic Grade (FNCLCC):  Grade 1     Mitotic Rate:  0 mitoses per 10 high-power fields (HPF)     Necrosis (macroscopic or microscopic):  Not identified     Treatment Effect:  Not identified     Lymphovascular Invasion:  Not identified     Tumor Comment:  Per OP note, the tumor did shrink to half its size     after therapy but no histologic evidence is noted on slides.   MARGINS     Margin Status:  Tumor present at margin       Margin(s) Involved by Tumor:  Posterior margin is positive     Margin Comment:  Lateral margin is uninvolved by < 1/10 mm in     distance and medial margin is uninvolved by < 1/2 mm and anterior     margin is 3 mm in distance.  All other margins are at least 5 mm in     distance.   REGIONAL LYMPH NODES     Regional Lymph Node Status:  Not applicable (no regional lymph nodes     submitted or found)   PATHOLOGIC STAGE CLASSIFICATION (pTNM, AJCC 8th Edition)   Reporting of pT, pN, and (when applicable) pM categories is based on   information available to the pathologist at the time the report is   issued. As per the AJCC (Chapter 1, 8th Ed.) it is the managing   physician's responsibility to establish the final pathologic stage   based upon all pertinent information, including but potentially not   limited to this pathology report.     Pathologic Stage Classification:  Histologic type appropriate for     staging       TNM Descriptors:  y (post-treatment)       pT Category:  pT2       pN Category:  pN not assigned (no nodes submitted or found)   SPECIAL  STUDIES     Immunohistochemistry:  Not performed     Cytogenetics:  Not performed     Molecular Pathology:  Not performed   Comment(s):  There is adequate tumor present in multiple blocks (A4   specifically) for additional studies if clinically indicated. Per OP   note, 3 to 5 cm margins were thought to have been obtained.      Procedures  Operation 4/13/2023:  1.  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  Intraoperative ultrasound  3.  Complex layered closure     Diagnosis:     1. Myxoid liposarcoma (HCC)        2. R gluteal pain and mass/swellinh        3. Abnormal finding of diagnostic imaging        4. Surgery follow-up examination            Medical Decision Making:  Today's Assessment / Status / Plan:   Patient is a very pleasant 42-year-old female who presents for surgical evaluation for a newly identified right gluteal myxoid liposarcoma.  She has completed neoadjuvant radiation therapy and underwent resection on 4/13/2023.    She presents today for her first surveillance visit.  She is been getting physical therapy and doing remarkably well.  She is back to work.  She is got minimal residual weakness.  Her imaging shows some scarring in the area of resection but no definitive tumor.  This will serve as a good baseline for surveillance going forward.  Her CT chest abdomen pelvis did not show any evidence of metastatic disease.  We will plan to have her follow-up in 3 months with a repeat MRI of the right lower extremity.  CT chest abdomen pelvis will occur every 6 months.    She has many good questions all which were answered she is in agreement with the plan moving forward.  She has her contact information and was instructed to call us if there are any issues in the interim.    I, Artie Rojas MD have entered, reviewed and confirmed the above diagnosis related to this patient on this date of service, August 22, 2023 10:20 AM    Artie Rojas MD  August 22, 2023,  10:20 AM

## 2023-08-22 ENCOUNTER — OFFICE VISIT (OUTPATIENT)
Dept: SURGICAL ONCOLOGY | Facility: MEDICAL CENTER | Age: 43
End: 2023-08-22
Payer: COMMERCIAL

## 2023-08-22 VITALS
WEIGHT: 148 LBS | TEMPERATURE: 97.5 F | BODY MASS INDEX: 27.96 KG/M2 | SYSTOLIC BLOOD PRESSURE: 90 MMHG | OXYGEN SATURATION: 98 % | DIASTOLIC BLOOD PRESSURE: 60 MMHG | HEART RATE: 87 BPM

## 2023-08-22 DIAGNOSIS — C49.9 MYXOID LIPOSARCOMA (HCC): ICD-10-CM

## 2023-08-22 DIAGNOSIS — M79.18 GLUTEAL PAIN: ICD-10-CM

## 2023-08-22 DIAGNOSIS — R93.89 ABNORMAL FINDING OF DIAGNOSTIC IMAGING: ICD-10-CM

## 2023-08-22 DIAGNOSIS — Z09 SURGERY FOLLOW-UP EXAMINATION: ICD-10-CM

## 2023-08-22 PROCEDURE — 3074F SYST BP LT 130 MM HG: CPT | Performed by: SURGERY

## 2023-08-22 PROCEDURE — 99215 OFFICE O/P EST HI 40 MIN: CPT | Performed by: SURGERY

## 2023-08-22 PROCEDURE — 3078F DIAST BP <80 MM HG: CPT | Performed by: SURGERY

## 2023-08-22 ASSESSMENT — FIBROSIS 4 INDEX: FIB4 SCORE: 1.17

## 2023-08-22 ASSESSMENT — ENCOUNTER SYMPTOMS
PHOTOPHOBIA: 0
TREMORS: 0
ORTHOPNEA: 0
CHILLS: 0
WEIGHT LOSS: 0
COUGH: 0
MYALGIAS: 0
BLURRED VISION: 0
CONSTIPATION: 0
DIARRHEA: 0
BRUISES/BLEEDS EASILY: 0
FOCAL WEAKNESS: 0
VOMITING: 0
CLAUDICATION: 0
EYE DISCHARGE: 0
DIZZINESS: 0
NECK PAIN: 0
SPUTUM PRODUCTION: 0
DOUBLE VISION: 0
PALPITATIONS: 0
FEVER: 0
HALLUCINATIONS: 0
HEADACHES: 0
HEMOPTYSIS: 0
HEARTBURN: 0
DEPRESSION: 0
TINGLING: 0
NERVOUS/ANXIOUS: 0
ABDOMINAL PAIN: 0
EYE PAIN: 0
SPEECH CHANGE: 0
NAUSEA: 0
SENSORY CHANGE: 0
BACK PAIN: 0

## 2023-08-22 ASSESSMENT — LIFESTYLE VARIABLES: SUBSTANCE_ABUSE: 0

## 2023-11-22 ENCOUNTER — HOSPITAL ENCOUNTER (OUTPATIENT)
Dept: RADIOLOGY | Facility: MEDICAL CENTER | Age: 43
End: 2023-11-22
Attending: SURGERY
Payer: COMMERCIAL

## 2023-11-22 DIAGNOSIS — C49.9 MYXOID LIPOSARCOMA (HCC): ICD-10-CM

## 2023-11-22 PROCEDURE — 700117 HCHG RX CONTRAST REV CODE 255: Performed by: SURGERY

## 2023-11-22 PROCEDURE — A9579 GAD-BASE MR CONTRAST NOS,1ML: HCPCS | Performed by: SURGERY

## 2023-11-22 PROCEDURE — 73720 MRI LWR EXTREMITY W/O&W/DYE: CPT | Mod: RT

## 2023-11-22 RX ADMIN — GADOTERIDOL 13 ML: 279.3 INJECTION, SOLUTION INTRAVENOUS at 12:07

## 2023-12-19 ENCOUNTER — TELEPHONE (OUTPATIENT)
Dept: SURGICAL ONCOLOGY | Facility: MEDICAL CENTER | Age: 43
End: 2023-12-19

## 2023-12-20 ENCOUNTER — TELEPHONE (OUTPATIENT)
Dept: SURGICAL ONCOLOGY | Facility: MEDICAL CENTER | Age: 43
End: 2023-12-20

## 2023-12-20 NOTE — PROGRESS NOTES
Subjective:   1/2/2024  8:01 AM  Primary care physician: Pcp Pt States None  Referring Provider/Medical Oncologist: CHAZ Gregory   Radiation Oncology: Charles Tian MD     Chief Complaint:   Chief Complaint   Patient presents with    Follow-Up     GLUTEAL MYXOID SARCOMA  MRI 11/2023        Diagnosis:   1. 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 R gluteal pain and mass/swellinh        3. Abnormal finding of diagnostic imaging        4. Surgery follow-up examination            History of presenting illness:  Madison Allen is a pleasant 43 y.o. female otherwise healthy who presents for surgical evaluation for newly identified right posterior thigh myxoid liposarcoma.  The patient noted a small mass in her right gluteal region in June 2020.  Over the last few months it has started to increase significantly in size.  She not been symptomatic from it previously but it has started to cause her some discomfort with sitting down, driving, going to the bathroom.  She denies any pain, weakness, decreased sensation in the right lower extremity.  She was initially evaluated in our medical oncology clinic and a biopsy was performed.  The biopsy was consistent with a myxoid liposarcoma with no round cell component identified in the limited sample.     Patient presents today for surgical evaluation of newly identified myxoid liposarcoma of the right gluteus muscle extending down to the right posterior thigh.  She states that she does feel as if the mass is grown since her biopsy was performed.  She does has discomfort directly over the area when she sits down, drives, goes to the bathroom.  She denies any neuromuscular symptoms of the right lower extremity and appears to have full function of the extremity.  She denies any fevers or chills.  No recent weight loss.  Denies any masses in any other places.     Update 2/1/2023:   Patient presents today for  follow-up post neoadjuvant radiation therapy.  She did well with therapy did not experience any significant complications and notes that she feels that the mass has shrunk significantly since I originally saw her.  She completed radiation on 1/19/2023 with a total of 50 Gray in 25 fractions.  She has done remarkably well.  She denies any pain over the area of the mass.  She is scheduled for a CT of the chest abdomen and pelvis tomorrow.     Update 3/28/2023:  Patient presents today for preoperative discussion.  She was seen by plastic surgery and I had a discussion with Dr. Santizo in regards to the possible need for reconstruction for closure of the wound. Given the shrinkage of the tumor does feel that we might be able to get this wound closed.  She in general is doing well since I last saw her.  She does not feel any significant growth of the mass of the lesion and is otherwise recovered well from her radiation.     Update 4/25/2023:  Operation 4/13/2023:  1.  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  Intraoperative ultrasound  3.  Complex layered closure     Patient presents today for her first postoperative visit.  She has generally been doing well at home. Her drain is putting out approximately 40 to 50 cc a day of clear serous fluid.  She continues to be weak with ambulation but this is slowly improving and she is needing to use her cane less.  She is having a little bit of pain but it is well controlled with minimal need for pain medication.  She is otherwise tolerating a diet and denies any drainage or purulent material from her dressing.     Update 5/10/23  Tumor board discussion in regards to her final pathology and margins.  The ultimate decision of the tumor board was to initiate ongoing surveillance with no need for additional adjuvant therapy at this time.     Drain is putting out approximately 15 cc/day     She is here today for a postoperative follow-up visit.  She has  been doing well.  She states her strength is continuing to improve and she is walking better and better each day.  Her drain output has been minimal and serous appearing.  She had no issues with her wound. The plan is for genetic testing and no plan for adjuvant radiation at this time. She has followed up with [facility], and genetic was not recommended at the time. We will plan for to start surveillance with every 3 months MRI of the right lower extremity and 6 month CTs of the chest, abdomen and pelvis.     Update 6/6/23  She is here today for follow-up.  She is in general been doing very well since her surgery.  She is no longer walking with a cane.  She still does have some issues with bending over she feels weak when she does that but otherwise is nearly back to her normal state of health with the exception of some ongoing weakness and difficulty with bending.  She does still limp at baseline but this seems to be slowly improving as her strength improves. She denies any issues with the wound.  She does have some numbness on the posterior aspect of her right thigh.     Update 7/5/23     She is here today for postoperative visit.  She has been doing incredibly well since surgery.  States she has essentially no deficit at this point.  She is walking normal.  No use of any assistance device.  She feels like she is back to her normal state of health and functionally has no deficits on the right side.  She does have some pain over the medial aspect of the surgical site which has been stable.  She intermittently will take Tylenol or ibuprofen but does not take anything around-the-clock.  She otherwise is doing well.  She would like to go back to work 2 weeks.     Update 1/2/23  MRI FEMUR on 11/22/23 noted surgical change consistent with prior resection of right upper posterior thigh/inferior buttock mass. There is again seen enhancing scar tissue within the subcutaneous fat with extension into the right gluteus  denise muscle inferiorly,   quadratus femoris muscle and the upper hamstring seen right vastus lateralis and vastus intermedius muscles posteriorly.    She presents today for surveillance visit.  In general she has been doing very well.  She still has some numbness along the back side of her upper leg along the hamstring muscle on the right.  She has no limitations with respect to function of the leg.  She has been back at work and doing well.  She otherwise has normal function and is back to all of her activities of daily living without issue.    Past Medical History:   Diagnosis Date    Pregnant state, incidental      Past Surgical History:   Procedure Laterality Date    [place] ULTRASONIC GUIDANCE, INTRAOPERATIVE  4/13/2023    Procedure: ULTRASOUND GUIDANCE;  Surgeon: Artie Rojas M.D.;  Location: SURGERY [facility];  Service: General    MASS EXCISION GENERAL  4/13/2023    Procedure: RADICAL RESECTION OF RIGHT GLUTEAL MASS;  Surgeon: Artie Rojas M.D.;  Location: SURGERY [facility];  Service: General    CHOLECYSTECTOMY  Dx 2006    no complications     Allergies   Allergen Reactions    Nkda [No Known Drug Allergy]      Outpatient Encounter Medications as of 1/2/2024   Medication Sig Dispense Refill    etonogestrel (NEXPLANON) 68 MG Implant implant Inject 1 Each under the skin one time.      docusate sodium 100 MG Cap Take 100 mg by mouth 2 times a day. 60 Capsule 1    [DISCONTINUED] acetaminophen (TYLENOL) 325 MG Tab Take 650 mg by mouth every four hours as needed.       No facility-administered encounter medications on file as of 1/2/2024.     Social History     Socioeconomic History    Marital status: Single     Spouse name: Not on file    Number of children: Not on file    Years of education: Not on file    Highest education level: Not on file   Occupational History    Not on file   Tobacco Use    Smoking status: Never    Smokeless tobacco: Never    Tobacco comments:     last used 9 years ago    Vaping Use    Vaping Use: Never used   Substance and Sexual Activity    Alcohol use: No     Comment: socially    Drug use: No    Sexual activity: Yes     Partners: Male     Birth control/protection: Condom   Other Topics Concern    Not on file   Social History Narrative    Not on file     Social Determinants of Health     Financial Resource Strain: Not on file   Food Insecurity: Not on file   Transportation Needs: Not on file   Physical Activity: Not on file   Stress: Not on file   Social Connections: Not on file   Intimate Partner Violence: Not on file   Housing Stability: Not on file      Social History     Tobacco Use   Smoking Status Never   Smokeless Tobacco Never   Tobacco Comments    last used 9 years ago     Social History     Substance and Sexual Activity   Alcohol Use No    Comment: socially     Social History     Substance and Sexual Activity   Drug Use No      Family History   Problem Relation Age of Onset    Heart Disease Mother 62        hearth attack    Diabetes Mother         pills    Hypertension Mother     Cancer Father         Lung    Alcohol/Drug Father         alcoholic    Thyroid Sister     Cancer Brother         Lung    Diabetes Brother         diet         Review of Systems   Constitutional:  Negative for chills, fever, malaise/fatigue and weight loss.   HENT:  Negative for congestion, ear discharge, ear pain, hearing loss and nosebleeds.    Eyes:  Negative for blurred vision, double vision, photophobia, pain and discharge.   Respiratory:  Negative for cough, hemoptysis and sputum production.    Cardiovascular:  Negative for chest pain, palpitations, orthopnea and claudication.   Gastrointestinal:  Negative for abdominal pain, constipation, diarrhea, heartburn, nausea and vomiting.   Genitourinary:  Negative for dysuria, frequency, hematuria and urgency.   Musculoskeletal:  Negative for back pain, joint pain, myalgias and neck pain.   Skin:  Negative for itching and rash.   Neurological:   "Negative for dizziness, tingling, tremors, sensory change, speech change, focal weakness and headaches.   Endo/Heme/Allergies:  Negative for environmental allergies. Does not bruise/bleed easily.   Psychiatric/Behavioral:  Negative for depression, hallucinations, substance abuse and suicidal ideas. The patient is not nervous/anxious.         Objective:   BP 94/60 (BP Location: Right arm, Patient Position: Sitting)   Pulse 95   Temp 36.3 °C (97.3 °F) (Temporal)   Ht 1.6 m (5' 3\")   Wt 63.5 kg (140 lb)   SpO2 99%   BMI 24.80 kg/m²     Physical Exam  Constitutional:       General: She is not in acute distress.  HENT:      Head: Normocephalic and atraumatic.   Eyes:      General: No scleral icterus.  Cardiovascular:      Rate and Rhythm: Normal rate and regular rhythm.   Pulmonary:      Effort: No respiratory distress.   Abdominal:      Palpations: Abdomen is soft.   Musculoskeletal:      Cervical back: Normal range of motion.   Skin:     Comments: Well-healed surgical incision over the posterior aspect of the right gluteus muscle.  No signs of recurrence within the incision or surgical site.   Neurological:      Mental Status: She is alert.      Comments: Decreased sensation over the posterior aspect of the right lower extremity         Labs   Latest Reference Range & Units Most Recent   WBC 4.8 - 10.8 K/uL 4.2 (L)  4/16/23 03:39   RBC 4.20 - 5.40 M/uL 4.43  4/16/23 03:39   Hemoglobin 12.0 - 16.0 g/dL 11.4 (L)  4/16/23 03:39   Hematocrit 37.0 - 47.0 % 35.5 (L)  4/16/23 03:39   MCV 81.4 - 97.8 fL 80.1 (L)  4/16/23 03:39   MCH 27.0 - 33.0 pg 25.7 (L)  4/16/23 03:39   MCHC 33.6 - 35.0 g/dL 32.1 (L)  4/16/23 03:39   RDW 35.9 - 50.0 fL 43.4  4/16/23 03:39   Platelet Count 164 - 446 K/uL 157 (L)  4/16/23 03:39   (L): Data is abnormally low      Latest Reference Range & Units Most Recent   Sodium 135 - 145 mmol/L 140  4/16/23 03:39   Potassium 3.6 - 5.5 mmol/L 3.8  4/16/23 03:39   Chloride 96 - 112 mmol/L 107  4/16/23 " 03:39   Co2 20 - 33 mmol/L 23  4/16/23 03:39   Anion Gap 7.0 - 16.0  10.0  4/16/23 03:39   Glucose 65 - 99 mg/dL 106 (H)  4/16/23 03:39   Bun 8 - 22 mg/dL 10  4/16/23 03:39   Creatinine 0.50 - 1.40 mg/dL 0.51  4/16/23 03:39   GFR (CKD-EPI) >60 mL/min/1.73 m 2 118  4/16/23 03:39   GFR If African American mL/min/1.73 m 2 >60  12/22/11 16:10   GFR If Non African American mL/min/1.73 m 2 >60  12/22/11 16:10   Calcium 8.5 - 10.5 mg/dL 9.1  4/16/23 03:39   Correct Calcium 8.5 - 10.5 mg/dL 9.3  4/16/23 03:39   AST(SGOT) 12 - 45 U/L 34  4/16/23 03:39   ALT(SGPT) 2 - 50 U/L 66 (H)  4/16/23 03:39   Alkaline Phosphatase 30 - 99 U/L 122 (H)  4/16/23 03:39   Total Bilirubin 0.1 - 1.5 mg/dL 0.3  4/16/23 03:39   Albumin 3.2 - 4.9 g/dL 3.7  4/16/23 03:39   Total Protein 6.0 - 8.2 g/dL 7.0  4/16/23 03:39   Globulin 1.9 - 3.5 g/dL 3.3  4/16/23 03:39   A-G Ratio g/dL 1.1  4/16/23 03:39   (H): Data is abnormally high    Imaging  MR FEMUR (11/22/23)  IMPRESSION:   1.  Again seen surgical change consistent with prior resection of right upper posterior thigh/inferior buttock mass. There is again seen enhancing scar tissue within the subcutaneous fat with extension into the right gluteus denise muscle inferiorly,   quadratus femoris muscle and the upper hamstring seen right vastus lateralis and vastus intermedius muscles posteriorly.  2.  Interval resolution of previously seen postsurgical fluid collection at the surgical site.    CT CHEST/ABDOMEN/PELVIS (8/10/23)  IMPRESSION:  1.  No definitive evidence of metastatic disease in the chest, abdomen or pelvis.  2.  Ill-defined soft tissue at the right gluteal mass resection site measures 4.1 cm, possibly postoperative scarring. Attention on follow-up.  3.  Interval enlargement of triangular anterior mediastinal soft tissue, likely representing thymic rebound rather than mediastinal metastasis. Attention on follow-up.    MR FEMUR (8/10/23)  IMPRESSION:  1.  Interval resection of a right  "upper posterior thigh/inferior buttock mass. There is enhancement along the surgical course which also involves the gluteus denise muscle inferiorly, quadratus femoris muscle, upper hamstrings and right upper posterior vastus lateralis and vastus intermedius muscles. This likely represents postsurgical scar. There is also a rim-enhancing fluid collection at the surgical site measuring 2.8 x 1.8 x 1.5 cm in size likely representing postsurgical hematoma or seroma. This study will serve as a baseline for future interval follow-up.   2.  No evidence of underlying bony abnormality.     CT pelvis with contrast ([facility]) 11/1/2022:  Fairly well-circumscribed ovoid mass in the right gluteus denise muscle measuring 15 x 9 x 6 cm.  Characterized by areas of mostly fluid density but some fatty components are seen as well.  No extension beyond the confines of the muscle is seen.  Some fat is seen peripherally to the margins.  No lymphadenopathy.     MRI femur with and without contrast right ([facility]) 11/2/2022:   Large soft tissue mass involving the right buttock inferiorly which extends into the right upper posterior thigh.  Appears to involve the gluteus medius denise muscle as well as contact the hamstrings muscle.  Measures 16 x 10 x 7.3 cm in size and characterized by heterogeneous increased T2 signal with mixed intermediate and increased T1 signal with heterogeneous enhancement.  No evidence of adenopathy.    CT chest abdomen pelvis without contrast ([facility]) 11/2/2022: No contrast evidence of metastasis.  Right gluteus denise centered mass is unchanged.  \"Prior cholecystectomy.  Calcification the right lobe of the liver is indeterminate but may represent remote granulomatous disease.     CT chest abdomen pelvis with contrast 2/2/2023:  1.  Negative for metastatic disease within the chest, abdomen, or pelvis  2.  2.6 cm right adnexal cyst is likely physiologic in this age group  3.  Previous cholecystectomy  4.  Right " gluteal intramuscular mass is considerably smaller now measuring 5.7 x 2.6 cm and previously 9.3 x 6.0 cm consistent with favorable response to therapy     MRI right femur with and without contrast 2/17/2023:  Interval decrease in size of a large right inferior buttock/upper thigh mass which involves the right gluteus denise muscle and contacts the hamstrings muscles. This currently measures 10 x 5.6 x 3.6 cm in size compared to previous measurement of 16 x   10 x 7.3 cm. This is again most consistent with myxoid variant liposarcoma.     Pathology  IR guided biopsy ([facility]) 11/12/2022:   Right posterior thigh mass on core biopsy consistent with myxoid liposarcoma.  There is no round cell component identified in the limited sample.     Surgical pathology from 4/13/2023:  A. Sarcoma:          Myxoid liposarcoma.     SOFT TISSUE: Resection   CLINICAL     Preresection Treatment:  Radiation therapy performed   SPECIMEN     Procedure:  Wide resection (per OP note)   TUMOR     Tumor Focality:  Unifocal     Tumor Site:  Trunk and extremities - right gluteus denise muscle     Tumor Size:  Greatest Dimension (Centimeters) - 7.2 cm     Histologic Type (WHO):  Myxoid liposarcoma       Percentage of Hypercellular Areas - 0%     Histologic Grade (FNCLCC):  Grade 1     Mitotic Rate:  0 mitoses per 10 high-power fields (HPF)     Necrosis (macroscopic or microscopic):  Not identified     Treatment Effect:  Not identified     Lymphovascular Invasion:  Not identified     Tumor Comment:  Per OP note, the tumor did shrink to half its size     after therapy but no histologic evidence is noted on slides.   MARGINS     Margin Status:  Tumor present at margin       Margin(s) Involved by Tumor:  Posterior margin is positive     Margin Comment:  Lateral margin is uninvolved by < 1/10 mm in     distance and medial margin is uninvolved by < 1/2 mm and anterior     margin is 3 mm in distance.  All other margins are at least 5 mm in      distance.   REGIONAL LYMPH NODES     Regional Lymph Node Status:  Not applicable (no regional lymph nodes     submitted or found)   PATHOLOGIC STAGE CLASSIFICATION (pTNM, AJCC 8th Edition)   Reporting of pT, pN, and (when applicable) pM categories is based on   information available to the pathologist at the time the report is   issued. As per the AJCC (Chapter 1, 8th Ed.) it is the managing   physician's responsibility to establish the final pathologic stage   based upon all pertinent information, including but potentially not   limited to this pathology report.     Pathologic Stage Classification:  Histologic type appropriate for     staging       TNM Descriptors:  y (post-treatment)       pT Category:  pT2       pN Category:  pN not assigned (no nodes submitted or found)   SPECIAL STUDIES     Immunohistochemistry:  Not performed     Cytogenetics:  Not performed     Molecular Pathology:  Not performed   Comment(s):  There is adequate tumor present in multiple blocks (A4   specifically) for additional studies if clinically indicated. Per OP   note, 3 to 5 cm margins were thought to have been obtained.      Procedures  Operation 4/13/2023:  1.  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  Intraoperative ultrasound  3.  Complex layered closure      Diagnosis:     1. 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 R gluteal pain and mass/swellinh        3. Abnormal finding of diagnostic imaging        4. Surgery follow-up examination            Medical Decision Making:  Today's Assessment / Status / Plan:     Patient is a very pleasant 42-year-old female who presents for surgical evaluation for a newly identified right gluteal myxoid liposarcoma.  She has completed neoadjuvant radiation therapy and underwent resection on 4/13/2023.     She presents today for surveillance visit.  We did review her imaging.  There is scarring in the surgical field  but no clear evidence of recurrence.  She otherwise is doing well.  I did explain to her that she can put moisturizing cream over the area of radiation where she has some skin changes.  She otherwise will continue with normal life and we will plan to repeat her imaging in a couple months as part of her surveillance protocol.      The patient asked several great questions which were answered to their satisfaction.  They  are in agreement with the care plan as outlined above.    I, Artie Rojas MD have entered, reviewed and confirmed the above diagnosis related to this patient on this date of service, January 2, 2024 10:27 AM    Artie Rojas MD  Surgical Oncology

## 2023-12-20 NOTE — TELEPHONE ENCOUNTER
[facility] [employer] contacted and pt is still currently insured and able to come into the office for an apptmt without a consult. [facility] [employer] also informed pt needs to follow up

## 2024-01-02 ENCOUNTER — OFFICE VISIT (OUTPATIENT)
Dept: SURGICAL ONCOLOGY | Facility: MEDICAL CENTER | Age: 44
End: 2024-01-02
Payer: COMMERCIAL

## 2024-01-02 VITALS
DIASTOLIC BLOOD PRESSURE: 60 MMHG | BODY MASS INDEX: 24.8 KG/M2 | HEIGHT: 63 IN | HEART RATE: 95 BPM | OXYGEN SATURATION: 99 % | SYSTOLIC BLOOD PRESSURE: 94 MMHG | WEIGHT: 140 LBS | TEMPERATURE: 97.3 F

## 2024-01-02 DIAGNOSIS — M79.18 GLUTEAL PAIN: ICD-10-CM

## 2024-01-02 DIAGNOSIS — C49.9 MYXOID LIPOSARCOMA (HCC): ICD-10-CM

## 2024-01-02 DIAGNOSIS — R93.89 ABNORMAL FINDING OF DIAGNOSTIC IMAGING: ICD-10-CM

## 2024-01-02 DIAGNOSIS — Z09 SURGERY FOLLOW-UP EXAMINATION: ICD-10-CM

## 2024-01-02 PROCEDURE — 3078F DIAST BP <80 MM HG: CPT | Performed by: SURGERY

## 2024-01-02 PROCEDURE — 99214 OFFICE O/P EST MOD 30 MIN: CPT | Performed by: SURGERY

## 2024-01-02 PROCEDURE — 3074F SYST BP LT 130 MM HG: CPT | Performed by: SURGERY

## 2024-01-02 ASSESSMENT — ENCOUNTER SYMPTOMS
DEPRESSION: 0
CHILLS: 0
ABDOMINAL PAIN: 0
NERVOUS/ANXIOUS: 0
EYE PAIN: 0
TINGLING: 0
CONSTIPATION: 0
HEMOPTYSIS: 0
MYALGIAS: 0
BRUISES/BLEEDS EASILY: 0
DIARRHEA: 0
HALLUCINATIONS: 0
TREMORS: 0
SPEECH CHANGE: 0
NAUSEA: 0
PHOTOPHOBIA: 0
FOCAL WEAKNESS: 0
VOMITING: 0
FEVER: 0
PALPITATIONS: 0
CLAUDICATION: 0
WEIGHT LOSS: 0
HEADACHES: 0
HEARTBURN: 0
DOUBLE VISION: 0
NECK PAIN: 0
BLURRED VISION: 0
COUGH: 0
DIZZINESS: 0
SENSORY CHANGE: 0
SPUTUM PRODUCTION: 0
BACK PAIN: 0
ORTHOPNEA: 0
EYE DISCHARGE: 0

## 2024-01-02 ASSESSMENT — FIBROSIS 4 INDEX: FIB4 SCORE: 1.17

## 2024-01-02 ASSESSMENT — LIFESTYLE VARIABLES: SUBSTANCE_ABUSE: 0

## 2024-04-02 ENCOUNTER — APPOINTMENT (OUTPATIENT)
Dept: RADIOLOGY | Facility: MEDICAL CENTER | Age: 44
End: 2024-04-02
Attending: SURGERY
Payer: COMMERCIAL

## 2024-04-02 DIAGNOSIS — C49.9 MYXOID LIPOSARCOMA (HCC): ICD-10-CM

## 2024-04-02 PROCEDURE — 73720 MRI LWR EXTREMITY W/O&W/DYE: CPT | Mod: RT

## 2024-04-02 PROCEDURE — 700117 HCHG RX CONTRAST REV CODE 255: Performed by: SURGERY

## 2024-04-02 PROCEDURE — 71260 CT THORAX DX C+: CPT

## 2024-04-02 PROCEDURE — A9579 GAD-BASE MR CONTRAST NOS,1ML: HCPCS | Performed by: SURGERY

## 2024-04-02 RX ADMIN — IOHEXOL 100 ML: 350 INJECTION, SOLUTION INTRAVENOUS at 10:49

## 2024-04-02 RX ADMIN — GADOTERIDOL 15 ML: 279.3 INJECTION, SOLUTION INTRAVENOUS at 10:14

## 2024-04-16 NOTE — PROGRESS NOTES
Subjective:   4/17/2024 11:40 AM  Primary care physician: Pcp Pt States None  Referring Provider/Medical Oncologist: CHAZ Gregory   Radiation Oncology: Charles Tian MD     Chief Complaint:   Chief Complaint   Patient presents with    Follow-Up     GLUTEAL MYXOID SARCOMA  RESECT 4/2023  MR 4/2 STABLE  CT 4/2 STABLE        Diagnosis:   1. Myxoid liposarcoma (HCC)        2. R gluteal pain and mass/swellinh        3. Abnormal finding of diagnostic imaging          History of presenting illness:    Madison Allen is a pleasant 43 y.o. female otherwise healthy who presents for surgical evaluation for newly identified right posterior thigh myxoid liposarcoma.  The patient noted a small mass in her right gluteal region in June 2020.  Over the last few months it has started to increase significantly in size.  She not been symptomatic from it previously but it has started to cause her some discomfort with sitting down, driving, going to the bathroom.  She denies any pain, weakness, decreased sensation in the right lower extremity.  She was initially evaluated in our medical oncology clinic and a biopsy was performed.  The biopsy was consistent with a myxoid liposarcoma with no round cell component identified in the limited sample.     Patient presents today for surgical evaluation of newly identified myxoid liposarcoma of the right gluteus muscle extending down to the right posterior thigh.  She states that she does feel as if the mass is grown since her biopsy was performed.  She does has discomfort directly over the area when she sits down, drives, goes to the bathroom.  She denies any neuromuscular symptoms of the right lower extremity and appears to have full function of the extremity.  She denies any fevers or chills.  No recent weight loss.  Denies any masses in any other places.     Update 2/1/2023:   Patient presents today for follow-up post  neoadjuvant radiation therapy.  She did well with therapy did not experience any significant complications and notes that she feels that the mass has shrunk significantly since I originally saw her.  She completed radiation on 1/19/2023 with a total of 50 Gray in 25 fractions.  She has done remarkably well.  She denies any pain over the area of the mass.  She is scheduled for a CT of the chest abdomen and pelvis tomorrow.     Update 3/28/2023:  Patient presents today for preoperative discussion.  She was seen by plastic surgery and I had a discussion with Dr. Santizo in regards to the possible need for reconstruction for closure of the wound. Given the shrinkage of the tumor does feel that we might be able to get this wound closed.  She in general is doing well since I last saw her.  She does not feel any significant growth of the mass of the lesion and is otherwise recovered well from her radiation.     Update 4/25/2023:  Operation 4/13/2023:  1.  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  Intraoperative ultrasound  3.  Complex layered closure     Patient presents today for her first postoperative visit.  She has generally been doing well at home. Her drain is putting out approximately 40 to 50 cc a day of clear serous fluid.  She continues to be weak with ambulation but this is slowly improving and she is needing to use her cane less.  She is having a little bit of pain but it is well controlled with minimal need for pain medication.  She is otherwise tolerating a diet and denies any drainage or purulent material from her dressing.     Update 5/10/23  Tumor board discussion in regards to her final pathology and margins.  The ultimate decision of the tumor board was to initiate ongoing surveillance with no need for additional adjuvant therapy at this time.     Drain is putting out approximately 15 cc/day     She is here today for a postoperative follow-up visit.  She has been doing well.   She states her strength is continuing to improve and she is walking better and better each day.  Her drain output has been minimal and serous appearing.  She had no issues with her wound. The plan is for genetic testing and no plan for adjuvant radiation at this time. She has followed up with [employer], and genetic was not recommended at the time. We will plan for to start surveillance with every 3 months MRI of the right lower extremity and 6 month CTs of the chest, abdomen and pelvis.     Update 6/6/23  She is here today for follow-up.  She is in general been doing very well since her surgery.  She is no longer walking with a cane.  She still does have some issues with bending over she feels weak when she does that but otherwise is nearly back to her normal state of health with the exception of some ongoing weakness and difficulty with bending.  She does still limp at baseline but this seems to be slowly improving as her strength improves. She denies any issues with the wound.  She does have some numbness on the posterior aspect of her right thigh.     Update 7/5/23  She is here today for postoperative visit.  She has been doing incredibly well since surgery.  States she has essentially no deficit at this point.  She is walking normal.  No use of any assistance device.  She feels like she is back to her normal state of health and functionally has no deficits on the right side.  She does have some pain over the medial aspect of the surgical site which has been stable.  She intermittently will take Tylenol or ibuprofen but does not take anything around-the-clock.  She otherwise is doing well.  She would like to go back to work 2 weeks.     Update 1/2/23  MRI FEMUR on 11/22/23 noted surgical change consistent with prior resection of right upper posterior thigh/inferior buttock mass. There is again seen enhancing scar tissue within the subcutaneous fat with extension into the right gluteus denise muscle  inferiorly, quadratus femoris muscle and the upper hamstring seen right vastus lateralis and vastus intermedius muscles posteriorly.     She presents today for surveillance visit.  In general she has been doing very well.  She still has some numbness along the back side of her upper leg along the hamstring muscle on the right.  She has no limitations with respect to function of the leg.  She has been back at work and doing well.  She otherwise has normal function and is back to all of her activities of daily living without issue.    Update 4/17/24  MR FEMUR on 4/2/24 noted postoperative change with mild improvement in signal abnormality. No definite evidence for tumor recurrence.    CT C/A/P on 4/2/24 noted no evidence of disease recurrence. Stable 4 cm soft tissue fullness of the resection site, therefore favoring scarring. Slightly decreased thymic tissue. Therefore, considered benign. Otherwise, negative.    She is here today for generally doing well.  No issues.  Still feels right a little weaker than left but does not affect her function.  No pain.  Discussed imaging results from most recent surveillance imaging.    Past Medical History:   Diagnosis Date    Pregnant state, incidental      Past Surgical History:   Procedure Laterality Date    UT ULTRASONIC GUIDANCE, INTRAOPERATIVE  4/13/2023    Procedure: ULTRASOUND GUIDANCE;  Surgeon: Artie Rojas M.D.;  Location: SURGERY [facility];  Service: General    MASS EXCISION GENERAL  4/13/2023    Procedure: RADICAL RESECTION OF RIGHT GLUTEAL MASS;  Surgeon: Artie Rojas M.D.;  Location: SURGERY [facility];  Service: General    CHOLECYSTECTOMY  Dx 2006    no complications     Allergies   Allergen Reactions    Nkda [No Known Drug Allergy]      Outpatient Encounter Medications as of 4/17/2024   Medication Sig Dispense Refill    docusate sodium 100 MG Cap Take 100 mg by mouth 2 times a day. 60 Capsule 1    etonogestrel (NEXPLANON) 68 MG Implant implant  Inject 1 Each under the skin one time.       No facility-administered encounter medications on file as of 4/17/2024.     Social History     Socioeconomic History    Marital status: Single     Spouse name: Not on file    Number of children: Not on file    Years of education: Not on file    Highest education level: Not on file   Occupational History    Not on file   Tobacco Use    Smoking status: Never    Smokeless tobacco: Never    Tobacco comments:     last used 9 years ago   Vaping Use    Vaping Use: Never used   Substance and Sexual Activity    Alcohol use: No     Comment: socially    Drug use: No    Sexual activity: Yes     Partners: Male     Birth control/protection: Condom   Other Topics Concern    Not on file   Social History Narrative    Not on file     Social Determinants of Health     Financial Resource Strain: Not on file   Food Insecurity: Not on file   Transportation Needs: Not on file   Physical Activity: Not on file   Stress: Not on file   Social Connections: Not on file   Intimate Partner Violence: Not on file   Housing Stability: Not on file      Social History     Tobacco Use   Smoking Status Never   Smokeless Tobacco Never   Tobacco Comments    last used 9 years ago     Social History     Substance and Sexual Activity   Alcohol Use No    Comment: socially     Social History     Substance and Sexual Activity   Drug Use No      Family History   Problem Relation Age of Onset    Heart Disease Mother 62        hearth attack    Diabetes Mother         pills    Hypertension Mother     Cancer Father         Lung    Alcohol/Drug Father         alcoholic    Thyroid Sister     Cancer Brother         Lung    Diabetes Brother         diet       Review of Systems   Constitutional:  Negative for chills, fever, malaise/fatigue and weight loss.   HENT:  Negative for congestion, ear discharge, ear pain, hearing loss and nosebleeds.    Eyes:  Negative for blurred vision, double vision, photophobia, pain and  "discharge.   Respiratory:  Negative for cough, hemoptysis and sputum production.    Cardiovascular:  Negative for chest pain, palpitations, orthopnea and claudication.   Gastrointestinal:  Negative for abdominal pain, constipation, diarrhea, heartburn, nausea and vomiting.   Genitourinary:  Negative for dysuria, frequency, hematuria and urgency.   Musculoskeletal:  Negative for back pain, joint pain, myalgias and neck pain.   Skin:  Negative for itching and rash.   Neurological:  Negative for dizziness, tingling, tremors, sensory change, speech change, focal weakness and headaches.   Endo/Heme/Allergies:  Negative for environmental allergies. Does not bruise/bleed easily.   Psychiatric/Behavioral:  Negative for depression, hallucinations, substance abuse and suicidal ideas. The patient is not nervous/anxious.         Objective:   /64 (BP Location: Left arm, Patient Position: Sitting)   Pulse 75   Temp 36.3 °C (97.3 °F) (Temporal)   Ht 1.549 m (5' 1\")   Wt 63.5 kg (140 lb)   SpO2 98%   BMI 26.45 kg/m²     Physical Exam  Constitutional:       General: She is not in acute distress.  HENT:      Head: Normocephalic and atraumatic.   Eyes:      General: No scleral icterus.  Cardiovascular:      Rate and Rhythm: Normal rate and regular rhythm.   Pulmonary:      Effort: No respiratory distress.   Abdominal:      Palpations: Abdomen is soft.   Musculoskeletal:      Cervical back: Normal range of motion.   Skin:     Comments: Right gluteal incision well healed, no evidence of recurrence on exam.   Neurological:      Mental Status: She is alert.         Labs   Latest Reference Range & Units 04/16/23 03:39   WBC 4.8 - 10.8 K/uL 4.2 (L)   RBC 4.20 - 5.40 M/uL 4.43   Hemoglobin 12.0 - 16.0 g/dL 11.4 (L)   Hematocrit 37.0 - 47.0 % 35.5 (L)   MCV 81.4 - 97.8 fL 80.1 (L)   MCH 27.0 - 33.0 pg 25.7 (L)   MCHC 33.6 - 35.0 g/dL 32.1 (L)   RDW 35.9 - 50.0 fL 43.4   Platelet Count 164 - 446 K/uL 157 (L)   MPV 9.0 - 12.9 fL " 10.5   (L): Data is abnormally low      Latest Reference Range & Units 04/16/23 03:39   Sodium 135 - 145 mmol/L 140   Potassium 3.6 - 5.5 mmol/L 3.8   Chloride 96 - 112 mmol/L 107   Co2 20 - 33 mmol/L 23   Anion Gap 7.0 - 16.0  10.0   Glucose 65 - 99 mg/dL 106 (H)   Bun 8 - 22 mg/dL 10   Creatinine 0.50 - 1.40 mg/dL 0.51   GFR (CKD-EPI) >60 mL/min/1.73 m 2 118   Calcium 8.5 - 10.5 mg/dL 9.1   Correct Calcium 8.5 - 10.5 mg/dL 9.3   AST(SGOT) 12 - 45 U/L 34   ALT(SGPT) 2 - 50 U/L 66 (H)   Alkaline Phosphatase 30 - 99 U/L 122 (H)   Total Bilirubin 0.1 - 1.5 mg/dL 0.3   Albumin 3.2 - 4.9 g/dL 3.7   Total Protein 6.0 - 8.2 g/dL 7.0   Globulin 1.9 - 3.5 g/dL 3.3   A-G Ratio g/dL 1.1   (H): Data is abnormally high     Imaging  MR FEMUR (4/2/24)  IMPRESSION:  1. Postoperative change with mild improvement in signal abnormality.  2. No definite evidence for tumor recurrence.    CT C/A/P (4/2/24)  IMPRESSION:  1. No evidence of disease recurrence. Stable 4 cm soft tissue fullness of the resection site, therefore favoring scarring.  2. Slightly decreased thymic tissue. Therefore, considered benign.  3. Otherwise, negative.    MR FEMUR (11/22/23)  IMPRESSION:   1.  Again seen surgical change consistent with prior resection of right upper posterior thigh/inferior buttock mass. There is again seen enhancing scar tissue within the subcutaneous fat with extension into the right gluteus denise muscle inferiorly,   quadratus femoris muscle and the upper hamstring seen right vastus lateralis and vastus intermedius muscles posteriorly.  2.  Interval resolution of previously seen postsurgical fluid collection at the surgical site.     CT CHEST/ABDOMEN/PELVIS (8/10/23)  IMPRESSION:  1.  No definitive evidence of metastatic disease in the chest, abdomen or pelvis.  2.  Ill-defined soft tissue at the right gluteal mass resection site measures 4.1 cm, possibly postoperative scarring. Attention on follow-up.  3.  Interval enlargement of  "triangular anterior mediastinal soft tissue, likely representing thymic rebound rather than mediastinal metastasis. Attention on follow-up.     MR FEMUR (8/10/23)  IMPRESSION:  1.  Interval resection of a right upper posterior thigh/inferior buttock mass. There is enhancement along the surgical course which also involves the gluteus denise muscle inferiorly, quadratus femoris muscle, upper hamstrings and right upper posterior vastus lateralis and vastus intermedius muscles. This likely represents postsurgical scar. There is also a rim-enhancing fluid collection at the surgical site measuring 2.8 x 1.8 x 1.5 cm in size likely representing postsurgical hematoma or seroma. This study will serve as a baseline for future interval follow-up.   2.  No evidence of underlying bony abnormality.     CT pelvis with contrast ([facility]) 11/1/2022:  Fairly well-circumscribed ovoid mass in the right gluteus denise muscle measuring 15 x 9 x 6 cm.  Characterized by areas of mostly fluid density but some fatty components are seen as well.  No extension beyond the confines of the muscle is seen.  Some fat is seen peripherally to the margins.  No lymphadenopathy.     MRI femur with and without contrast right ([facility]) 11/2/2022:   Large soft tissue mass involving the right buttock inferiorly which extends into the right upper posterior thigh.  Appears to involve the gluteus medius denise muscle as well as contact the hamstrings muscle.  Measures 16 x 10 x 7.3 cm in size and characterized by heterogeneous increased T2 signal with mixed intermediate and increased T1 signal with heterogeneous enhancement.  No evidence of adenopathy.    CT chest abdomen pelvis without contrast ([facility]) 11/2/2022: No contrast evidence of metastasis.  Right gluteus denise centered mass is unchanged.  \"Prior cholecystectomy.  Calcification the right lobe of the liver is indeterminate but may represent remote granulomatous disease.     CT chest abdomen pelvis " with contrast 2/2/2023:  1.  Negative for metastatic disease within the chest, abdomen, or pelvis  2.  2.6 cm right adnexal cyst is likely physiologic in this age group  3.  Previous cholecystectomy  4.  Right gluteal intramuscular mass is considerably smaller now measuring 5.7 x 2.6 cm and previously 9.3 x 6.0 cm consistent with favorable response to therapy     MRI right femur with and without contrast 2/17/2023:  Interval decrease in size of a large right inferior buttock/upper thigh mass which involves the right gluteus denise muscle and contacts the hamstrings muscles. This currently measures 10 x 5.6 x 3.6 cm in size compared to previous measurement of 16 x   10 x 7.3 cm. This is again most consistent with myxoid variant liposarcoma.     Pathology  IR guided biopsy ([facility]) 11/12/2022:   Right posterior thigh mass on core biopsy consistent with myxoid liposarcoma.  There is no round cell component identified in the limited sample.     Surgical pathology from 4/13/2023:  A. Sarcoma:          Myxoid liposarcoma.     SOFT TISSUE: Resection   CLINICAL     Preresection Treatment:  Radiation therapy performed   SPECIMEN     Procedure:  Wide resection (per OP note)   TUMOR     Tumor Focality:  Unifocal     Tumor Site:  Trunk and extremities - right gluteus denise muscle     Tumor Size:  Greatest Dimension (Centimeters) - 7.2 cm     Histologic Type (WHO):  Myxoid liposarcoma       Percentage of Hypercellular Areas - 0%     Histologic Grade (FNCLCC):  Grade 1     Mitotic Rate:  0 mitoses per 10 high-power fields (HPF)     Necrosis (macroscopic or microscopic):  Not identified     Treatment Effect:  Not identified     Lymphovascular Invasion:  Not identified     Tumor Comment:  Per OP note, the tumor did shrink to half its size     after therapy but no histologic evidence is noted on slides.   MARGINS     Margin Status:  Tumor present at margin       Margin(s) Involved by Tumor:  Posterior margin is positive      Margin Comment:  Lateral margin is uninvolved by < 1/10 mm in     distance and medial margin is uninvolved by < 1/2 mm and anterior     margin is 3 mm in distance.  All other margins are at least 5 mm in     distance.   REGIONAL LYMPH NODES     Regional Lymph Node Status:  Not applicable (no regional lymph nodes     submitted or found)   PATHOLOGIC STAGE CLASSIFICATION (pTNM, AJCC 8th Edition)   Reporting of pT, pN, and (when applicable) pM categories is based on   information available to the pathologist at the time the report is   issued. As per the AJCC (Chapter 1, 8th Ed.) it is the managing   physician's responsibility to establish the final pathologic stage   based upon all pertinent information, including but potentially not   limited to this pathology report.     Pathologic Stage Classification:  Histologic type appropriate for     staging       TNM Descriptors:  y (post-treatment)       pT Category:  pT2       pN Category:  pN not assigned (no nodes submitted or found)   SPECIAL STUDIES     Immunohistochemistry:  Not performed     Cytogenetics:  Not performed     Molecular Pathology:  Not performed   Comment(s):  There is adequate tumor present in multiple blocks (A4   specifically) for additional studies if clinically indicated. Per OP   note, 3 to 5 cm margins were thought to have been obtained.      Procedures  Operation 4/13/2023:  1.  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  Intraoperative ultrasound  3.  Complex layered closure    Diagnosis:     1. Myxoid liposarcoma (HCC)        2. R gluteal pain and mass/swellinh        3. Abnormal finding of diagnostic imaging            Medical Decision Making:  Today's Assessment / Status / Plan:     43F with myxoid LPS of right gluteus s/p radiation and resection, completed treatment 4/13/2023.  1 year post op visit.    Surveillance imaging with no evidence of recurrence.  Patient RADHA.  RTC in 3 months with MRI of right hip.   Questions all answered, understands to contact our office if questions.    I, Artie Rojas MD have entered, reviewed and confirmed the above diagnosis related to this patient on this date of service, April 17, 2024     Artie Rojas M.D.

## 2024-04-17 ENCOUNTER — OFFICE VISIT (OUTPATIENT)
Dept: SURGICAL ONCOLOGY | Facility: MEDICAL CENTER | Age: 44
End: 2024-04-17
Payer: COMMERCIAL

## 2024-04-17 VITALS
DIASTOLIC BLOOD PRESSURE: 64 MMHG | SYSTOLIC BLOOD PRESSURE: 104 MMHG | BODY MASS INDEX: 26.43 KG/M2 | HEART RATE: 75 BPM | HEIGHT: 61 IN | WEIGHT: 140 LBS | TEMPERATURE: 97.3 F | OXYGEN SATURATION: 98 %

## 2024-04-17 DIAGNOSIS — C49.9 MYXOID LIPOSARCOMA (HCC): ICD-10-CM

## 2024-04-17 DIAGNOSIS — M79.18 GLUTEAL PAIN: ICD-10-CM

## 2024-04-17 DIAGNOSIS — R93.89 ABNORMAL FINDING OF DIAGNOSTIC IMAGING: ICD-10-CM

## 2024-04-17 PROCEDURE — 99214 OFFICE O/P EST MOD 30 MIN: CPT | Performed by: SURGERY

## 2024-04-17 PROCEDURE — 3078F DIAST BP <80 MM HG: CPT | Performed by: SURGERY

## 2024-04-17 PROCEDURE — 3074F SYST BP LT 130 MM HG: CPT | Performed by: SURGERY

## 2024-04-17 ASSESSMENT — ENCOUNTER SYMPTOMS
HALLUCINATIONS: 0
NAUSEA: 0
BACK PAIN: 0
SENSORY CHANGE: 0
NECK PAIN: 0
HEMOPTYSIS: 0
CHILLS: 0
ABDOMINAL PAIN: 0
FOCAL WEAKNESS: 0
EYE PAIN: 0
DIARRHEA: 0
WEIGHT LOSS: 0
HEARTBURN: 0
VOMITING: 0
CLAUDICATION: 0
PALPITATIONS: 0
HEADACHES: 0
SPEECH CHANGE: 0
DOUBLE VISION: 0
FEVER: 0
MYALGIAS: 0
NERVOUS/ANXIOUS: 0
EYE DISCHARGE: 0
BRUISES/BLEEDS EASILY: 0
TINGLING: 0
DEPRESSION: 0
COUGH: 0
CONSTIPATION: 0
PHOTOPHOBIA: 0
TREMORS: 0
ORTHOPNEA: 0
SPUTUM PRODUCTION: 0
DIZZINESS: 0
BLURRED VISION: 0

## 2024-04-17 ASSESSMENT — FIBROSIS 4 INDEX: FIB4 SCORE: 1.17

## 2024-04-17 ASSESSMENT — LIFESTYLE VARIABLES: SUBSTANCE_ABUSE: 0

## 2024-10-04 ENCOUNTER — HOSPITAL ENCOUNTER (OUTPATIENT)
Dept: RADIOLOGY | Facility: MEDICAL CENTER | Age: 44
End: 2024-10-04
Attending: SURGERY
Payer: COMMERCIAL

## 2024-10-04 DIAGNOSIS — C49.9 MYXOID LIPOSARCOMA (HCC): ICD-10-CM

## 2024-10-04 PROCEDURE — A9579 GAD-BASE MR CONTRAST NOS,1ML: HCPCS | Mod: JZ | Performed by: SURGERY

## 2024-10-04 PROCEDURE — 700117 HCHG RX CONTRAST REV CODE 255: Mod: JZ | Performed by: SURGERY

## 2024-10-04 PROCEDURE — 73720 MRI LWR EXTREMITY W/O&W/DYE: CPT | Mod: RT

## 2024-10-04 RX ADMIN — GADOTERIDOL 13 ML: 279.3 INJECTION, SOLUTION INTRAVENOUS at 16:54

## 2024-10-22 ENCOUNTER — OFFICE VISIT (OUTPATIENT)
Dept: SURGICAL ONCOLOGY | Facility: MEDICAL CENTER | Age: 44
End: 2024-10-22
Payer: COMMERCIAL

## 2024-10-22 VITALS
TEMPERATURE: 97.4 F | SYSTOLIC BLOOD PRESSURE: 100 MMHG | HEIGHT: 61 IN | WEIGHT: 140 LBS | OXYGEN SATURATION: 98 % | DIASTOLIC BLOOD PRESSURE: 80 MMHG | BODY MASS INDEX: 26.43 KG/M2 | HEART RATE: 71 BPM

## 2024-10-22 DIAGNOSIS — M79.18 GLUTEAL PAIN: ICD-10-CM

## 2024-10-22 DIAGNOSIS — C49.9 MYXOID LIPOSARCOMA (HCC): ICD-10-CM

## 2024-10-22 DIAGNOSIS — R93.89 ABNORMAL FINDING OF DIAGNOSTIC IMAGING: ICD-10-CM

## 2024-10-22 PROCEDURE — 3074F SYST BP LT 130 MM HG: CPT | Performed by: SURGERY

## 2024-10-22 PROCEDURE — 99213 OFFICE O/P EST LOW 20 MIN: CPT | Performed by: SURGERY

## 2024-10-22 PROCEDURE — 3079F DIAST BP 80-89 MM HG: CPT | Performed by: SURGERY

## 2024-10-22 ASSESSMENT — ENCOUNTER SYMPTOMS
FOCAL WEAKNESS: 0
VOMITING: 0
WEIGHT LOSS: 0
DOUBLE VISION: 0
BLURRED VISION: 0
MYALGIAS: 0
NAUSEA: 0
CLAUDICATION: 0
CONSTIPATION: 0
TREMORS: 0
NECK PAIN: 0
HEADACHES: 0
DIARRHEA: 0
DIZZINESS: 0
TINGLING: 0
HEMOPTYSIS: 0
BRUISES/BLEEDS EASILY: 0
HEARTBURN: 0
DEPRESSION: 0
BACK PAIN: 0
ORTHOPNEA: 0
NERVOUS/ANXIOUS: 0
ABDOMINAL PAIN: 0
HALLUCINATIONS: 0
EYE DISCHARGE: 0
PHOTOPHOBIA: 0
COUGH: 0
SENSORY CHANGE: 0
FEVER: 0
EYE PAIN: 0
SPEECH CHANGE: 0
PALPITATIONS: 0
CHILLS: 0
SPUTUM PRODUCTION: 0

## 2024-10-22 ASSESSMENT — LIFESTYLE VARIABLES: SUBSTANCE_ABUSE: 0

## 2024-10-22 ASSESSMENT — FIBROSIS 4 INDEX: FIB4 SCORE: 1.2

## 2024-12-31 ENCOUNTER — HOSPITAL ENCOUNTER (OUTPATIENT)
Dept: RADIOLOGY | Facility: MEDICAL CENTER | Age: 44
End: 2024-12-31
Attending: SURGERY
Payer: COMMERCIAL

## 2024-12-31 DIAGNOSIS — M79.18 GLUTEAL PAIN: ICD-10-CM

## 2024-12-31 DIAGNOSIS — C49.9 MYXOID LIPOSARCOMA (HCC): ICD-10-CM

## 2024-12-31 DIAGNOSIS — R93.89 ABNORMAL FINDING OF DIAGNOSTIC IMAGING: ICD-10-CM

## 2024-12-31 PROCEDURE — 700117 HCHG RX CONTRAST REV CODE 255: Mod: JZ | Performed by: SURGERY

## 2024-12-31 PROCEDURE — 73720 MRI LWR EXTREMITY W/O&W/DYE: CPT | Mod: RT

## 2024-12-31 PROCEDURE — A9579 GAD-BASE MR CONTRAST NOS,1ML: HCPCS | Mod: JZ | Performed by: SURGERY

## 2024-12-31 RX ADMIN — GADOTERIDOL 14 ML: 279.3 INJECTION, SOLUTION INTRAVENOUS at 08:40

## 2025-02-26 ENCOUNTER — APPOINTMENT (OUTPATIENT)
Dept: RADIOLOGY | Facility: MEDICAL CENTER | Age: 45
End: 2025-02-26
Attending: EMERGENCY MEDICINE
Payer: OTHER MISCELLANEOUS

## 2025-02-26 ENCOUNTER — HOSPITAL ENCOUNTER (EMERGENCY)
Facility: MEDICAL CENTER | Age: 45
End: 2025-02-26
Attending: EMERGENCY MEDICINE
Payer: OTHER MISCELLANEOUS

## 2025-02-26 VITALS
TEMPERATURE: 98 F | DIASTOLIC BLOOD PRESSURE: 70 MMHG | WEIGHT: 150 LBS | BODY MASS INDEX: 27.6 KG/M2 | HEIGHT: 62 IN | HEART RATE: 74 BPM | OXYGEN SATURATION: 95 % | RESPIRATION RATE: 16 BRPM | SYSTOLIC BLOOD PRESSURE: 122 MMHG
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
## 2025-02-26 DIAGNOSIS — R22.1 NECK MASS: ICD-10-CM

## 2025-02-26 DIAGNOSIS — S01.81XA FACIAL LACERATION, INITIAL ENCOUNTER: ICD-10-CM

## 2025-02-26 PROCEDURE — A9270 NON-COVERED ITEM OR SERVICE: HCPCS | Performed by: EMERGENCY MEDICINE

## 2025-02-26 PROCEDURE — 700101 HCHG RX REV CODE 250: Performed by: EMERGENCY MEDICINE

## 2025-02-26 PROCEDURE — 304999 HCHG REPAIR-SIMPLE/INTERMED LEVEL 1

## 2025-02-26 PROCEDURE — 700111 HCHG RX REV CODE 636 W/ 250 OVERRIDE (IP): Mod: JZ | Performed by: EMERGENCY MEDICINE

## 2025-02-26 PROCEDURE — 96374 THER/PROPH/DIAG INJ IV PUSH: CPT

## 2025-02-26 PROCEDURE — 72125 CT NECK SPINE W/O DYE: CPT

## 2025-02-26 PROCEDURE — 303747 HCHG EXTRA SUTURE

## 2025-02-26 PROCEDURE — 304217 HCHG IRRIGATION SYSTEM

## 2025-02-26 PROCEDURE — 90715 TDAP VACCINE 7 YRS/> IM: CPT | Performed by: EMERGENCY MEDICINE

## 2025-02-26 PROCEDURE — 90471 IMMUNIZATION ADMIN: CPT

## 2025-02-26 PROCEDURE — 99285 EMERGENCY DEPT VISIT HI MDM: CPT

## 2025-02-26 PROCEDURE — 700102 HCHG RX REV CODE 250 W/ 637 OVERRIDE(OP): Performed by: EMERGENCY MEDICINE

## 2025-02-26 PROCEDURE — 70450 CT HEAD/BRAIN W/O DYE: CPT

## 2025-02-26 RX ORDER — LIDOCAINE HYDROCHLORIDE AND EPINEPHRINE 10; 10 MG/ML; UG/ML
10 INJECTION, SOLUTION INFILTRATION; PERINEURAL ONCE
Status: COMPLETED | OUTPATIENT
Start: 2025-02-26 | End: 2025-02-26

## 2025-02-26 RX ORDER — IBUPROFEN 600 MG/1
600 TABLET, FILM COATED ORAL ONCE
Status: COMPLETED | OUTPATIENT
Start: 2025-02-26 | End: 2025-02-26

## 2025-02-26 RX ORDER — ACETAMINOPHEN 10 MG/ML
1000 INJECTION, SOLUTION INTRAVENOUS ONCE
Status: COMPLETED | OUTPATIENT
Start: 2025-02-26 | End: 2025-02-26

## 2025-02-26 RX ADMIN — IBUPROFEN 600 MG: 600 TABLET, FILM COATED ORAL at 19:44

## 2025-02-26 RX ADMIN — ACETAMINOPHEN 1000 MG: 10 INJECTION, SOLUTION INTRAVENOUS at 16:05

## 2025-02-26 RX ADMIN — CLOSTRIDIUM TETANI TOXOID ANTIGEN (FORMALDEHYDE INACTIVATED), CORYNEBACTERIUM DIPHTHERIAE TOXOID ANTIGEN (FORMALDEHYDE INACTIVATED), BORDETELLA PERTUSSIS TOXOID ANTIGEN (GLUTARALDEHYDE INACTIVATED), BORDETELLA PERTUSSIS FILAMENTOUS HEMAGGLUTININ ANTIGEN (FORMALDEHYDE INACTIVATED), BORDETELLA PERTUSSIS PERTACTIN ANTIGEN, AND BORDETELLA PERTUSSIS FIMBRIAE 2/3 ANTIGEN 0.5 ML: 5; 2; 2.5; 5; 3; 5 INJECTION, SUSPENSION INTRAMUSCULAR at 16:14

## 2025-02-26 RX ADMIN — LIDOCAINE HYDROCHLORIDE AND EPINEPHRINE 10 ML: 10; 10 INJECTION, SOLUTION INFILTRATION; PERINEURAL at 17:30

## 2025-02-26 ASSESSMENT — FIBROSIS 4 INDEX: FIB4 SCORE: 1.2
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
## 2025-02-27 NOTE — ED NOTES
Report to Milly BURTON, PT in bed locked in lowest positions side rail up x 2, family bedside. Pt GCS 15 a/o x 4. Pt on HR, oxygen, and BP monitor.

## 2025-02-27 NOTE — DISCHARGE INSTRUCTIONS
Your tests today showed no dangerous injury bleeding in your brain or broken bones.  Please keep your cut clean and dry it is okay to shower beginning tomorrow but do not submerge the wound.  Monitor for signs of infection and have sutures removed in 5 days.  You can take Tylenol and/or ibuprofen as needed for pain.  Additionally was you were noted to have a fibrous collection of tissue in your bone near the neck.  This does not necessarily need follow-up but if you want to see a neurosurgery specialist you can follow-up as above.

## 2025-02-27 NOTE — ED NOTES
Pt notified ED staff she needed to urinate, This RN informed pt we could do a bed pan or pure-wick to use the restroom due to her having a c-collar in place, Pt refused to use bedpan or pure-wick and stated she understood this Rns concerns about her ambulating before the CT scan results of her neck were resulted but pt stated she wanted to ambulate to restroom. Pt ambulated to restroom without assistance with steady gait. Pt GCS 15 a/o x 4.

## 2025-02-27 NOTE — ED NOTES
"Pt medicated per MAR. Pt discharged home with family. IV discontinued and gauze placed, pt in possession of belongings. Pt provided discharge education and information pertaining to medications and follow up appointments. Pt received copy of discharge instructions and verbalized understanding. Discussed signs and symptoms to return to the ER, patient verbalized understanding. Pt is A/O x 4, WC to the lobby with family.      /70   Pulse 74   Temp 36.7 °C (98 °F) (Temporal)   Resp 16   Ht 1.575 m (5' 2\")   Wt 68 kg (150 lb)   SpO2 95%   BMI 27.44 kg/m²    "

## 2025-02-27 NOTE — ED NOTES
Bedside report received from off going RN/tech: Fatemeh RN, assumed care of patient.  POC discussed with patient. Call light within reach, all needs addressed at this time. ERP at bedside.       Fall risk interventions in place: Not Applicable (all applicable per Jean Fall risk assessment)   Continuous monitoring: Pulse Ox or Blood Pressure  IVF/IV medications: Not Applicable   Oxygen: Room Air  Bedside sitter: Not Applicable   Isolation: Not Applicable

## 2025-03-04 ENCOUNTER — HOSPITAL ENCOUNTER (EMERGENCY)
Facility: MEDICAL CENTER | Age: 45
End: 2025-03-04
Attending: EMERGENCY MEDICINE
Payer: OTHER MISCELLANEOUS

## 2025-03-04 VITALS
DIASTOLIC BLOOD PRESSURE: 74 MMHG | HEIGHT: 62 IN | BODY MASS INDEX: 27.6 KG/M2 | HEART RATE: 88 BPM | WEIGHT: 150 LBS | SYSTOLIC BLOOD PRESSURE: 118 MMHG | OXYGEN SATURATION: 98 % | TEMPERATURE: 98.4 F | RESPIRATION RATE: 17 BRPM

## 2025-03-04 DIAGNOSIS — Z51.89 ENCOUNTER FOR WOUND RE-CHECK: ICD-10-CM

## 2025-03-04 PROCEDURE — 99282 EMERGENCY DEPT VISIT SF MDM: CPT

## 2025-03-04 ASSESSMENT — FIBROSIS 4 INDEX: FIB4 SCORE: 1.2

## 2025-03-04 NOTE — ED NOTES
Patient given discharge instructions and verbalizes understanding. Pt awaiting registration, PAR aware.
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
## 2025-03-04 NOTE — ED NOTES
This RN able to remove 3 sutures, 3 additional sutures removed by MICHEAL Yang. Total of 6 sutures noted to be placed on previous visit, 6 removed today. Pt resting with even chest rise and fall, reports no needs at this time.

## 2025-03-04 NOTE — ED TRIAGE NOTES
Chief Complaint   Patient presents with    Suture Removal     Sutures placed on 2/26 and needs them removed.   Unable to remove them in triage.   Explained to pt triage process, made pt aware to tell this RN/staff of any changes/concerns, pt verbalized understanding of process and instructions given. Pt to ER lobby.

## 2025-03-04 NOTE — ED NOTES
Registration cleared pt. Patient left with all belongings and ambulates to ED lobby with steady gait.

## 2025-04-23 ENCOUNTER — HOSPITAL ENCOUNTER (OUTPATIENT)
Dept: RADIOLOGY | Facility: MEDICAL CENTER | Age: 45
End: 2025-04-23
Attending: SURGERY
Payer: COMMERCIAL

## 2025-04-23 DIAGNOSIS — C49.9 MYXOID LIPOSARCOMA (HCC): ICD-10-CM

## 2025-04-23 DIAGNOSIS — M79.18 GLUTEAL PAIN: ICD-10-CM

## 2025-04-23 DIAGNOSIS — R93.89 ABNORMAL FINDING OF DIAGNOSTIC IMAGING: ICD-10-CM

## 2025-04-23 PROCEDURE — 71260 CT THORAX DX C+: CPT

## 2025-04-23 PROCEDURE — 700117 HCHG RX CONTRAST REV CODE 255: Performed by: SURGERY

## 2025-04-23 RX ADMIN — IOHEXOL 100 ML: 350 INJECTION, SOLUTION INTRAVENOUS at 13:55

## 2025-05-08 ENCOUNTER — TELEPHONE (OUTPATIENT)
Facility: MEDICAL CENTER | Age: 45
End: 2025-05-08
Payer: COMMERCIAL

## 2025-05-08 DIAGNOSIS — R93.89 ABNORMAL FINDING OF DIAGNOSTIC IMAGING: ICD-10-CM

## 2025-05-08 DIAGNOSIS — M79.18 GLUTEAL PAIN: ICD-10-CM

## 2025-05-08 DIAGNOSIS — C49.9 MYXOID LIPOSARCOMA (HCC): ICD-10-CM

## 2025-05-08 NOTE — TELEPHONE ENCOUNTER
Phone Number Called: [phone]    Call outcome: Spoke to patient regarding message below.    Message: Patient informed of new STAT MRI order. Transferred patient to the imaging department for scheduling.   Future Appointments   Date Time Provider Department Center   5/13/2025  8:30 AM Artie Rojas M.D. [facility] None   5/14/2025  3:30 PM [address]

## 2025-05-08 NOTE — PROGRESS NOTES
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 Left knee pain, unspecified chronicity        2. Myxoid liposarcoma (HCC)        3. R gluteal pain and mass/swellinh        4. Abnormal finding of diagnostic imaging            History of presenting illness:    Madison Allen is a pleasant 43 y.o. female otherwise healthy who presents for surgical evaluation for newly identified right posterior thigh myxoid liposarcoma.  The patient noted a small mass in her right gluteal region in June 2020.  Over the last few months it has started to increase significantly in size.  She not been symptomatic from it previously but it has started to cause her some discomfort with sitting down, driving, going to the bathroom.  She denies any pain, weakness, decreased sensation in the right lower extremity.  She was initially evaluated in our medical oncology clinic and a biopsy was performed.  The biopsy was consistent with a myxoid liposarcoma with no round cell component identified in the limited sample.     Patient presents today for surgical evaluation of newly identified myxoid liposarcoma of the right gluteus muscle extending down to the right posterior thigh.  She states that she does feel as if the mass is grown since her biopsy was performed.  She does has discomfort directly over the area when she sits down, drives, goes to the bathroom.  She denies any neuromuscular symptoms of the right lower extremity and appears to have full function of the extremity.  She denies any fevers or chills.  No recent weight loss.  Denies any masses in any other  places.     Update 2/1/2023:   Patient presents today for follow-up post neoadjuvant radiation therapy.  She did well with therapy did not experience any significant complications and notes that she feels that the mass has shrunk significantly since I originally saw her.  She completed radiation on 1/19/2023 with a total of 50 Gray in 25 fractions.  She has done remarkably well.  She denies any pain over the area of the mass.  She is scheduled for a CT of the chest abdomen and pelvis tomorrow.     Update 3/28/2023:  Patient presents today for preoperative discussion.  She was seen by plastic surgery and I had a discussion with Dr. Santizo in regards to the possible need for reconstruction for closure of the wound. Given the shrinkage of the tumor does feel that we might be able to get this wound closed.  She in general is doing well since I last saw her.  She does not feel any significant growth of the mass of the lesion and is otherwise recovered well from her radiation.     Update 4/25/2023:  Operation 4/13/2023:  1.  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  Intraoperative ultrasound  3.  Complex layered closure     Patient presents today for her first postoperative visit.  She has generally been doing well at home. Her drain is putting out approximately 40 to 50 cc a day of clear serous fluid.  She continues to be weak with ambulation but this is slowly improving and she is needing to use her cane less.  She is having a little bit of pain but it is well controlled with minimal need for pain medication.  She is otherwise tolerating a diet and denies any drainage or purulent material from her dressing.     Update 5/10/23  Tumor board discussion in regards to her final pathology and margins.  The ultimate decision of the tumor board was to initiate ongoing surveillance with no need for additional adjuvant therapy at this time.     Drain is putting out approximately 15 cc/day     She is  here today for a postoperative follow-up visit.  She has been doing well.  She states her strength is continuing to improve and she is walking better and better each day.  Her drain output has been minimal and serous appearing.  She had no issues with her wound. The plan is for genetic testing and no plan for adjuvant radiation at this time. She has followed up with [facility] [employer], and genetic was not recommended at the time. We will plan for to start surveillance with every 3 months MRI of the right lower extremity and 6 month CTs of the chest, abdomen and pelvis.     Update 6/6/23  She is here today for follow-up.  She is in general been doing very well since her surgery.  She is no longer walking with a cane.  She still does have some issues with bending over she feels weak when she does that but otherwise is nearly back to her normal state of health with the exception of some ongoing weakness and difficulty with bending.  She does still limp at baseline but this seems to be slowly improving as her strength improves. She denies any issues with the wound.  She does have some numbness on the posterior aspect of her right thigh.     Update 7/5/23  She is here today for postoperative visit.  She has been doing incredibly well since surgery.  States she has essentially no deficit at this point.  She is walking normal.  No use of any assistance device.  She feels like she is back to her normal state of health and functionally has no deficits on the right side.  She does have some pain over the medial aspect of the surgical site which has been stable.  She intermittently will take Tylenol or ibuprofen but does not take anything around-the-clock.  She otherwise is doing well.  She would like to go back to work 2 weeks.     Update 1/2/23  MRI FEMUR on 11/22/23 noted surgical change consistent with prior resection of right upper posterior thigh/inferior buttock mass. There is again seen enhancing scar tissue within the  subcutaneous fat with extension into the right gluteus denise muscle inferiorly, quadratus femoris muscle and the upper hamstring seen right vastus lateralis and vastus intermedius muscles posteriorly.     She presents today for surveillance visit.  In general she has been doing very well.  She still has some numbness along the back side of her upper leg along the hamstring muscle on the right.  She has no limitations with respect to function of the leg.  She has been back at work and doing well.  She otherwise has normal function and is back to all of her activities of daily living without issue.     Update 4/17/24  MR FEMUR on 4/2/24 noted postoperative change with mild improvement in signal abnormality. No definite evidence for tumor recurrence.     CT C/A/P on 4/2/24 noted no evidence of disease recurrence. Stable 4 cm soft tissue fullness of the resection site, therefore favoring scarring. Slightly decreased thymic tissue. Therefore, considered benign. Otherwise, negative.     She is here today for generally doing well.  No issues.  Still feels right a little weaker than left but does not affect her function.  No pain.  Discussed imaging results from most recent surveillance imaging.     Update 10/22/24  MR FEMUR on 10/4/24 noted interval improvement in signal abnormality noted in the region of prior surgery and no evidence for recurrent or residual neoplasm.     She is here today for follow-up.  She has been doing very well.  She has regained her strength.  She still has numbness along the lateral thigh that is stable.  She otherwise feels well.  No major changes since I saw her last.    Update 5/28/25  MR R FEMUR on 12/31/24 noted stable surgical scarring in the area of tumor resection of the right upper posterior thigh/inferior buttock. Evidence of recurrent or residual tumor.    CT C/A/P on 4/23/25 noted no evidence of metastatic disease in the chest, abdomen or pelvis and presumed postoperative scarring  right gluteal region without evidence of local recurrence.    MR R FEMUR on 5/14/25 noted stable post surgical change involving the right gluteal and hamstring muscles with stable enhancing scar tissue present. No evidence of recurrent or residual neoplasm.     She is here today for surveillance follow-up.  She has been doing very well.  She is gaining the strength back in her right leg and feels like she is generally pretty well-balanced at this point.  She has noted some pain and swelling of her left knee joint over the last couple weeks.  It is not prohibited her from walking or doing her activities of daily living.  She does not recall any significant trauma to the area.  She is otherwise in her normal state of health.  We did review her imaging today in clinic.    Past Medical History:   Diagnosis Date    Pregnant state, incidental      Past Surgical History:   Procedure Laterality Date    IN ULTRASONIC GUIDANCE, INTRAOPERATIVE  4/13/2023    Procedure: ULTRASOUND GUIDANCE;  Surgeon: Artie Rojas M.D.;  Location: SURGERY [facility];  Service: General    MASS EXCISION GENERAL  4/13/2023    Procedure: RADICAL RESECTION OF RIGHT GLUTEAL MASS;  Surgeon: Artie Rojas M.D.;  Location: SURGERY [facility];  Service: General    CHOLECYSTECTOMY  Dx 2006    no complications     Allergies   Allergen Reactions    Nkda [No Known Drug Allergy]      Outpatient Encounter Medications as of 5/28/2025   Medication Sig Dispense Refill    docusate sodium 100 MG Cap Take 100 mg by mouth 2 times a day. 60 Capsule 1    etonogestrel (NEXPLANON) 68 MG Implant implant Inject 1 Each under the skin one time.       No facility-administered encounter medications on file as of 5/28/2025.     Social History     Socioeconomic History    Marital status: Single     Spouse name: Not on file    Number of children: Not on file    Years of education: Not on file    Highest education level: Not on file   Occupational History    Not on  file   Tobacco Use    Smoking status: Never    Smokeless tobacco: Never    Tobacco comments:     last used 9 years ago   Vaping Use    Vaping status: Never Used   Substance and Sexual Activity    Alcohol use: No     Comment: socially    Drug use: No    Sexual activity: Yes     Partners: Male     Birth control/protection: Condom   Other Topics Concern    Not on file   Social History Narrative    Not on file     Social Drivers of Health     Financial Resource Strain: Not on file   Food Insecurity: Not on file   Transportation Needs: Not on file   Physical Activity: Not on file   Stress: Not on file   Social Connections: Not on file   Intimate Partner Violence: Not on file   Housing Stability: Not on file      Social History     Tobacco Use   Smoking Status Never   Smokeless Tobacco Never   Tobacco Comments    last used 9 years ago     Social History     Substance and Sexual Activity   Alcohol Use No    Comment: socially     Social History     Substance and Sexual Activity   Drug Use No      Family History   Problem Relation Age of Onset    Heart Disease Mother 62        hearth attack    Diabetes Mother         pills    Hypertension Mother     Cancer Father         Lung    Alcohol/Drug Father         alcoholic    Thyroid Sister     Cancer Brother         Lung    Diabetes Brother         diet       Review of Systems   Constitutional:  Negative for chills, fever, malaise/fatigue and weight loss.   HENT:  Negative for congestion, ear discharge, ear pain, hearing loss and nosebleeds.    Eyes:  Negative for blurred vision, double vision, photophobia, pain and discharge.   Respiratory:  Negative for cough, hemoptysis and sputum production.    Cardiovascular:  Negative for chest pain, palpitations, orthopnea and claudication.   Gastrointestinal:  Negative for abdominal pain, constipation, diarrhea, heartburn, nausea and vomiting.   Genitourinary:  Negative for dysuria, frequency, hematuria and urgency.   Musculoskeletal:   "Positive for joint pain. Negative for back pain, myalgias and neck pain.   Skin:  Negative for itching and rash.   Neurological:  Negative for dizziness, tingling, tremors, sensory change, speech change, focal weakness and headaches.   Endo/Heme/Allergies:  Negative for environmental allergies. Does not bruise/bleed easily.   Psychiatric/Behavioral:  Negative for depression, hallucinations, substance abuse and suicidal ideas. The patient is not nervous/anxious.         Objective:   /70 (BP Location: Left arm, Patient Position: Sitting, BP Cuff Size: Adult)   Pulse 78   Temp 36.6 °C (97.8 °F) (Temporal)   Ht 1.575 m (5' 2\")   Wt 70.7 kg (155 lb 13.8 oz)   SpO2 97%   BMI 28.51 kg/m²     Physical Exam  Constitutional:       General: She is not in acute distress.  HENT:      Head: Normocephalic and atraumatic.   Eyes:      General: No scleral icterus.  Cardiovascular:      Rate and Rhythm: Normal rate and regular rhythm.   Pulmonary:      Effort: No respiratory distress.   Abdominal:      Palpations: Abdomen is soft.   Musculoskeletal:      Cervical back: Normal range of motion.      Comments: Well-healed scar of the right gluteal region.  There is no palpable firmness or masses in the area.  Scar is very soft.    Her left knee is slightly larger than the right knee.  It is sore on the lateral aspects of the joint to palpation.  When she extends the knee she does not feel any pain.  When she flexes she does have some discomfort.  She has full strength.  There are no neurovascular abnormalities..   Neurological:      Mental Status: She is alert.         Labs    Latest Reference Range & Units 04/16/23 03:39   WBC 4.8 - 10.8 K/uL 4.2 (L)   RBC 4.20 - 5.40 M/uL 4.43   Hemoglobin 12.0 - 16.0 g/dL 11.4 (L)   Hematocrit 37.0 - 47.0 % 35.5 (L)   MCV 81.4 - 97.8 fL 80.1 (L)   MCH 27.0 - 33.0 pg 25.7 (L)   MCHC 33.6 - 35.0 g/dL 32.1 (L)   RDW 35.9 - 50.0 fL 43.4   Platelet Count 164 - 446 K/uL 157 (L)   MPV 9.0 - 12.9 " fL 10.5   (L): Data is abnormally low       Latest Reference Range & Units 04/16/23 03:39   Sodium 135 - 145 mmol/L 140   Potassium 3.6 - 5.5 mmol/L 3.8   Chloride 96 - 112 mmol/L 107   Co2 20 - 33 mmol/L 23   Anion Gap 7.0 - 16.0  10.0   Glucose 65 - 99 mg/dL 106 (H)   Bun 8 - 22 mg/dL 10   Creatinine 0.50 - 1.40 mg/dL 0.51   GFR (CKD-EPI) >60 mL/min/1.73 m 2 118   Calcium 8.5 - 10.5 mg/dL 9.1   Correct Calcium 8.5 - 10.5 mg/dL 9.3   AST(SGOT) 12 - 45 U/L 34   ALT(SGPT) 2 - 50 U/L 66 (H)   Alkaline Phosphatase 30 - 99 U/L 122 (H)   Total Bilirubin 0.1 - 1.5 mg/dL 0.3   Albumin 3.2 - 4.9 g/dL 3.7   Total Protein 6.0 - 8.2 g/dL 7.0   Globulin 1.9 - 3.5 g/dL 3.3   A-G Ratio g/dL 1.1   (H): Data is abnormally high     Imaging  MR R FEMUR 5/14/25  IMPRESSION:  Stable post surgical change involving the right gluteal and hamstring muscles with stable enhancing scar tissue present. No evidence of recurrent or residual neoplasm.    CT C/A/P 4/23/25  IMPRESSION:  1.  No evidence of metastatic disease in the chest, abdomen or pelvis.  2.  Presumed postoperative scarring right gluteal region without evidence of local recurrence.    MR R FEMUR 12/31/24  IMPRESSION:  Stable surgical scarring in the area of tumor resection of the right upper posterior thigh/inferior buttock. Evidence of recurrent or residual tumor.    MR FEMUR (10/4/24)  IMPRESSION:  1. Interval improvement in signal abnormality noted in the region of prior surgery.  2. There is no evidence for recurrent or residual neoplasm.     MR FEMUR (4/2/24)  IMPRESSION:  1. Postoperative change with mild improvement in signal abnormality.  2. No definite evidence for tumor recurrence.     CT C/A/P (4/2/24)  IMPRESSION:  1. No evidence of disease recurrence. Stable 4 cm soft tissue fullness of the resection site, therefore favoring scarring.  2. Slightly decreased thymic tissue. Therefore, considered benign.  3. Otherwise, negative.     MR FEMUR (11/22/23)  IMPRESSION:   1.   Again seen surgical change consistent with prior resection of right upper posterior thigh/inferior buttock mass. There is again seen enhancing scar tissue within the subcutaneous fat with extension into the right gluteus denise muscle inferiorly,   quadratus femoris muscle and the upper hamstring seen right vastus lateralis and vastus intermedius muscles posteriorly.  2.  Interval resolution of previously seen postsurgical fluid collection at the surgical site.     CT CHEST/ABDOMEN/PELVIS (8/10/23)  IMPRESSION:  1.  No definitive evidence of metastatic disease in the chest, abdomen or pelvis.  2.  Ill-defined soft tissue at the right gluteal mass resection site measures 4.1 cm, possibly postoperative scarring. Attention on follow-up.  3.  Interval enlargement of triangular anterior mediastinal soft tissue, likely representing thymic rebound rather than mediastinal metastasis. Attention on follow-up.     MR FEMUR (8/10/23)  IMPRESSION:  1.  Interval resection of a right upper posterior thigh/inferior buttock mass. There is enhancement along the surgical course which also involves the gluteus denise muscle inferiorly, quadratus femoris muscle, upper hamstrings and right upper posterior vastus lateralis and vastus intermedius muscles. This likely represents postsurgical scar. There is also a rim-enhancing fluid collection at the surgical site measuring 2.8 x 1.8 x 1.5 cm in size likely representing postsurgical hematoma or seroma. This study will serve as a baseline for future interval follow-up.   2.  No evidence of underlying bony abnormality.     CT pelvis with contrast ([facility]) 11/1/2022:  Fairly well-circumscribed ovoid mass in the right gluteus denise muscle measuring 15 x 9 x 6 cm.  Characterized by areas of mostly fluid density but some fatty components are seen as well.  No extension beyond the confines of the muscle is seen.  Some fat is seen peripherally to the margins.  No lymphadenopathy.     MRI femur  "with and without contrast right ([facility]) 11/2/2022:   Large soft tissue mass involving the right buttock inferiorly which extends into the right upper posterior thigh.  Appears to involve the gluteus medius denise muscle as well as contact the hamstrings muscle.  Measures 16 x 10 x 7.3 cm in size and characterized by heterogeneous increased T2 signal with mixed intermediate and increased T1 signal with heterogeneous enhancement.  No evidence of adenopathy.    CT chest abdomen pelvis without contrast ([facility]) 11/2/2022: No contrast evidence of metastasis.  Right gluteus denise centered mass is unchanged.  \"Prior cholecystectomy.  Calcification the right lobe of the liver is indeterminate but may represent remote granulomatous disease.     CT chest abdomen pelvis with contrast 2/2/2023:  1.  Negative for metastatic disease within the chest, abdomen, or pelvis  2.  2.6 cm right adnexal cyst is likely physiologic in this age group  3.  Previous cholecystectomy  4.  Right gluteal intramuscular mass is considerably smaller now measuring 5.7 x 2.6 cm and previously 9.3 x 6.0 cm consistent with favorable response to therapy     MRI right femur with and without contrast 2/17/2023:  Interval decrease in size of a large right inferior buttock/upper thigh mass which involves the right gluteus denise muscle and contacts the hamstrings muscles. This currently measures 10 x 5.6 x 3.6 cm in size compared to previous measurement of 16 x   10 x 7.3 cm. This is again most consistent with myxoid variant liposarcoma.     Pathology  IR guided biopsy ([facility]) 11/12/2022:   Right posterior thigh mass on core biopsy consistent with myxoid liposarcoma.  There is no round cell component identified in the limited sample.     Surgical pathology from 4/13/2023:  A. Sarcoma:          Myxoid liposarcoma.     SOFT TISSUE: Resection   CLINICAL     Preresection Treatment:  Radiation therapy performed   SPECIMEN     Procedure:  Wide resection (per " OP note)   TUMOR     Tumor Focality:  Unifocal     Tumor Site:  Trunk and extremities - right gluteus denise muscle     Tumor Size:  Greatest Dimension (Centimeters) - 7.2 cm     Histologic Type (WHO):  Myxoid liposarcoma       Percentage of Hypercellular Areas - 0%     Histologic Grade (FNCLCC):  Grade 1     Mitotic Rate:  0 mitoses per 10 high-power fields (HPF)     Necrosis (macroscopic or microscopic):  Not identified     Treatment Effect:  Not identified     Lymphovascular Invasion:  Not identified     Tumor Comment:  Per OP note, the tumor did shrink to half its size     after therapy but no histologic evidence is noted on slides.   MARGINS     Margin Status:  Tumor present at margin       Margin(s) Involved by Tumor:  Posterior margin is positive     Margin Comment:  Lateral margin is uninvolved by < 1/10 mm in     distance and medial margin is uninvolved by < 1/2 mm and anterior     margin is 3 mm in distance.  All other margins are at least 5 mm in     distance.   REGIONAL LYMPH NODES     Regional Lymph Node Status:  Not applicable (no regional lymph nodes     submitted or found)   PATHOLOGIC STAGE CLASSIFICATION (pTNM, AJCC 8th Edition)   Reporting of pT, pN, and (when applicable) pM categories is based on   information available to the pathologist at the time the report is   issued. As per the AJCC (Chapter 1, 8th Ed.) it is the managing   physician's responsibility to establish the final pathologic stage   based upon all pertinent information, including but potentially not   limited to this pathology report.     Pathologic Stage Classification:  Histologic type appropriate for     staging       TNM Descriptors:  y (post-treatment)       pT Category:  pT2       pN Category:  pN not assigned (no nodes submitted or found)   SPECIAL STUDIES     Immunohistochemistry:  Not performed     Cytogenetics:  Not performed     Molecular Pathology:  Not performed   Comment(s):  There is adequate tumor present in  multiple blocks (A4   specifically) for additional studies if clinically indicated. Per OP   note, 3 to 5 cm margins were thought to have been obtained.      Procedures  Operation 4/13/2023:  1.  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  Intraoperative ultrasound  3.  Complex layered closure    Diagnosis:     1. Left knee pain, unspecified chronicity        2. Myxoid liposarcoma (HCC)        3. R gluteal pain and mass/swellinh        4. Abnormal finding of diagnostic imaging            Medical Decision Making:  Today's Assessment / Status / Plan:     43F with myxoid LPS of right gluteus s/p radiation and resection, completed treatment 4/13/2023.  ypT2 N0 M0 (stage Ib)    Imaging shows no evidence of recurrence.  She is clinically doing well.  She is having left knee pain which may be from favoring that side given its her stronger side but unclear.  It has been going on for couple weeks.  I have ordered an x-ray of her left knee and should she need it I will refer her to orthopedic surgeon.  With respect to her sarcoma we will plan for repeat imaging in 3 months and I will see her back at that time.    I, Dr. Rojas, have entered, reviewed and confirmed the above diagnoses related to this patient on this date of service, as per the time and date noted at top of this note.

## 2025-05-14 ENCOUNTER — HOSPITAL ENCOUNTER (OUTPATIENT)
Dept: RADIOLOGY | Facility: MEDICAL CENTER | Age: 45
End: 2025-05-14
Payer: COMMERCIAL

## 2025-05-14 DIAGNOSIS — R93.89 ABNORMAL FINDING OF DIAGNOSTIC IMAGING: ICD-10-CM

## 2025-05-14 DIAGNOSIS — C49.9 MYXOID LIPOSARCOMA (HCC): ICD-10-CM

## 2025-05-14 DIAGNOSIS — M79.18 GLUTEAL PAIN: ICD-10-CM

## 2025-05-14 PROCEDURE — 700117 HCHG RX CONTRAST REV CODE 255: Mod: JZ

## 2025-05-14 PROCEDURE — A9579 GAD-BASE MR CONTRAST NOS,1ML: HCPCS | Mod: JZ

## 2025-05-14 PROCEDURE — 73720 MRI LWR EXTREMITY W/O&W/DYE: CPT | Mod: RT

## 2025-05-14 RX ADMIN — GADOTERIDOL 15 ML: 279.3 INJECTION, SOLUTION INTRAVENOUS at 16:29

## 2025-05-28 ENCOUNTER — OFFICE VISIT (OUTPATIENT)
Facility: MEDICAL CENTER | Age: 45
End: 2025-05-28
Payer: COMMERCIAL

## 2025-05-28 VITALS
OXYGEN SATURATION: 97 % | HEIGHT: 62 IN | HEART RATE: 78 BPM | BODY MASS INDEX: 28.68 KG/M2 | DIASTOLIC BLOOD PRESSURE: 70 MMHG | WEIGHT: 155.87 LBS | TEMPERATURE: 97.8 F | SYSTOLIC BLOOD PRESSURE: 110 MMHG

## 2025-05-28 DIAGNOSIS — M25.562 LEFT KNEE PAIN, UNSPECIFIED CHRONICITY: Primary | ICD-10-CM

## 2025-05-28 DIAGNOSIS — M79.18 GLUTEAL PAIN: ICD-10-CM

## 2025-05-28 DIAGNOSIS — R93.89 ABNORMAL FINDING OF DIAGNOSTIC IMAGING: ICD-10-CM

## 2025-05-28 DIAGNOSIS — C49.9 MYXOID LIPOSARCOMA (HCC): ICD-10-CM

## 2025-05-28 ASSESSMENT — ENCOUNTER SYMPTOMS
CLAUDICATION: 0
NAUSEA: 0
SENSORY CHANGE: 0
FOCAL WEAKNESS: 0
TREMORS: 0
NERVOUS/ANXIOUS: 0
SPUTUM PRODUCTION: 0
SPEECH CHANGE: 0
EYE PAIN: 0
FEVER: 0
EYE DISCHARGE: 0
PHOTOPHOBIA: 0
HEMOPTYSIS: 0
VOMITING: 0
MYALGIAS: 0
DIZZINESS: 0
TINGLING: 0
HEADACHES: 0
NECK PAIN: 0
BRUISES/BLEEDS EASILY: 0
DIARRHEA: 0
COUGH: 0
PALPITATIONS: 0
BACK PAIN: 0
HALLUCINATIONS: 0
DOUBLE VISION: 0
BLURRED VISION: 0
WEIGHT LOSS: 0
HEARTBURN: 0
CONSTIPATION: 0
ORTHOPNEA: 0
ABDOMINAL PAIN: 0
DEPRESSION: 0
CHILLS: 0

## 2025-05-28 ASSESSMENT — LIFESTYLE VARIABLES: SUBSTANCE_ABUSE: 0

## 2025-05-30 ENCOUNTER — APPOINTMENT (OUTPATIENT)
Dept: RADIOLOGY | Facility: MEDICAL CENTER | Age: 45
End: 2025-05-30
Attending: SURGERY
Payer: COMMERCIAL

## 2025-06-02 ENCOUNTER — HOSPITAL ENCOUNTER (OUTPATIENT)
Dept: RADIOLOGY | Facility: MEDICAL CENTER | Age: 45
End: 2025-06-02
Attending: SURGERY
Payer: COMMERCIAL

## 2025-06-02 DIAGNOSIS — M25.562 LEFT KNEE PAIN, UNSPECIFIED CHRONICITY: ICD-10-CM

## 2025-06-02 PROCEDURE — 73564 X-RAY EXAM KNEE 4 OR MORE: CPT | Mod: LT

## 2025-06-03 ENCOUNTER — APPOINTMENT (OUTPATIENT)
Dept: RADIOLOGY | Facility: MEDICAL CENTER | Age: 45
End: 2025-06-03
Attending: SURGERY
Payer: COMMERCIAL

## (undated) DEVICE — TUBING CLEARLINK DUO-VENT - C-FLO (48EA/CA)

## (undated) DEVICE — GLOVE BIOGEL SZ 7.5 SURGICAL PF LTX - (50PR/BX 4BX/CA)

## (undated) DEVICE — TUBE CONNECTING SUCTION - CLEAR PLASTIC STERILE 72 IN (50EA/CA)

## (undated) DEVICE — CLIP MED INTNL HRZN TI ESCP - (25/BX)

## (undated) DEVICE — RESERVOIR SUCTION 100 CC - SILICONE (20EA/CA)

## (undated) DEVICE — DISPOSABLE WOUND VAC PICO 10 X 30 CM - WOUND CARE (3/CA)

## (undated) DEVICE — LACTATED RINGERS INJ 1000 ML - (14EA/CA 60CA/PF)

## (undated) DEVICE — SET EXTENSION WITH 2 PORTS (48EA/CA) ***PART #2C8610 IS A SUBSTITUTE*****

## (undated) DEVICE — CANISTER SUCTION 3000ML MECHANICAL FILTER AUTO SHUTOFF MEDI-VAC NONSTERILE LF DISP  (40EA/CA)

## (undated) DEVICE — GLOVE BIOGEL PI INDICATOR SZ 7.0 SURGICAL PF LF - (50/BX 4BX/CA)

## (undated) DEVICE — SUCTION INSTRUMENT YANKAUER BULBOUS TIP W/O VENT (50EA/CA)

## (undated) DEVICE — CHLORAPREP 26 ML APPLICATOR - ORANGE TINT(25/CA)

## (undated) DEVICE — HANDPIECE THUNDERBEAT 5MM 20CM FRONT GRIP TYPE S (5EA/BX)

## (undated) DEVICE — SYRINGE 10 ML CONTROL LL (25EA/BX 4BX/CA)

## (undated) DEVICE — TUBE E-T HI-LO CUFF 7.0MM (10EA/PK)

## (undated) DEVICE — BLADE SURGICAL #15 - (50/BX 3BX/CA)

## (undated) DEVICE — GUIDE TRACHE TUBE INTUBATING STYLET 5.0-10.0MM 14FR (20EA/PK)

## (undated) DEVICE — GLOVE SZ 8 BIOGEL PI MICRO - PF LF (50PR/BX)

## (undated) DEVICE — GELAQUASONIC 100 ULTRASOUND - 48/BX 20GM STERILE FOIL POUCH

## (undated) DEVICE — Device

## (undated) DEVICE — GOWN WARMING STANDARD FLEX - (30/CA)

## (undated) DEVICE — SUTURE GENERAL

## (undated) DEVICE — SLEEVE, VASO, THIGH, MED

## (undated) DEVICE — SUTURE 4-0 MONOCRYL PLUS PS-1 - 27 INCH (36/BX)

## (undated) DEVICE — BAG SPONGE COUNT 10.25 X 32 - BLUE (250/CA)

## (undated) DEVICE — PACK MAJOR BASIN - (2EA/CA)

## (undated) DEVICE — NEEDLE NON SAFETY HYPO 22 GA X 1 1/2 IN (100/BX)

## (undated) DEVICE — SENSOR OXIMETER ADULT SPO2 RD SET (20EA/BX)

## (undated) DEVICE — SUTURE 2-0 VICRYL PLUS CT-1 - 8 X 18 INCH(12/BX)

## (undated) DEVICE — SUTURE 3-0 VICRYL PLUS SH - 8X 18 INCH (12/BX)

## (undated) DEVICE — GOWN SURGEONS LARGE - (32/CA)

## (undated) DEVICE — ELECTRODE DUAL RETURN W/ CORD - (50/PK)

## (undated) DEVICE — STAPLER SKIN DISP - (6/BX 10BX/CA) VISISTAT

## (undated) DEVICE — GLOVE SZ 6 BIOGEL PI MICRO - PF LF (50PR/BX 4BX/CA)

## (undated) DEVICE — GLOVE SZ 7.5 BIOGEL PI MICRO - PF LF (50PR/BX)

## (undated) DEVICE — CANISTER SUCTION RIGID RED 1500CC (40EA/CA)

## (undated) DEVICE — SUTURE 2-0 ETHILON FS - (36/BX) 18 INCH

## (undated) DEVICE — CONTAINER SPECIMEN BAG OR - STERILE 4 OZ W/LID (100EA/CA)

## (undated) DEVICE — CLIP MED LG INTNL HRZN TI ESCP - (20/BX)

## (undated) DEVICE — TOWELS CLOTH SURGICAL - (4/PK 20PK/CA)

## (undated) DEVICE — SET LEADWIRE 5 LEAD BEDSIDE DISPOSABLE ECG (1SET OF 5/EA)